# Patient Record
Sex: FEMALE | Race: WHITE | NOT HISPANIC OR LATINO | Employment: OTHER | URBAN - METROPOLITAN AREA
[De-identification: names, ages, dates, MRNs, and addresses within clinical notes are randomized per-mention and may not be internally consistent; named-entity substitution may affect disease eponyms.]

---

## 2017-07-17 ENCOUNTER — OFFICE VISIT (OUTPATIENT)
Dept: CARDIOLOGY | Facility: MEDICAL CENTER | Age: 72
End: 2017-07-17
Payer: MEDICARE

## 2017-07-17 VITALS
BODY MASS INDEX: 24.33 KG/M2 | SYSTOLIC BLOOD PRESSURE: 140 MMHG | DIASTOLIC BLOOD PRESSURE: 80 MMHG | RESPIRATION RATE: 14 BRPM | OXYGEN SATURATION: 96 % | HEART RATE: 68 BPM | HEIGHT: 67 IN | WEIGHT: 155 LBS

## 2017-07-17 DIAGNOSIS — I48.0 PAF (PAROXYSMAL ATRIAL FIBRILLATION) (HCC): ICD-10-CM

## 2017-07-17 DIAGNOSIS — I10 ESSENTIAL HYPERTENSION: ICD-10-CM

## 2017-07-17 DIAGNOSIS — I10 BENIGN ESSENTIAL HTN: Chronic | ICD-10-CM

## 2017-07-17 DIAGNOSIS — E78.2 MIXED HYPERLIPIDEMIA: Chronic | ICD-10-CM

## 2017-07-17 DIAGNOSIS — Z86.79 S/P ABLATION OF ATRIAL FIBRILLATION: ICD-10-CM

## 2017-07-17 DIAGNOSIS — Z98.890 S/P ABLATION OF ATRIAL FIBRILLATION: ICD-10-CM

## 2017-07-17 LAB — EKG IMPRESSION: NORMAL

## 2017-07-17 PROCEDURE — 93000 ELECTROCARDIOGRAM COMPLETE: CPT | Performed by: INTERNAL MEDICINE

## 2017-07-17 PROCEDURE — 99214 OFFICE O/P EST MOD 30 MIN: CPT | Performed by: INTERNAL MEDICINE

## 2017-07-17 RX ORDER — ASPIRIN 325 MG
325 TABLET ORAL DAILY
COMMUNITY
End: 2019-01-23

## 2017-07-17 RX ORDER — PRAVASTATIN SODIUM 20 MG
20 TABLET ORAL DAILY
Qty: 90 TAB | Refills: 11 | Status: SHIPPED | OUTPATIENT
Start: 2017-07-17 | End: 2018-01-04

## 2017-07-17 RX ORDER — LORATADINE 10 MG/1
10 TABLET ORAL PRN
COMMUNITY

## 2017-07-17 RX ORDER — FLUTICASONE PROPIONATE 50 MCG
1 SPRAY, SUSPENSION (ML) NASAL PRN
COMMUNITY
End: 2019-01-23

## 2017-07-17 RX ORDER — LISINOPRIL 20 MG/1
20 TABLET ORAL DAILY
Qty: 90 TAB | Refills: 3 | Status: SHIPPED | OUTPATIENT
Start: 2017-07-17 | End: 2018-09-15 | Stop reason: SDUPTHER

## 2017-07-17 NOTE — PROGRESS NOTES
Subjective:   Chacha Fraire is a 71 y.o. female who presents today with PAF with two RFA's in 2004. No palps. No symptomatic a fib. Has HLD. Unable to tolerate crestor at 5 mg. No new complaints except mild weight gain.    Past Medical History   Diagnosis Date   • Personal history of prior ablation treatment 5/29/2012   • Benign essential HTN 5/29/2012   • PAF (paroxysmal atrial fibrillation) (CMS-HCC) 5/29/2012   • Hyperlipidemia 5/29/2012     History reviewed. No pertinent past surgical history.  History reviewed. No pertinent family history.  History   Smoking status   • Never Smoker    Smokeless tobacco   • Never Used     Allergies   Allergen Reactions   • Crestor [Rosuvastatin Calcium]      Outpatient Encounter Prescriptions as of 7/17/2017   Medication Sig Dispense Refill   • aspirin (ASA) 325 MG Tab Take 325 mg by mouth every day.     • B Complex-Folic Acid (B COMPLEX PLUS PO) Take 1 Tab by mouth every day.     • loratadine (CLARITIN) 10 MG Tab Take 10 mg by mouth as needed.     • fluticasone (FLONASE) 50 MCG/ACT nasal spray Spray 1 Spray in nose as needed.     • lisinopril (PRINIVIL) 20 MG Tab Take 1 Tab by mouth every day. 90 Tab 3   • pravastatin (PRAVACHOL) 20 MG Tab Take 1 Tab by mouth every day. 90 Tab 11   • Ascorbic Acid (VITAMIN C) 1000 MG TABS Take 1,000 mg by mouth every day.     • vitamin D (CHOLECALCIFEROL) 1000 UNIT TABS Take 1,000 Units by mouth as needed.     • famotidine (PEPCID) 20 MG TABS Take 20 mg by mouth 1 time daily as needed.     • [DISCONTINUED] lisinopril (PRINIVIL) 20 MG Tab TAKE 1 TABLET BY MOUTH ONCE DAILY 90 Tab 2   • rosuvastatin (CRESTOR) 10 MG Tab Take 1 Tab by mouth every evening. 30 Tab 11   • cetirizine (ZYRTEC) 10 MG TABS Take 10 mg by mouth every day.     • Multiple Vitamins-Minerals (MULTIVITAMIN PO) Take 1 Tab by mouth every day.     • aspirin EC (ECOTRIN) 81 MG TBEC Take 81 mg by mouth every day.     • hydrochlorothiazide (HYDRODIURIL) 12.5 MG tablet Take 12.5 mg by  "mouth. 1 tablet daily prn for high BP       No facility-administered encounter medications on file as of 7/17/2017.     ROS     Objective:   /80 mmHg  Pulse 68  Resp 14  Ht 1.702 m (5' 7.01\")  Wt 70.308 kg (155 lb)  BMI 24.27 kg/m2  SpO2 96%    Physical Exam   Constitutional: She is oriented to person, place, and time. She appears well-developed and well-nourished. No distress.   HENT:   Mouth/Throat: Oropharynx is clear and moist.   Eyes: Conjunctivae and EOM are normal.   Neck: Neck supple. No JVD present. No thyroid mass present.   Cardiovascular: Normal rate, regular rhythm, S1 normal, S2 normal and normal pulses.  PMI is not displaced.  Exam reveals no gallop.    No murmur heard.  Pulses:       Carotid pulses are 2+ on the right side, and 2+ on the left side.       Radial pulses are 2+ on the right side, and 2+ on the left side.        Femoral pulses are 2+ on the right side, and 2+ on the left side.       Dorsalis pedis pulses are 2+ on the right side, and 2+ on the left side.   No peripheral edema.   Pulmonary/Chest: Effort normal and breath sounds normal.   Abdominal: Soft. Normal appearance. She exhibits no abdominal bruit and no mass. There is no hepatosplenomegaly. There is no tenderness.   Musculoskeletal: Normal range of motion. She exhibits no edema.        Lumbar back: She exhibits no tenderness and no spasm.   Neurological: She is alert and oriented to person, place, and time. She has normal strength.   Skin: Skin is warm and dry. No rash noted. No cyanosis. Nails show no clubbing.   Psychiatric: She has a normal mood and affect.       Assessment:     1. PAF (paroxysmal atrial fibrillation) (CMS-HCC)  EKG    pravastatin (PRAVACHOL) 20 MG Tab    LIPIDS, TOTAL, SERUM    COMP METABOLIC PANEL   2. S/P ablation of atrial fibrillation     3. Essential hypertension  lisinopril (PRINIVIL) 20 MG Tab   4. Mixed hyperlipidemia     5. Benign essential HTN         Medical Decision Making:  Today's " Assessment / Status / Plan:     1. PAF no clinical recurrence.  2. Htn continue ACE.  3. HLD try pravachol. If unable to tolerate try zetia. She is working on exercise and weight loss.  4. F/U in 1 year.

## 2017-07-17 NOTE — MR AVS SNAPSHOT
"        Chacha Fraire   2017 11:00 AM   Office Visit   MRN: [id number]    Department:  [facility]   Dept Phone:  [phone]    Description:  Female : 1945   Provider:  Angel Burgos M.D.           Reason for Visit     Follow-Up           Allergies as of 2017     Allergen Noted Reactions    Crestor [Rosuvastatin Calcium] 2017         You were diagnosed with     PAF (paroxysmal atrial fibrillation) (CMS-HCC)   [953942]       S/P ablation of atrial fibrillation   [192251]       Essential hypertension   [8040057]       Mixed hyperlipidemia   [272.2.ICD-9-CM]       Benign essential HTN   [093099]         Vital Signs     Blood Pressure Pulse Respirations Height Weight Body Mass Index    140/80 mmHg 68 14 1.702 m (5' 7.01\") 70.308 kg (155 lb) 24.27 kg/m2    Oxygen Saturation Smoking Status                96% Never Smoker           Basic Information     Date Of Birth Sex Race Ethnicity Preferred Language    1945 Female White Non- English      Problem List              ICD-10-CM Priority Class Noted - Resolved    Benign essential HTN (Chronic) I10   2012 - Present    PAF (paroxysmal atrial fibrillation) (CMS-[facility]) (Chronic) I48.0   2012 - Present    Hyperlipidemia (Chronic) E78.5   2012 - Present    S/P ablation of atrial fibrillation Z98.890, Z86.79   6/15/2012 - Present    DJD (degenerative joint disease) M19.90   2015 - Present      Health Maintenance        Date Due Completion Dates    IMM DTaP/Tdap/Td Vaccine (1 - Tdap) 1964 ---    PAP SMEAR 1966 ---    MAMMOGRAM 1985 ---    COLONOSCOPY 1995 ---    IMM ZOSTER VACCINE 2005 ---    BONE DENSITY 2010 ---    IMM PNEUMOCOCCAL 65+ (ADULT) LOW/MEDIUM RISK SERIES (1 of 2 - PCV13) 2010 ---    IMM INFLUENZA (1) 2017 ---            Results       Current Immunizations     No immunizations on file.      Below and/or attached are the medications your provider expects you to take. Review " all of your home medications and newly ordered medications with your provider and/or pharmacist. Follow medication instructions as directed by your provider and/or pharmacist. Please keep your medication list with you and share with your provider. Update the information when medications are discontinued, doses are changed, or new medications (including over-the-counter products) are added; and carry medication information at all times in the event of emergency situations     Allergies:  CRESTOR - (reactions not documented)               Medications  Valid as of: July 17, 2017 - 11:29 AM    Generic Name Brand Name Tablet Size Instructions for use    Ascorbic Acid (Tab) Vitamin C 1000 MG Take 1,000 mg by mouth every day.        Aspirin (Tablet Delayed Response) ECOTRIN 81 MG Take 81 mg by mouth every day.        Aspirin (Tab)  MG Take 325 mg by mouth every day.        B Complex-Folic Acid   Take 1 Tab by mouth every day.        Cetirizine HCl (Tab) ZYRTEC 10 MG Take 10 mg by mouth every day.        Cholecalciferol (Tab) cholecalciferol 1000 UNIT Take 1,000 Units by mouth as needed.        Famotidine (Tab) PEPCID 20 MG Take 20 mg by mouth 1 time daily as needed.        Fluticasone Propionate (Suspension) FLONASE 50 MCG/ACT Spray 1 Spray in nose as needed.        HydroCHLOROthiazide (Tab) HYDRODIURIL 12.5 MG Take 12.5 mg by mouth. 1 tablet daily prn for high BP        Lisinopril (Tab) PRINIVIL 20 MG Take 1 Tab by mouth every day.        Loratadine (Tab) CLARITIN 10 MG Take 10 mg by mouth as needed.        Multiple Vitamins-Minerals   Take 1 Tab by mouth every day.        Pravastatin Sodium (Tab) PRAVACHOL 20 MG Take 1 Tab by mouth every day.        Rosuvastatin Calcium (Tab) CRESTOR 10 MG Take 1 Tab by mouth every evening.        .                 Medicines prescribed today were sent to:     [facility] - [place], [place] - [address]    [address] [facility] [address]    Phone:  [phone] Fax: [phone]    Open 24 Hours?: No      Medication refill instructions:       If your prescription bottle indicates you have medication refills left, it is not necessary to call your provider’s office. Please contact your pharmacy and they will refill your medication.    If your prescription bottle indicates you do not have any refills left, you may request refills at any time through one of the following ways: The online [employer] system (except Urgent Care), by calling your provider’s office, or by asking your pharmacy to contact your provider’s office with a refill request. Medication refills are processed only during regular business hours and may not be available until the next business day. Your provider may request additional information or to have a follow-up visit with you prior to refilling your medication.   *Please Note: Medication refills are assigned a new Rx number when refilled electronically. Your pharmacy may indicate that no refills were authorized even though a new prescription for the same medication is available at the pharmacy. Please request the medicine by name with the pharmacy before contacting your provider for a refill.        Your To Do List     Future Labs/Procedures Complete By Expires    COMP METABOLIC PANEL  As directed 7/17/2018         [employer] Access Code: Activation code not generated  Current [employer] Status: Active

## 2017-07-17 NOTE — Clinical Note
[facility]   [address], [address]  [place] [address]  Phone: [phone]  Fax: [phone]              Chacha Fraire  1945    Encounter Date: 7/17/2017    Angel Burgos M.D.          PROGRESS NOTE:  Subjective:   Chacha Fraire is a 71 y.o. female who presents today with PAF with two RFA's in 2004. No palps. No symptomatic a fib. Has HLD. Unable to tolerate crestor at 5 mg. No new complaints except mild weight gain.    Past Medical History   Diagnosis Date   • Personal history of prior ablation treatment 5/29/2012   • Benign essential HTN 5/29/2012   • PAF (paroxysmal atrial fibrillation) (CMS-HCC) 5/29/2012   • Hyperlipidemia 5/29/2012     History reviewed. No pertinent past surgical history.  History reviewed. No pertinent family history.  History   Smoking status   • Never Smoker    Smokeless tobacco   • Never Used     Allergies   Allergen Reactions   • Crestor [Rosuvastatin Calcium]      Outpatient Encounter Prescriptions as of 7/17/2017   Medication Sig Dispense Refill   • aspirin (ASA) 325 MG Tab Take 325 mg by mouth every day.     • B Complex-Folic Acid (B COMPLEX PLUS PO) Take 1 Tab by mouth every day.     • loratadine (CLARITIN) 10 MG Tab Take 10 mg by mouth as needed.     • fluticasone (FLONASE) 50 MCG/ACT nasal spray Spray 1 Spray in nose as needed.     • lisinopril (PRINIVIL) 20 MG Tab Take 1 Tab by mouth every day. 90 Tab 3   • pravastatin (PRAVACHOL) 20 MG Tab Take 1 Tab by mouth every day. 90 Tab 11   • Ascorbic Acid (VITAMIN C) 1000 MG TABS Take 1,000 mg by mouth every day.     • vitamin D (CHOLECALCIFEROL) 1000 UNIT TABS Take 1,000 Units by mouth as needed.     • famotidine (PEPCID) 20 MG TABS Take 20 mg by mouth 1 time daily as needed.     • [DISCONTINUED] lisinopril (PRINIVIL) 20 MG Tab TAKE 1 TABLET BY MOUTH ONCE DAILY 90 Tab 2   • rosuvastatin (CRESTOR) 10 MG Tab Take 1 Tab by mouth every evening. 30 Tab 11   • cetirizine (ZYRTEC) 10 MG  "TABS Take 10 mg by mouth every day.     • Multiple Vitamins-Minerals (MULTIVITAMIN PO) Take 1 Tab by mouth every day.     • aspirin EC (ECOTRIN) 81 MG TBEC Take 81 mg by mouth every day.     • hydrochlorothiazide (HYDRODIURIL) 12.5 MG tablet Take 12.5 mg by mouth. 1 tablet daily prn for high BP       No facility-administered encounter medications on file as of 7/17/2017.     ROS     Objective:   /80 mmHg  Pulse 68  Resp 14  Ht 1.702 m (5' 7.01\")  Wt 70.308 kg (155 lb)  BMI 24.27 kg/m2  SpO2 96%    Physical Exam   Constitutional: She is oriented to person, place, and time. She appears well-developed and well-nourished. No distress.   HENT:   Mouth/Throat: Oropharynx is clear and moist.   Eyes: Conjunctivae and EOM are normal.   Neck: Neck supple. No JVD present. No thyroid mass present.   Cardiovascular: Normal rate, regular rhythm, S1 normal, S2 normal and normal pulses.  PMI is not displaced.  Exam reveals no gallop.    No murmur heard.  Pulses:       Carotid pulses are 2+ on the right side, and 2+ on the left side.       Radial pulses are 2+ on the right side, and 2+ on the left side.        Femoral pulses are 2+ on the right side, and 2+ on the left side.       Dorsalis pedis pulses are 2+ on the right side, and 2+ on the left side.   No peripheral edema.   Pulmonary/Chest: Effort normal and breath sounds normal.   Abdominal: Soft. Normal appearance. She exhibits no abdominal bruit and no mass. There is no hepatosplenomegaly. There is no tenderness.   Musculoskeletal: Normal range of motion. She exhibits no edema.        Lumbar back: She exhibits no tenderness and no spasm.   Neurological: She is alert and oriented to person, place, and time. She has normal strength.   Skin: Skin is warm and dry. No rash noted. No cyanosis. Nails show no clubbing.   Psychiatric: She has a normal mood and affect.       Assessment:     1. PAF (paroxysmal atrial fibrillation) (CMS-[facility])  EKG    pravastatin (PRAVACHOL) 20 " MG Tab    LIPIDS, TOTAL, SERUM    COMP METABOLIC PANEL   2. S/P ablation of atrial fibrillation     3. Essential hypertension  lisinopril (PRINIVIL) 20 MG Tab   4. Mixed hyperlipidemia     5. Benign essential HTN         Medical Decision Making:  Today's Assessment / Status / Plan:     1. PAF no clinical recurrence.  2. Htn continue ACE.  3. HLD try pravachol. If unable to tolerate try zetia. She is working on exercise and weight loss.  4. F/U in 1 year.        Zelalem Vo M.D.  [address] [address]  VIA Facsimile: [phone]

## 2017-12-23 LAB
ALBUMIN SERPL-MCNC: 4.2 G/DL (ref 3.5–4.8)
ALBUMIN/GLOB SERPL: 2 {RATIO} (ref 1.2–2.2)
ALP SERPL-CCNC: 83 IU/L (ref 39–117)
ALT SERPL-CCNC: 21 IU/L (ref 0–32)
AST SERPL-CCNC: 25 IU/L (ref 0–40)
BILIRUB SERPL-MCNC: 0.4 MG/DL (ref 0–1.2)
BUN SERPL-MCNC: 28 MG/DL (ref 8–27)
BUN/CREAT SERPL: 25 (ref 12–28)
CALCIUM SERPL-MCNC: 9.2 MG/DL (ref 8.7–10.3)
CHLORIDE SERPL-SCNC: 99 MMOL/L (ref 96–106)
CHOLEST SERPL-MCNC: 238 MG/DL (ref 100–199)
CO2 SERPL-SCNC: 25 MMOL/L (ref 18–29)
COMMENT 011824: ABNORMAL
CREAT SERPL-MCNC: 1.11 MG/DL (ref 0.57–1)
GLOBULIN SER CALC-MCNC: 2.1 G/DL (ref 1.5–4.5)
GLUCOSE SERPL-MCNC: 85 MG/DL (ref 65–99)
HDLC SERPL-MCNC: 64 MG/DL
IF AFRICAN AMERICAN  100797: 57 ML/MIN/1.73
IF NON AFRICAN AMER 100791: 50 ML/MIN/1.73
LDLC SERPL CALC-MCNC: 155 MG/DL (ref 0–99)
POTASSIUM SERPL-SCNC: 4.5 MMOL/L (ref 3.5–5.2)
PROT SERPL-MCNC: 6.3 G/DL (ref 6–8.5)
SODIUM SERPL-SCNC: 139 MMOL/L (ref 134–144)
TRIGL SERPL-MCNC: 95 MG/DL (ref 0–149)
VLDLC SERPL CALC-MCNC: 19 MG/DL (ref 5–40)

## 2017-12-26 ENCOUNTER — TELEPHONE (OUTPATIENT)
Dept: CARDIOLOGY | Facility: MEDICAL CENTER | Age: 72
End: 2017-12-26

## 2017-12-26 DIAGNOSIS — N17.9 ACUTE KIDNEY INJURY (HCC): ICD-10-CM

## 2017-12-26 NOTE — TELEPHONE ENCOUNTER
----- Message -----  From: Hiowt Fernandes M.D.  Sent: 12/26/2017  12:11 PM  To: Yuli Milton R.N.    No worries at all - GFr is unchanged, kidneys still doing fine - repeat NONfasting in 3-4w (just bmp) - drink a glass of H20 - bet it will normalize    =======================================================================    Attempted to call pt, no answer, left vm to call back    BMP ordered, lab slip mailed to pt

## 2017-12-26 NOTE — TELEPHONE ENCOUNTER
Karyn Yoo R.N.   Phone Number: [phone]             LA/jacqueline     Pt returning your call, please try again at .        ===============================================    Called pt, discussed lab results per Dr Fernandes, pt verbalizes understanding.    Pt is concern about her cholesterol level as it was more elevated now compared to before and she has been taking her Pravastatin as prescribed since July.    To Dr Aubrey IBRAHIM to Yaneth GARCIA

## 2018-01-03 DIAGNOSIS — E78.2 MIXED HYPERLIPIDEMIA: ICD-10-CM

## 2018-01-03 NOTE — TELEPHONE ENCOUNTER
Pt emailed answer:Per Dr Burgos: Would increase pravachol to 40 mg and recheck in three months. I will mail you a lab slip today. Let us know where you would like your prescription sent to. Mail order or local. You can double your current pills for now.     Mailed lab slip.

## 2018-01-04 DIAGNOSIS — E78.49 OTHER HYPERLIPIDEMIA: ICD-10-CM

## 2018-01-04 RX ORDER — PRAVASTATIN SODIUM 40 MG
40 TABLET ORAL DAILY
Qty: 90 TAB | Refills: 1 | Status: SHIPPED | OUTPATIENT
Start: 2018-01-04 | End: 2019-01-23

## 2018-09-15 DIAGNOSIS — I10 ESSENTIAL HYPERTENSION: ICD-10-CM

## 2018-09-17 RX ORDER — LISINOPRIL 20 MG/1
20 TABLET ORAL DAILY
Qty: 90 TAB | Refills: 0 | Status: SHIPPED | OUTPATIENT
Start: 2018-09-17 | End: 2019-01-23 | Stop reason: SDUPTHER

## 2019-01-23 ENCOUNTER — OFFICE VISIT (OUTPATIENT)
Dept: CARDIOLOGY | Facility: MEDICAL CENTER | Age: 74
End: 2019-01-23
Payer: MEDICARE

## 2019-01-23 VITALS
HEART RATE: 80 BPM | HEIGHT: 67 IN | DIASTOLIC BLOOD PRESSURE: 76 MMHG | OXYGEN SATURATION: 96 % | SYSTOLIC BLOOD PRESSURE: 122 MMHG | WEIGHT: 159 LBS | BODY MASS INDEX: 24.96 KG/M2

## 2019-01-23 DIAGNOSIS — I10 ESSENTIAL HYPERTENSION: ICD-10-CM

## 2019-01-23 DIAGNOSIS — Z98.890 S/P ABLATION OF ATRIAL FIBRILLATION: ICD-10-CM

## 2019-01-23 DIAGNOSIS — E78.00 PURE HYPERCHOLESTEROLEMIA: ICD-10-CM

## 2019-01-23 DIAGNOSIS — Z86.79 S/P ABLATION OF ATRIAL FIBRILLATION: ICD-10-CM

## 2019-01-23 DIAGNOSIS — I10 BENIGN ESSENTIAL HTN: Chronic | ICD-10-CM

## 2019-01-23 DIAGNOSIS — I48.0 PAF (PAROXYSMAL ATRIAL FIBRILLATION) (HCC): ICD-10-CM

## 2019-01-23 LAB — EKG IMPRESSION: NORMAL

## 2019-01-23 PROCEDURE — 99214 OFFICE O/P EST MOD 30 MIN: CPT | Performed by: INTERNAL MEDICINE

## 2019-01-23 PROCEDURE — 93000 ELECTROCARDIOGRAM COMPLETE: CPT | Performed by: INTERNAL MEDICINE

## 2019-01-23 RX ORDER — LISINOPRIL 20 MG/1
20 TABLET ORAL DAILY
Qty: 90 TAB | Refills: 4 | Status: SHIPPED | OUTPATIENT
Start: 2019-01-23 | End: 2020-03-13 | Stop reason: SDUPTHER

## 2019-01-23 RX ORDER — PRAVASTATIN SODIUM 20 MG
20 TABLET ORAL DAILY
Qty: 90 TAB | Refills: 11 | Status: SHIPPED | OUTPATIENT
Start: 2019-01-23 | End: 2020-01-29

## 2019-01-23 NOTE — LETTER
[facility]   [address]  [place] [address]  Phone: [phone]  Fax: [phone]              Chacha Fraire  1945    Encounter Date: 1/23/2019    Angel Burgos M.D.          PROGRESS NOTE:  Chief Complaint   Patient presents with   • Atrial Fibrillation     F/V DX:PAF       Subjective:   Chacha Fraire is a 73 y.o. female who presents today with history of paroxysmal atrial fibrillation status post ablation.  No recurrence.  Hyperlipidemia tolerated Pravachol 20 but not 40 currently off of statins.  Denies any chest pain or shortness of breath denies any palpitations mild arthritis    Past Medical History:   Diagnosis Date   • Benign essential HTN 5/29/2012   • Hyperlipidemia 5/29/2012   • PAF (paroxysmal atrial fibrillation) (CMS-HCC) 5/29/2012   • Personal history of prior ablation treatment 5/29/2012     No past surgical history on file.  No family history on file.  Social History     Social History   • Marital status:      Spouse name: N/A   • Number of children: N/A   • Years of education: N/A     Occupational History   • Not on file.     Social History Main Topics   • Smoking status: Never Smoker   • Smokeless tobacco: Never Used   • Alcohol use Not on file   • Drug use: Unknown   • Sexual activity: Not on file     Other Topics Concern   • Not on file     Social History Narrative   • No narrative on file     Allergies   Allergen Reactions   • Crestor [Rosuvastatin Calcium]      Outpatient Encounter Prescriptions as of 1/23/2019   Medication Sig Dispense Refill   • pravastatin (PRAVACHOL) 20 MG Tab Take 1 Tab by mouth every day. 90 Tab 11   • lisinopril (PRINIVIL) 20 MG Tab Take 1 Tab by mouth every day. Needs to be seen for further refills. Thank you 90 Tab 4   • B Complex-Folic Acid (B COMPLEX PLUS PO) Take 1 Tab by mouth every day.     • loratadine (CLARITIN) 10 MG Tab Take 10 mg by mouth as needed.     • Ascorbic Acid (VITAMIN C)  "1000 MG TABS Take 1,000 mg by mouth every day.     • Multiple Vitamins-Minerals (MULTIVITAMIN PO) Take 1 Tab by mouth every day.     • vitamin D (CHOLECALCIFEROL) 1000 UNIT TABS Take 1,000 Units by mouth as needed.     • [DISCONTINUED] lisinopril (PRINIVIL) 20 MG Tab Take 1 Tab by mouth every day. Needs to be seen for further refills. Thank you 90 Tab 0   • [DISCONTINUED] pravastatin (PRAVACHOL) 40 MG tablet Take 1 Tab by mouth every day. (Patient not taking: Reported on 1/23/2019) 90 Tab 1   • [DISCONTINUED] aspirin (ASA) 325 MG Tab Take 325 mg by mouth every day.     • [DISCONTINUED] fluticasone (FLONASE) 50 MCG/ACT nasal spray Spray 1 Spray in nose as needed.     • [DISCONTINUED] rosuvastatin (CRESTOR) 10 MG Tab Take 1 Tab by mouth every evening. (Patient not taking: Reported on 1/23/2019) 30 Tab 11   • [DISCONTINUED] cetirizine (ZYRTEC) 10 MG TABS Take 10 mg by mouth every day.     • aspirin EC (ECOTRIN) 81 MG TBEC Take 81 mg by mouth every day.     • famotidine (PEPCID) 20 MG TABS Take 20 mg by mouth 1 time daily as needed.     • [DISCONTINUED] hydrochlorothiazide (HYDRODIURIL) 12.5 MG tablet Take 12.5 mg by mouth. 1 tablet daily prn for high BP       No facility-administered encounter medications on file as of 1/23/2019.      ROS     Objective:   /76 (BP Location: Left arm, Patient Position: Sitting, BP Cuff Size: Adult)   Pulse 80   Ht 1.702 m (5' 7\")   Wt 72.1 kg (159 lb)   SpO2 96%   BMI 24.90 kg/m²      Physical Exam   Constitutional: She is oriented to person, place, and time. She appears well-developed and well-nourished.   HENT:   Head: Normocephalic and atraumatic.   Eyes: Pupils are equal, round, and reactive to light. EOM are normal.   Neck: Normal range of motion. Neck supple.   Cardiovascular: Normal rate, regular rhythm, normal heart sounds and intact distal pulses.  Exam reveals no gallop and no friction rub.    No murmur heard.  Pulmonary/Chest: Effort normal and breath sounds normal. "   Abdominal: Soft. Bowel sounds are normal.   Musculoskeletal: Normal range of motion. She exhibits no edema.   Neurological: She is alert and oriented to person, place, and time.   Skin: Skin is warm.   Psychiatric: She has a normal mood and affect. Her behavior is normal. Judgment and thought content normal.       Assessment:     1. PAF (paroxysmal atrial fibrillation) ([facility])  EKG   2. S/P ablation of atrial fibrillation     3. Benign essential HTN     4. Essential hypertension  lisinopril (PRINIVIL) 20 MG Tab   5. Pure hypercholesterolemia  COMP METABOLIC PANEL    Lipid Profile       Medical Decision Making:  Today's Assessment / Status / Plan:   1.  Hyperlipidemia restart Pravachol 20 mg a day.  Recheck lipids in 3 months.  2.  Hypertension refill lisinopril.  3.  Paroxysmal atrial for ablation status post ablation no recurrence.  Continue aspirin.  4.  Follow-up in 1 year.      Zelalem Vo M.D.  [address]  [facility] [address]  VIA Facsimile: [phone]

## 2019-01-23 NOTE — PROGRESS NOTES
Chief Complaint   Patient presents with   • Atrial Fibrillation     F/V DX:PAF       Subjective:   Chacha Fraire is a 73 y.o. female who presents today with history of paroxysmal atrial fibrillation status post ablation.  No recurrence.  Hyperlipidemia tolerated Pravachol 20 but not 40 currently off of statins.  Denies any chest pain or shortness of breath denies any palpitations mild arthritis    Past Medical History:   Diagnosis Date   • Benign essential HTN 5/29/2012   • Hyperlipidemia 5/29/2012   • PAF (paroxysmal atrial fibrillation) (CMS-HCC) 5/29/2012   • Personal history of prior ablation treatment 5/29/2012     No past surgical history on file.  No family history on file.  Social History     Social History   • Marital status:      Spouse name: N/A   • Number of children: N/A   • Years of education: N/A     Occupational History   • Not on file.     Social History Main Topics   • Smoking status: Never Smoker   • Smokeless tobacco: Never Used   • Alcohol use Not on file   • Drug use: Unknown   • Sexual activity: Not on file     Other Topics Concern   • Not on file     Social History Narrative   • No narrative on file     Allergies   Allergen Reactions   • Crestor [Rosuvastatin Calcium]      Outpatient Encounter Prescriptions as of 1/23/2019   Medication Sig Dispense Refill   • pravastatin (PRAVACHOL) 20 MG Tab Take 1 Tab by mouth every day. 90 Tab 11   • lisinopril (PRINIVIL) 20 MG Tab Take 1 Tab by mouth every day. Needs to be seen for further refills. Thank you 90 Tab 4   • B Complex-Folic Acid (B COMPLEX PLUS PO) Take 1 Tab by mouth every day.     • loratadine (CLARITIN) 10 MG Tab Take 10 mg by mouth as needed.     • Ascorbic Acid (VITAMIN C) 1000 MG TABS Take 1,000 mg by mouth every day.     • Multiple Vitamins-Minerals (MULTIVITAMIN PO) Take 1 Tab by mouth every day.     • vitamin D (CHOLECALCIFEROL) 1000 UNIT TABS Take 1,000 Units by mouth as needed.     • [DISCONTINUED] lisinopril (PRINIVIL) 20  "MG Tab Take 1 Tab by mouth every day. Needs to be seen for further refills. Thank you 90 Tab 0   • [DISCONTINUED] pravastatin (PRAVACHOL) 40 MG tablet Take 1 Tab by mouth every day. (Patient not taking: Reported on 1/23/2019) 90 Tab 1   • [DISCONTINUED] aspirin (ASA) 325 MG Tab Take 325 mg by mouth every day.     • [DISCONTINUED] fluticasone (FLONASE) 50 MCG/ACT nasal spray Spray 1 Spray in nose as needed.     • [DISCONTINUED] rosuvastatin (CRESTOR) 10 MG Tab Take 1 Tab by mouth every evening. (Patient not taking: Reported on 1/23/2019) 30 Tab 11   • [DISCONTINUED] cetirizine (ZYRTEC) 10 MG TABS Take 10 mg by mouth every day.     • aspirin EC (ECOTRIN) 81 MG TBEC Take 81 mg by mouth every day.     • famotidine (PEPCID) 20 MG TABS Take 20 mg by mouth 1 time daily as needed.     • [DISCONTINUED] hydrochlorothiazide (HYDRODIURIL) 12.5 MG tablet Take 12.5 mg by mouth. 1 tablet daily prn for high BP       No facility-administered encounter medications on file as of 1/23/2019.      ROS     Objective:   /76 (BP Location: Left arm, Patient Position: Sitting, BP Cuff Size: Adult)   Pulse 80   Ht 1.702 m (5' 7\")   Wt 72.1 kg (159 lb)   SpO2 96%   BMI 24.90 kg/m²     Physical Exam   Constitutional: She is oriented to person, place, and time. She appears well-developed and well-nourished.   HENT:   Head: Normocephalic and atraumatic.   Eyes: Pupils are equal, round, and reactive to light. EOM are normal.   Neck: Normal range of motion. Neck supple.   Cardiovascular: Normal rate, regular rhythm, normal heart sounds and intact distal pulses.  Exam reveals no gallop and no friction rub.    No murmur heard.  Pulmonary/Chest: Effort normal and breath sounds normal.   Abdominal: Soft. Bowel sounds are normal.   Musculoskeletal: Normal range of motion. She exhibits no edema.   Neurological: She is alert and oriented to person, place, and time.   Skin: Skin is warm.   Psychiatric: She has a normal mood and affect. Her " behavior is normal. Judgment and thought content normal.       Assessment:     1. PAF (paroxysmal atrial fibrillation) (HCC)  EKG   2. S/P ablation of atrial fibrillation     3. Benign essential HTN     4. Essential hypertension  lisinopril (PRINIVIL) 20 MG Tab   5. Pure hypercholesterolemia  COMP METABOLIC PANEL    Lipid Profile       Medical Decision Making:  Today's Assessment / Status / Plan:   1.  Hyperlipidemia restart Pravachol 20 mg a day.  Recheck lipids in 3 months.  2.  Hypertension refill lisinopril.  3.  Paroxysmal atrial for ablation status post ablation no recurrence.  Continue aspirin.  4.  Follow-up in 1 year.

## 2019-09-04 ENCOUNTER — TELEPHONE (OUTPATIENT)
Dept: CARDIOLOGY | Facility: MEDICAL CENTER | Age: 74
End: 2019-09-04

## 2019-09-04 NOTE — TELEPHONE ENCOUNTER
Received letter from pt along with copy of recent lab results from 7/23/19. Pt stated she has stopped taking her Pravachol because she thought it was causing joint pain. Pt realized it was her arthritis causing the joint pain and not the Pravachol.    Pt has a colonoscopy scheduled for 9/24/19 and stated she will restart her Pravachol after this procedure and repeat labs prior to her FV with our office.

## 2020-01-29 ENCOUNTER — OFFICE VISIT (OUTPATIENT)
Dept: CARDIOLOGY | Facility: MEDICAL CENTER | Age: 75
End: 2020-01-29
Payer: MEDICARE

## 2020-01-29 VITALS
DIASTOLIC BLOOD PRESSURE: 74 MMHG | BODY MASS INDEX: 23.54 KG/M2 | SYSTOLIC BLOOD PRESSURE: 122 MMHG | RESPIRATION RATE: 18 BRPM | HEIGHT: 67 IN | WEIGHT: 150 LBS | OXYGEN SATURATION: 98 % | HEART RATE: 74 BPM

## 2020-01-29 DIAGNOSIS — Z86.79 S/P ABLATION OF ATRIAL FIBRILLATION: ICD-10-CM

## 2020-01-29 DIAGNOSIS — I10 BENIGN ESSENTIAL HTN: Chronic | ICD-10-CM

## 2020-01-29 DIAGNOSIS — I48.0 PAF (PAROXYSMAL ATRIAL FIBRILLATION) (HCC): ICD-10-CM

## 2020-01-29 DIAGNOSIS — E78.2 MIXED HYPERLIPIDEMIA: Chronic | ICD-10-CM

## 2020-01-29 DIAGNOSIS — Z98.890 S/P ABLATION OF ATRIAL FIBRILLATION: ICD-10-CM

## 2020-01-29 LAB — EKG IMPRESSION: NORMAL

## 2020-01-29 PROCEDURE — 93000 ELECTROCARDIOGRAM COMPLETE: CPT | Performed by: INTERNAL MEDICINE

## 2020-01-29 PROCEDURE — 99214 OFFICE O/P EST MOD 30 MIN: CPT | Performed by: INTERNAL MEDICINE

## 2020-01-29 RX ORDER — ATORVASTATIN CALCIUM 20 MG/1
20 TABLET, FILM COATED ORAL DAILY
Qty: 90 TAB | Refills: 3 | Status: SHIPPED | OUTPATIENT
Start: 2020-01-29 | End: 2021-12-03

## 2020-01-29 RX ORDER — PRAVASTATIN SODIUM 40 MG
TABLET ORAL
COMMUNITY
Start: 2019-12-19 | End: 2020-01-29

## 2020-01-29 NOTE — PROGRESS NOTES
Chief Complaint   Patient presents with   • Atrial Fibrillation     F/V DX:PAF       Subjective:   Tim Fraire is a 74 y.o. female who presents today with previous A. fib ablation no recurrence. Hypertension controlled.  Hyperlipidemia with continued elevated LDL despite Pravachol usage.  Denies any palpitations, denies any syncope or presyncope.  Had C. difficile this past summer.  Negative colonoscopy.    Past Medical History:   Diagnosis Date   • Benign essential HTN 5/29/2012   • Hyperlipidemia 5/29/2012   • PAF (paroxysmal atrial fibrillation) (HCC) 5/29/2012   • Personal history of prior ablation treatment 5/29/2012     No past surgical history on file.  No family history on file.  Social History     Socioeconomic History   • Marital status:      Spouse name: Not on file   • Number of children: Not on file   • Years of education: Not on file   • Highest education level: Not on file   Occupational History   • Not on file   Social Needs   • Financial resource strain: Not on file   • Food insecurity:     Worry: Not on file     Inability: Not on file   • Transportation needs:     Medical: Not on file     Non-medical: Not on file   Tobacco Use   • Smoking status: Never Smoker   • Smokeless tobacco: Never Used   Substance and Sexual Activity   • Alcohol use: Not on file   • Drug use: Not on file   • Sexual activity: Not on file   Lifestyle   • Physical activity:     Days per week: Not on file     Minutes per session: Not on file   • Stress: Not on file   Relationships   • Social connections:     Talks on phone: Not on file     Gets together: Not on file     Attends Orthodoxy service: Not on file     Active member of club or organization: Not on file     Attends meetings of clubs or organizations: Not on file     Relationship status: Not on file   • Intimate partner violence:     Fear of current or ex partner: Not on file     Emotionally abused: Not on file     Physically abused: Not on file     Forced sexual  "activity: Not on file   Other Topics Concern   • Not on file   Social History Narrative   • Not on file     Allergies   Allergen Reactions   • Crestor [Rosuvastatin Calcium]      Outpatient Encounter Medications as of 1/29/2020   Medication Sig Dispense Refill   • Diclofenac Sodium 1 % Gel APPLY 1 GRAM TOPICALLY THREE TIMES DAILY AS NEEDED FOR PAIN     • Psyllium (METAMUCIL PO) Take  by mouth.     • atorvastatin (LIPITOR) 20 MG Tab Take 1 Tab by mouth every day. 90 Tab 3   • lisinopril (PRINIVIL) 20 MG Tab Take 1 Tab by mouth every day. Needs to be seen for further refills. Thank you 90 Tab 4   • B Complex-Folic Acid (B COMPLEX PLUS PO) Take 1 Tab by mouth every day.     • loratadine (CLARITIN) 10 MG Tab Take 10 mg by mouth as needed.     • Ascorbic Acid (VITAMIN C) 1000 MG TABS Take 1,000 mg by mouth every day.     • Multiple Vitamins-Minerals (MULTIVITAMIN PO) Take 1 Tab by mouth every day.     • vitamin D (CHOLECALCIFEROL) 1000 UNIT TABS Take 1,000 Units by mouth as needed.     • famotidine (PEPCID) 20 MG TABS Take 20 mg by mouth 1 time daily as needed.     • [DISCONTINUED] pravastatin (PRAVACHOL) 40 MG tablet TAKE 1 TABLET BY MOUTH ONCE DAILY FOR 90 DAYS     • [DISCONTINUED] pravastatin (PRAVACHOL) 20 MG Tab Take 1 Tab by mouth every day. 90 Tab 11   • [DISCONTINUED] aspirin EC (ECOTRIN) 81 MG TBEC Take 81 mg by mouth every day.       No facility-administered encounter medications on file as of 1/29/2020.      ROS     Objective:   /74 (BP Location: Left arm, Patient Position: Sitting, BP Cuff Size: Adult)   Pulse 74   Resp 18   Ht 1.702 m (5' 7\")   Wt 68 kg (150 lb)   SpO2 98%   BMI 23.49 kg/m²     Physical Exam   Constitutional: She is oriented to person, place, and time. She appears well-developed and well-nourished.   HENT:   Head: Normocephalic and atraumatic.   Eyes: Pupils are equal, round, and reactive to light. EOM are normal.   Neck: Normal range of motion. Neck supple.   Cardiovascular: " Normal rate, regular rhythm, normal heart sounds and intact distal pulses. Exam reveals no gallop and no friction rub.   No murmur heard.  Pulmonary/Chest: Effort normal and breath sounds normal.   Abdominal: Soft. Bowel sounds are normal.   Musculoskeletal: Normal range of motion.         General: No edema.   Neurological: She is alert and oriented to person, place, and time. No cranial nerve deficit.   Skin: Skin is warm.   Psychiatric: She has a normal mood and affect. Her behavior is normal. Judgment and thought content normal.       Assessment:     1. PAF (paroxysmal atrial fibrillation) ([facility])  EKG   2. Mixed hyperlipidemia  Lipid Profile    Comp Metabolic Panel   3. S/P ablation of atrial fibrillation     4. Benign essential HTN         Medical Decision Making:  Today's Assessment / Status / Plan:   1.  Paroxysmal atrial fibrillation no evidence of recurrence status post ablation.  2.  Hypertension continue current regimen good numbers.  3.  Hyperlipidemia despite Pravachol 40 mg try Lipitor 20.  Previously intolerant to Crestor.  Lipids and CMP in 3 months.  Possible referral to vascular medicine clinic if unable to tolerate.  4.  Follow-up with me in 1 year.

## 2020-03-13 DIAGNOSIS — I10 ESSENTIAL HYPERTENSION: ICD-10-CM

## 2020-03-13 RX ORDER — LISINOPRIL 20 MG/1
20 TABLET ORAL DAILY
Qty: 90 TAB | Refills: 3 | Status: SHIPPED | OUTPATIENT
Start: 2020-03-13 | End: 2021-02-03 | Stop reason: SDUPTHER

## 2020-08-15 LAB
ALBUMIN SERPL-MCNC: 4.3 G/DL (ref 3.7–4.7)
ALBUMIN/GLOB SERPL: 2.3 {RATIO} (ref 1.2–2.2)
ALP SERPL-CCNC: 86 IU/L (ref 39–117)
ALT SERPL-CCNC: 18 IU/L (ref 0–32)
AST SERPL-CCNC: 22 IU/L (ref 0–40)
BILIRUB SERPL-MCNC: 0.5 MG/DL (ref 0–1.2)
BUN SERPL-MCNC: 24 MG/DL (ref 8–27)
BUN/CREAT SERPL: 25 (ref 12–28)
CALCIUM SERPL-MCNC: 8.9 MG/DL (ref 8.7–10.3)
CHLORIDE SERPL-SCNC: 104 MMOL/L (ref 96–106)
CHOLEST SERPL-MCNC: 163 MG/DL (ref 100–199)
CO2 SERPL-SCNC: 20 MMOL/L (ref 20–29)
CREAT SERPL-MCNC: 0.95 MG/DL (ref 0.57–1)
GLOBULIN SER CALC-MCNC: 1.9 G/DL (ref 1.5–4.5)
GLUCOSE SERPL-MCNC: 90 MG/DL (ref 65–99)
HDLC SERPL-MCNC: 53 MG/DL
LABORATORY COMMENT REPORT: NORMAL
LDLC SERPL CALC-MCNC: 95 MG/DL (ref 0–99)
POTASSIUM SERPL-SCNC: 4.3 MMOL/L (ref 3.5–5.2)
PROT SERPL-MCNC: 6.2 G/DL (ref 6–8.5)
SODIUM SERPL-SCNC: 137 MMOL/L (ref 134–144)
TRIGL SERPL-MCNC: 77 MG/DL (ref 0–149)
VLDLC SERPL CALC-MCNC: 15 MG/DL (ref 5–40)

## 2021-02-03 ENCOUNTER — OFFICE VISIT (OUTPATIENT)
Dept: CARDIOLOGY | Facility: MEDICAL CENTER | Age: 76
End: 2021-02-03
Payer: MEDICARE

## 2021-02-03 VITALS
OXYGEN SATURATION: 96 % | HEIGHT: 66 IN | DIASTOLIC BLOOD PRESSURE: 76 MMHG | WEIGHT: 158 LBS | HEART RATE: 84 BPM | BODY MASS INDEX: 25.39 KG/M2 | SYSTOLIC BLOOD PRESSURE: 116 MMHG

## 2021-02-03 DIAGNOSIS — Z98.890 S/P ABLATION OF ATRIAL FIBRILLATION: ICD-10-CM

## 2021-02-03 DIAGNOSIS — E78.2 MIXED HYPERLIPIDEMIA: Chronic | ICD-10-CM

## 2021-02-03 DIAGNOSIS — I10 BENIGN ESSENTIAL HTN: Chronic | ICD-10-CM

## 2021-02-03 DIAGNOSIS — I48.0 PAF (PAROXYSMAL ATRIAL FIBRILLATION) (HCC): ICD-10-CM

## 2021-02-03 DIAGNOSIS — I10 ESSENTIAL HYPERTENSION: ICD-10-CM

## 2021-02-03 DIAGNOSIS — M35.3 PMR (POLYMYALGIA RHEUMATICA) (HCC): ICD-10-CM

## 2021-02-03 DIAGNOSIS — Z86.79 S/P ABLATION OF ATRIAL FIBRILLATION: ICD-10-CM

## 2021-02-03 LAB — EKG IMPRESSION: NORMAL

## 2021-02-03 PROCEDURE — 99214 OFFICE O/P EST MOD 30 MIN: CPT | Performed by: INTERNAL MEDICINE

## 2021-02-03 PROCEDURE — 93000 ELECTROCARDIOGRAM COMPLETE: CPT | Performed by: INTERNAL MEDICINE

## 2021-02-03 RX ORDER — SENNOSIDES 8.6 MG
650 CAPSULE ORAL EVERY 6 HOURS PRN
COMMUNITY

## 2021-02-03 RX ORDER — OMEPRAZOLE 20 MG/1
20 CAPSULE, DELAYED RELEASE ORAL DAILY
COMMUNITY

## 2021-02-03 RX ORDER — LISINOPRIL 20 MG/1
20 TABLET ORAL DAILY
Qty: 90 TAB | Refills: 3 | Status: SHIPPED | OUTPATIENT
Start: 2021-02-03 | End: 2021-10-06 | Stop reason: SDUPTHER

## 2021-02-03 RX ORDER — PREDNISONE 5 MG/1
5 TABLET ORAL DAILY
COMMUNITY
Start: 2020-11-30 | End: 2021-03-18

## 2021-02-03 NOTE — PROGRESS NOTES
Chief Complaint   Patient presents with   • Atrial Fibrillation     F/V DX:PAF       Subjective:   Tim Fraire is a 75 y.o. female who presents today with recent diagnosis of PMR on steroid taper.  Has been off the statins since the diagnosis.  Tapered off the steroids and proximal muscle weakness recurred.  Back on medications.  Hyperlipidemia.  No atrial arrhythmias.    Past Medical History:   Diagnosis Date   • Benign essential HTN 5/29/2012   • Hyperlipidemia 5/29/2012   • PAF (paroxysmal atrial fibrillation) (HCC) 5/29/2012   • Personal history of prior ablation treatment 5/29/2012     No past surgical history on file.  No family history on file.  Social History     Socioeconomic History   • Marital status:      Spouse name: Not on file   • Number of children: Not on file   • Years of education: Not on file   • Highest education level: Not on file   Occupational History   • Not on file   Social Needs   • Financial resource strain: Not on file   • Food insecurity     Worry: Not on file     Inability: Not on file   • Transportation needs     Medical: Not on file     Non-medical: Not on file   Tobacco Use   • Smoking status: Never Smoker   • Smokeless tobacco: Never Used   Substance and Sexual Activity   • Alcohol use: Not on file   • Drug use: Not on file   • Sexual activity: Not on file   Lifestyle   • Physical activity     Days per week: Not on file     Minutes per session: Not on file   • Stress: Not on file   Relationships   • Social connections     Talks on phone: Not on file     Gets together: Not on file     Attends Caodaism service: Not on file     Active member of club or organization: Not on file     Attends meetings of clubs or organizations: Not on file     Relationship status: Not on file   • Intimate partner violence     Fear of current or ex partner: Not on file     Emotionally abused: Not on file     Physically abused: Not on file     Forced sexual activity: Not on file   Other Topics  "Concern   • Not on file   Social History Narrative   • Not on file     Allergies   Allergen Reactions   • Crestor [Rosuvastatin Calcium]    • Rosuvastatin      Other reaction(s): .Unknown     Outpatient Encounter Medications as of 2/3/2021   Medication Sig Dispense Refill   • acetaminophen (TYLENOL 8 HOUR) 650 MG CR tablet Take 650 mg by mouth every 6 hours as needed.     • predniSONE (DELTASONE) 5 MG Tab Take 5 mg by mouth every day.     • omeprazole (PRILOSEC) 20 MG delayed-release capsule Take 20 mg by mouth every day.     • lisinopril (PRINIVIL) 20 MG Tab Take 1 Tab by mouth every day. 90 Tab 3   • Diclofenac Sodium 1 % Gel APPLY 1 GRAM TOPICALLY THREE TIMES DAILY AS NEEDED FOR PAIN     • Psyllium (METAMUCIL PO) Take  by mouth.     • atorvastatin (LIPITOR) 20 MG Tab Take 1 Tab by mouth every day. 90 Tab 3   • B Complex-Folic Acid (B COMPLEX PLUS PO) Take 1 Tab by mouth every day.     • loratadine (CLARITIN) 10 MG Tab Take 10 mg by mouth as needed.     • Ascorbic Acid (VITAMIN C) 1000 MG TABS Take 1,000 mg by mouth every day.     • Multiple Vitamins-Minerals (MULTIVITAMIN PO) Take 1 Tab by mouth every day.     • vitamin D (CHOLECALCIFEROL) 1000 UNIT TABS Take 1,000 Units by mouth as needed.     • famotidine (PEPCID) 20 MG TABS Take 20 mg by mouth 1 time daily as needed.     • [DISCONTINUED] lisinopril (PRINIVIL) 20 MG Tab Take 1 Tab by mouth every day. 90 Tab 3     No facility-administered encounter medications on file as of 2/3/2021.      ROS     Objective:   /76 (BP Location: Left arm, Patient Position: Sitting, BP Cuff Size: Adult)   Pulse 84   Ht 1.676 m (5' 6\")   Wt 71.7 kg (158 lb)   SpO2 96%   BMI 25.50 kg/m²     Physical Exam   Constitutional: She is oriented to person, place, and time. She appears well-developed and well-nourished.   HENT:   Head: Normocephalic and atraumatic.   Eyes: Pupils are equal, round, and reactive to light. EOM are normal.   Neck: Normal range of motion. Neck supple. "   Cardiovascular: Normal rate, regular rhythm, normal heart sounds and intact distal pulses. Exam reveals no gallop and no friction rub.   No murmur heard.  Pulmonary/Chest: Effort normal and breath sounds normal.   Abdominal: Soft. Bowel sounds are normal.   Musculoskeletal: Normal range of motion.         General: No edema.   Neurological: She is alert and oriented to person, place, and time. No cranial nerve deficit.   Skin: Skin is warm.   Psychiatric: She has a normal mood and affect. Her behavior is normal. Judgment and thought content normal.       Assessment:     1. PAF (paroxysmal atrial fibrillation) ([facility])  EKG   2. S/P ablation of atrial fibrillation     3. Mixed hyperlipidemia     4. Benign essential HTN     5. Essential hypertension  lisinopril (PRINIVIL) 20 MG Tab   6. PMR (polymyalgia rheumatica) ([facility])         Medical Decision Making:  Today's Assessment / Status / Plan:   1.  PMR continue on steroid taper.  2.  Hyperlipidemia slowly restart statin.  3.  Hypertension continue current regimen.  4.  Follow-up in 6 to 12 months.  5.  Atrial fibrillation none seen.

## 2021-09-23 ENCOUNTER — PATIENT MESSAGE (OUTPATIENT)
Dept: CARDIOLOGY | Facility: MEDICAL CENTER | Age: 76
End: 2021-09-23

## 2021-09-23 DIAGNOSIS — E78.2 MIXED HYPERLIPIDEMIA: ICD-10-CM

## 2021-10-01 ENCOUNTER — HOSPITAL ENCOUNTER (OUTPATIENT)
Dept: LAB | Facility: MEDICAL CENTER | Age: 76
End: 2021-10-01
Attending: INTERNAL MEDICINE
Payer: MEDICARE

## 2021-10-01 DIAGNOSIS — E78.2 MIXED HYPERLIPIDEMIA: ICD-10-CM

## 2021-10-01 LAB
CHOLEST SERPL-MCNC: 285 MG/DL (ref 100–199)
FASTING STATUS PATIENT QL REPORTED: NORMAL
HDLC SERPL-MCNC: 55 MG/DL
LDLC SERPL CALC-MCNC: 194 MG/DL
TRIGL SERPL-MCNC: 181 MG/DL (ref 0–149)

## 2021-10-01 PROCEDURE — 36415 COLL VENOUS BLD VENIPUNCTURE: CPT

## 2021-10-01 PROCEDURE — 80061 LIPID PANEL: CPT

## 2021-10-06 ENCOUNTER — TELEPHONE (OUTPATIENT)
Dept: CARDIOLOGY | Facility: MEDICAL CENTER | Age: 76
End: 2021-10-06

## 2021-10-06 ENCOUNTER — OFFICE VISIT (OUTPATIENT)
Dept: CARDIOLOGY | Facility: MEDICAL CENTER | Age: 76
End: 2021-10-06
Payer: MEDICARE

## 2021-10-06 VITALS
OXYGEN SATURATION: 96 % | HEART RATE: 72 BPM | WEIGHT: 158 LBS | SYSTOLIC BLOOD PRESSURE: 152 MMHG | HEIGHT: 67 IN | DIASTOLIC BLOOD PRESSURE: 80 MMHG | BODY MASS INDEX: 24.8 KG/M2

## 2021-10-06 DIAGNOSIS — Z98.890 S/P ABLATION OF ATRIAL FIBRILLATION: ICD-10-CM

## 2021-10-06 DIAGNOSIS — I10 BENIGN ESSENTIAL HTN: Chronic | ICD-10-CM

## 2021-10-06 DIAGNOSIS — I48.0 PAF (PAROXYSMAL ATRIAL FIBRILLATION) (HCC): ICD-10-CM

## 2021-10-06 DIAGNOSIS — Z86.79 S/P ABLATION OF ATRIAL FIBRILLATION: ICD-10-CM

## 2021-10-06 DIAGNOSIS — M35.3 PMR (POLYMYALGIA RHEUMATICA) (HCC): ICD-10-CM

## 2021-10-06 DIAGNOSIS — E78.2 MIXED HYPERLIPIDEMIA: Chronic | ICD-10-CM

## 2021-10-06 DIAGNOSIS — I10 ESSENTIAL HYPERTENSION: ICD-10-CM

## 2021-10-06 LAB — EKG IMPRESSION: NORMAL

## 2021-10-06 PROCEDURE — 93000 ELECTROCARDIOGRAM COMPLETE: CPT | Performed by: INTERNAL MEDICINE

## 2021-10-06 PROCEDURE — 99214 OFFICE O/P EST MOD 30 MIN: CPT | Performed by: INTERNAL MEDICINE

## 2021-10-06 RX ORDER — LISINOPRIL 20 MG/1
20 TABLET ORAL DAILY
Qty: 90 TABLET | Refills: 3 | Status: SHIPPED | OUTPATIENT
Start: 2021-10-06 | End: 2021-10-11

## 2021-10-06 RX ORDER — ATORVASTATIN CALCIUM 20 MG/1
20 TABLET, FILM COATED ORAL DAILY
Qty: 90 TABLET | Refills: 3 | Status: SHIPPED | OUTPATIENT
Start: 2021-10-06 | End: 2021-10-21

## 2021-10-06 NOTE — TELEPHONE ENCOUNTER
----- Message from Angel Burgos M.D. sent at 10/2/2021  7:47 AM PDT -----  Titus, needs to go back on statins and see vascular medicine

## 2021-10-06 NOTE — PROGRESS NOTES
Chief Complaint   Patient presents with   • Atrial Fibrillation     F/V Dx: PAF (paroxysmal atrial fibrillation) (HCC)       Subjective     Tim Fraire is a 75 y.o. female who presents today with history of PAF status post ablation.  May have had one episode of a fib lasting 10 hours although may have been skips.  Slightly irregular and slow.  States the first episode since her previous ablation.  Under some increased stress.  Has not been on statins.  PMR on prednisone.  On blood pressure medications.    Past Medical History:   Diagnosis Date   • Benign essential HTN 5/29/2012   • Hyperlipidemia 5/29/2012   • PAF (paroxysmal atrial fibrillation) (HCC) 5/29/2012   • Personal history of prior ablation treatment 5/29/2012     History reviewed. No pertinent surgical history.  History reviewed. No pertinent family history.  Social History     Socioeconomic History   • Marital status:      Spouse name: Not on file   • Number of children: Not on file   • Years of education: Not on file   • Highest education level: Not on file   Occupational History   • Not on file   Tobacco Use   • Smoking status: Never Smoker   • Smokeless tobacco: Never Used   Substance and Sexual Activity   • Alcohol use: Not on file   • Drug use: Not on file   • Sexual activity: Not on file   Other Topics Concern   • Not on file   Social History Narrative   • Not on file     Social Determinants of Health     Financial Resource Strain:    • Difficulty of Paying Living Expenses:    Food Insecurity:    • Worried About Running Out of Food in the Last Year:    • Ran Out of Food in the Last Year:    Transportation Needs:    • Lack of Transportation (Medical):    • Lack of Transportation (Non-Medical):    Physical Activity:    • Days of Exercise per Week:    • Minutes of Exercise per Session:    Stress:    • Feeling of Stress :    Social Connections:    • Frequency of Communication with Friends and Family:    • Frequency of Social Gatherings with  Friends and Family:    • Attends Evangelical Services:    • Active Member of Clubs or Organizations:    • Attends Club or Organization Meetings:    • Marital Status:    Intimate Partner Violence:    • Fear of Current or Ex-Partner:    • Emotionally Abused:    • Physically Abused:    • Sexually Abused:      Allergies   Allergen Reactions   • Crestor [Rosuvastatin Calcium]    • Rosuvastatin      Other reaction(s): .Unknown     Outpatient Encounter Medications as of 10/6/2021   Medication Sig Dispense Refill   • atorvastatin (LIPITOR) 20 MG Tab Take 1 Tablet by mouth every day. 90 Tablet 3   • lisinopril (PRINIVIL) 20 MG Tab Take 1 Tablet by mouth every day. 90 Tablet 3   • predniSONE (DELTASONE) 1 MG Tab Take 4mg (4 tablets) by mouth daily for 4 weeks, then decrease to 3mg (3 tablets) by mouth for 4 weeks, then decrease to 2mg (2 tablets) by mouth for 4 weeks, then 1mg (1 tablet) by mouth for 4 weeks. 120 tablet 2   • predniSONE (DELTASONE) 5 MG Tab Take with 1mg tab and taper as follows- 9mg daily for 2 weeks, then 8mg daily for 2 weeks, then 7mg daily for 2 weeks then 6mg daily for 2 weeks. Will then then decrease taper to go down by 1mg every 4 weeks 90 tablet 1   • aspirin (ASA) 325 MG Tab Take 81 mg by mouth every 6 hours as needed.     • ibuprofen (MOTRIN) 200 MG Tab Take 200 mg by mouth every 6 hours as needed.     • predniSONE (DELTASONE) 1 MG Tab Take with 5mg tab and taper as follows- 9mg daily for 2 weeks, then 8mg daily for 2 weeks, then 7mg daily for 2 weeks then 6mg daily for 2 weeks. Will then then decrease taper to go down by 1mg every 4 weeks 120 tablet 2   • acetaminophen (TYLENOL 8 HOUR) 650 MG CR tablet Take 650 mg by mouth every 6 hours as needed.     • omeprazole (PRILOSEC) 20 MG delayed-release capsule Take 20 mg by mouth every day.     • Diclofenac Sodium 1 % Gel APPLY 1 GRAM TOPICALLY THREE TIMES DAILY AS NEEDED FOR PAIN     • Psyllium (METAMUCIL PO) Take  by mouth.     • atorvastatin (LIPITOR)  "20 MG Tab Take 1 Tab by mouth every day. 90 Tab 3   • B Complex-Folic Acid (B COMPLEX PLUS PO) Take 1 Tab by mouth every day.     • loratadine (CLARITIN) 10 MG Tab Take 10 mg by mouth as needed.     • Ascorbic Acid (VITAMIN C) 1000 MG TABS Take 1,000 mg by mouth every day.     • Multiple Vitamins-Minerals (MULTIVITAMIN PO) Take 1 Tablet by mouth every day.     • vitamin D (CHOLECALCIFEROL) 1000 UNIT TABS Take 1,000 Units by mouth every day. D3     • famotidine (PEPCID) 20 MG TABS Take 20 mg by mouth 1 time daily as needed.     • [DISCONTINUED] lisinopril (PRINIVIL) 20 MG Tab Take 1 Tab by mouth every day. 90 Tab 3     No facility-administered encounter medications on file as of 10/6/2021.     ROS           Objective     /80 (BP Location: Left arm, Patient Position: Sitting, BP Cuff Size: Adult)   Pulse 72   Ht 1.702 m (5' 7\")   Wt 71.7 kg (158 lb)   SpO2 96%   BMI 24.75 kg/m²     Physical Exam  Constitutional:       Appearance: She is well-developed.   HENT:      Head: Normocephalic and atraumatic.   Eyes:      Pupils: Pupils are equal, round, and reactive to light.   Cardiovascular:      Rate and Rhythm: Normal rate and regular rhythm.      Heart sounds: Normal heart sounds. No murmur heard.   No friction rub. No gallop.    Pulmonary:      Effort: Pulmonary effort is normal.      Breath sounds: Normal breath sounds.   Abdominal:      General: Bowel sounds are normal.      Palpations: Abdomen is soft.   Musculoskeletal:         General: Normal range of motion.      Cervical back: Normal range of motion and neck supple.   Skin:     General: Skin is warm.   Neurological:      Mental Status: She is alert and oriented to person, place, and time.      Cranial Nerves: No cranial nerve deficit.   Psychiatric:         Behavior: Behavior normal.         Thought Content: Thought content normal.         Judgment: Judgment normal.                Assessment & Plan     1. PAF (paroxysmal atrial fibrillation) ([facility])  EKG "    [facility] ZIO PATCH MONITOR   2. S/P ablation of atrial fibrillation     3. PMR (polymyalgia rheumatica) (HCC)     4. Mixed hyperlipidemia  Comp Metabolic Panel    Lipid Profile   5. Benign essential HTN     6. Essential hypertension  lisinopril (PRINIVIL) 20 MG Tab       Medical Decision Making: Today's Assessment/Status/Plan:   1.  PAF.  Possible recurrence check a Zio.  Get a Kardia.  2.  Hyperlipidemia restart Lipitor.  3.  PMR on meds.  4.  Hypertension on meds.  High today may be whitecoat syndrome.  She will monitor at home.  5.  Follow-up in 4-6 months.

## 2021-10-10 ENCOUNTER — PATIENT MESSAGE (OUTPATIENT)
Dept: CARDIOLOGY | Facility: MEDICAL CENTER | Age: 76
End: 2021-10-10

## 2021-10-11 DIAGNOSIS — I10 ESSENTIAL HYPERTENSION: ICD-10-CM

## 2021-10-11 DIAGNOSIS — I10 BENIGN ESSENTIAL HTN: ICD-10-CM

## 2021-10-11 RX ORDER — LISINOPRIL 20 MG/1
40 TABLET ORAL DAILY
Qty: 180 TABLET | Refills: 3 | Status: SHIPPED | OUTPATIENT
Start: 2021-10-11 | End: 2021-12-03

## 2021-10-11 RX ORDER — TRIAMTERENE AND HYDROCHLOROTHIAZIDE 37.5; 25 MG/1; MG/1
1 TABLET ORAL DAILY
Qty: 30 TABLET | Refills: 3 | Status: SHIPPED | OUTPATIENT
Start: 2021-10-11 | End: 2021-12-03

## 2021-10-13 ENCOUNTER — TELEPHONE (OUTPATIENT)
Dept: CARDIOLOGY | Facility: MEDICAL CENTER | Age: 76
End: 2021-10-13

## 2021-10-13 ENCOUNTER — NON-PROVIDER VISIT (OUTPATIENT)
Dept: CARDIOLOGY | Facility: PHYSICIAN GROUP | Age: 76
End: 2021-10-13
Payer: MEDICARE

## 2021-10-13 ENCOUNTER — HOSPITAL ENCOUNTER (OUTPATIENT)
Dept: LAB | Facility: MEDICAL CENTER | Age: 76
End: 2021-10-13
Attending: NURSE PRACTITIONER
Payer: MEDICARE

## 2021-10-13 DIAGNOSIS — D72.9 NEUTROPHILIC LEUKOCYTOSIS: ICD-10-CM

## 2021-10-13 DIAGNOSIS — Z79.52 LONG TERM (CURRENT) USE OF SYSTEMIC STEROIDS: ICD-10-CM

## 2021-10-13 DIAGNOSIS — M35.3 POLYMYALGIA RHEUMATICA (HCC): ICD-10-CM

## 2021-10-13 DIAGNOSIS — I48.0 PAF (PAROXYSMAL ATRIAL FIBRILLATION) (HCC): ICD-10-CM

## 2021-10-13 LAB
ALBUMIN SERPL BCP-MCNC: 4.4 G/DL (ref 3.2–4.9)
ALBUMIN/GLOB SERPL: 1.8 G/DL
ALP SERPL-CCNC: 89 U/L (ref 30–99)
ALT SERPL-CCNC: 20 U/L (ref 2–50)
ANION GAP SERPL CALC-SCNC: 11 MMOL/L (ref 7–16)
APPEARANCE UR: CLEAR
AST SERPL-CCNC: 28 U/L (ref 12–45)
BACTERIA #/AREA URNS HPF: NEGATIVE /HPF
BASOPHILS # BLD AUTO: 0.9 % (ref 0–1.8)
BASOPHILS # BLD: 0.09 K/UL (ref 0–0.12)
BILIRUB SERPL-MCNC: 0.4 MG/DL (ref 0.1–1.5)
BILIRUB UR QL STRIP.AUTO: NEGATIVE
BUN SERPL-MCNC: 29 MG/DL (ref 8–22)
CALCIUM SERPL-MCNC: 9.4 MG/DL (ref 8.5–10.5)
CHLORIDE SERPL-SCNC: 103 MMOL/L (ref 96–112)
CO2 SERPL-SCNC: 27 MMOL/L (ref 20–33)
COLOR UR: YELLOW
CREAT SERPL-MCNC: 1.23 MG/DL (ref 0.5–1.4)
CRP SERPL HS-MCNC: <0.3 MG/DL (ref 0–0.75)
EOSINOPHIL # BLD AUTO: 0.25 K/UL (ref 0–0.51)
EOSINOPHIL NFR BLD: 2.4 % (ref 0–6.9)
EPI CELLS #/AREA URNS HPF: NEGATIVE /HPF
ERYTHROCYTE [DISTWIDTH] IN BLOOD BY AUTOMATED COUNT: 45.5 FL (ref 35.9–50)
ERYTHROCYTE [SEDIMENTATION RATE] IN BLOOD BY WESTERGREN METHOD: 14 MM/HOUR (ref 0–25)
GLOBULIN SER CALC-MCNC: 2.5 G/DL (ref 1.9–3.5)
GLUCOSE SERPL-MCNC: 88 MG/DL (ref 65–99)
GLUCOSE UR STRIP.AUTO-MCNC: NEGATIVE MG/DL
HCT VFR BLD AUTO: 45.5 % (ref 37–47)
HGB BLD-MCNC: 14.2 G/DL (ref 12–16)
HYALINE CASTS #/AREA URNS LPF: NORMAL /LPF
IMM GRANULOCYTES # BLD AUTO: 0.04 K/UL (ref 0–0.11)
IMM GRANULOCYTES NFR BLD AUTO: 0.4 % (ref 0–0.9)
KETONES UR STRIP.AUTO-MCNC: NEGATIVE MG/DL
LEUKOCYTE ESTERASE UR QL STRIP.AUTO: NEGATIVE
LYMPHOCYTES # BLD AUTO: 1.69 K/UL (ref 1–4.8)
LYMPHOCYTES NFR BLD: 16 % (ref 22–41)
MCH RBC QN AUTO: 29 PG (ref 27–33)
MCHC RBC AUTO-ENTMCNC: 31.2 G/DL (ref 33.6–35)
MCV RBC AUTO: 93 FL (ref 81.4–97.8)
MICRO URNS: ABNORMAL
MONOCYTES # BLD AUTO: 0.78 K/UL (ref 0–0.85)
MONOCYTES NFR BLD AUTO: 7.4 % (ref 0–13.4)
NEUTROPHILS # BLD AUTO: 7.72 K/UL (ref 2–7.15)
NEUTROPHILS NFR BLD: 72.9 % (ref 44–72)
NITRITE UR QL STRIP.AUTO: NEGATIVE
NRBC # BLD AUTO: 0 K/UL
NRBC BLD-RTO: 0 /100 WBC
PH UR STRIP.AUTO: 6 [PH] (ref 5–8)
PLATELET # BLD AUTO: 317 K/UL (ref 164–446)
PMV BLD AUTO: 10.8 FL (ref 9–12.9)
POTASSIUM SERPL-SCNC: 4.3 MMOL/L (ref 3.6–5.5)
PROT SERPL-MCNC: 6.9 G/DL (ref 6–8.2)
PROT UR QL STRIP: NEGATIVE MG/DL
RBC # BLD AUTO: 4.89 M/UL (ref 4.2–5.4)
RBC # URNS HPF: NORMAL /HPF
RBC UR QL AUTO: ABNORMAL
SODIUM SERPL-SCNC: 141 MMOL/L (ref 135–145)
SP GR UR STRIP.AUTO: 1.02
UROBILINOGEN UR STRIP.AUTO-MCNC: 0.2 MG/DL
WBC # BLD AUTO: 10.6 K/UL (ref 4.8–10.8)
WBC #/AREA URNS HPF: NORMAL /HPF

## 2021-10-13 PROCEDURE — 81001 URINALYSIS AUTO W/SCOPE: CPT

## 2021-10-13 PROCEDURE — 36415 COLL VENOUS BLD VENIPUNCTURE: CPT

## 2021-10-13 PROCEDURE — 80053 COMPREHEN METABOLIC PANEL: CPT

## 2021-10-13 PROCEDURE — 85652 RBC SED RATE AUTOMATED: CPT

## 2021-10-13 PROCEDURE — 85025 COMPLETE CBC W/AUTO DIFF WBC: CPT

## 2021-10-13 PROCEDURE — 86140 C-REACTIVE PROTEIN: CPT

## 2021-10-13 NOTE — TELEPHONE ENCOUNTER
FABM for pt to cb asap. The Ziopatch she received today in our [place] office is  and she will need a new monitor.  SUE

## 2021-10-13 NOTE — TELEPHONE ENCOUNTER
Patient enrolled in the 14 day Zio XT Holter monitoring program per Angel Burgos MD.  In-Clinic [facility].(serial #[id number])    >Currently pending EOS.

## 2021-12-03 ENCOUNTER — OFFICE VISIT (OUTPATIENT)
Dept: VASCULAR LAB | Facility: MEDICAL CENTER | Age: 76
End: 2021-12-03
Attending: FAMILY MEDICINE
Payer: MEDICARE

## 2021-12-03 VITALS
WEIGHT: 156 LBS | HEIGHT: 67 IN | SYSTOLIC BLOOD PRESSURE: 157 MMHG | BODY MASS INDEX: 24.48 KG/M2 | HEART RATE: 92 BPM | DIASTOLIC BLOOD PRESSURE: 81 MMHG

## 2021-12-03 DIAGNOSIS — Z86.79 S/P ABLATION OF ATRIAL FIBRILLATION: ICD-10-CM

## 2021-12-03 DIAGNOSIS — Z98.890 S/P ABLATION OF ATRIAL FIBRILLATION: ICD-10-CM

## 2021-12-03 DIAGNOSIS — E78.00 PRIMARY HYPERCHOLESTEROLEMIA: ICD-10-CM

## 2021-12-03 DIAGNOSIS — I48.0 PAF (PAROXYSMAL ATRIAL FIBRILLATION) (HCC): ICD-10-CM

## 2021-12-03 DIAGNOSIS — E03.9 HYPOTHYROIDISM, UNSPECIFIED TYPE: ICD-10-CM

## 2021-12-03 DIAGNOSIS — Z79.02 LONG TERM (CURRENT) USE OF ANTITHROMBOTICS/ANTIPLATELETS: ICD-10-CM

## 2021-12-03 DIAGNOSIS — I10 PRIMARY HYPERTENSION: ICD-10-CM

## 2021-12-03 DIAGNOSIS — M35.3 PMR (POLYMYALGIA RHEUMATICA) (HCC): ICD-10-CM

## 2021-12-03 PROBLEM — D64.9 ANEMIA, UNSPECIFIED TYPE: Status: ACTIVE | Noted: 2021-11-23

## 2021-12-03 PROBLEM — R05.8 NON-PRODUCTIVE COUGH: Status: ACTIVE | Noted: 2021-11-09

## 2021-12-03 PROBLEM — H66.90 OTITIS MEDIA, UNSPECIFIED LATERALITY, UNSPECIFIED OTITIS MEDIA TYPE: Status: ACTIVE | Noted: 2021-11-09

## 2021-12-03 PROBLEM — E87.1 HYPONATREMIA: Status: ACTIVE | Noted: 2021-11-23

## 2021-12-03 PROBLEM — N30.01 ACUTE CYSTITIS WITH HEMATURIA: Status: ACTIVE | Noted: 2021-11-09

## 2021-12-03 PROBLEM — H66.93 BILATERAL OTITIS MEDIA, UNSPECIFIED OTITIS MEDIA TYPE: Status: ACTIVE | Noted: 2021-11-09

## 2021-12-03 PROBLEM — E87.6 HYPOKALEMIA: Status: ACTIVE | Noted: 2021-11-23

## 2021-12-03 PROCEDURE — 99204 OFFICE O/P NEW MOD 45 MIN: CPT | Performed by: FAMILY MEDICINE

## 2021-12-03 PROCEDURE — 99212 OFFICE O/P EST SF 10 MIN: CPT

## 2021-12-03 RX ORDER — ATORVASTATIN CALCIUM 20 MG/1
20 TABLET, FILM COATED ORAL DAILY
Qty: 90 TABLET | Refills: 3 | Status: SHIPPED | OUTPATIENT
Start: 2021-12-03 | End: 2022-02-07

## 2021-12-03 RX ORDER — AMLODIPINE AND BENAZEPRIL HYDROCHLORIDE 5; 40 MG/1; MG/1
1 CAPSULE ORAL DAILY
Qty: 90 CAPSULE | Refills: 3 | Status: SHIPPED | OUTPATIENT
Start: 2021-12-03 | End: 2022-02-07

## 2021-12-03 ASSESSMENT — ENCOUNTER SYMPTOMS
WHEEZING: 0
BRUISES/BLEEDS EASILY: 0
BLOOD IN STOOL: 0
FEVER: 0
FOCAL WEAKNESS: 0
MYALGIAS: 0
SEIZURES: 0
NAUSEA: 0
TREMORS: 0
PALPITATIONS: 0
HEMOPTYSIS: 0
HEADACHES: 0
DIZZINESS: 0
WEAKNESS: 0
VOMITING: 0
CLAUDICATION: 0
SHORTNESS OF BREATH: 0
COUGH: 0
ABDOMINAL PAIN: 0
CHILLS: 0
DIARRHEA: 0

## 2021-12-03 ASSESSMENT — FIBROSIS 4 INDEX: FIB4 SCORE: 1.48

## 2021-12-03 NOTE — PATIENT INSTRUCTIONS
"SODIUM RESTRICTIONS:   - consume no more than 2,300mg of sodium daily (look at food labels) ,or if you have high BP then reduce to no more than 1,500mg of sodium daily   For more information visit: https://www.[employer]/contents/low-sodium-diet-the-basics/print?uiecpBug=0948&source=see_link                 Helpful links:     Https://www.nhlbi.nih.gov/files/docs/public/heart/dash_brief.pdf    Https://www.nhlbi.nih.gov/files/docs/public/heart/new_dash.pdf    http://www.[employer]/consumers/ho/nut.dash.diet.pdf    Free 7-day menu download: https://wwwDials/DASH-Diet-Meal-Plan-Menu/dp/E14K6R12XS    Due to possible diagnosis of Familial Hypercholesterolemia (commonly called \"FH\"), I recommend you review the following website:    thefoundation.org to review more information about the  diagnosis, treatment, and implications it can have on your heart health.   Here is a quick informational handout link:        "

## 2021-12-03 NOTE — PROGRESS NOTES
INITIAL VASCULAR MEDICINE VISIT  Subjective:   Chacha Fraire is a 75 y.o. female  who presents today 21  for   Chief Complaint   Patient presents with   • Office Visit     initially referred by Angel Burgos M.D. for  eval and mgmt of HTN      Subjective      HTN:  Under care of cardiology, has had diff with control.  On  , while driving home did not feel well upon return to home.  HA for a few days.  COVID testing neg.  Dx with dehydration, had cognitive impairment.  Had labs at [facility] on  with low Na.  Currently feeling better.    Home BP lo-150s/80s   24h ABPM completed: not tested  Adherence to current HTN meds: compliant all of the time  Lifestyle factors:   Weight Change since last visit: stable   BMI Readings from Last 5 Encounters:   21 24.43 kg/m²   10/21/21 24.45 kg/m²   10/06/21 24.75 kg/m²   21 24.58 kg/m²   21 26.69 kg/m²     Diet pattern changes since last visit: common adult  Daily salt intake estimate:  Low     EtOH: No  Exercise habits: prior active, but has bursitis and limited   Perceived barriers to care: pain in R hip   Pertinent HTN hx (reviewed at initial visit):   Age at HTN dx: years   (if female, any hx of pregnancy-related HTN): n/a   Past HTN medications: as noted   HTN crises:  No prior hospitalization or crises   Interfering substances/contributing factors:  None    Hyperlipidemia:    Stopped atorva in the past thinking may be contributory to PMR, but has been off x1yr and not helpfu  Hx of clinical ASCVD events: None     Afib:   Seeing Dr. Burgos (EP), s/p ablation.  No OAC therapy.  No current sx    Antiplatelet/anticoagulation: Yes, Details: ASA, no bleeding noted     Type 2 DM:No     CKD  Low GFR   Denies weight loss, poor appetite, worsening edema, SOB, fatigue, gross hematuria, cramping, HA  Not currently seeing nephrology      Sleeping disorder/NADINE: No     Hypothyroidism: No     PMR:  Seeing rheum, dwayne off pred, currently on 2mg     Past  Medical History:   Diagnosis Date   • Benign essential HTN 5/29/2012   • Hyperlipidemia 5/29/2012   • PAF (paroxysmal atrial fibrillation) (HCC) 5/29/2012   • Personal history of prior ablation treatment 5/29/2012     History reviewed. No pertinent surgical history.     Current Outpatient Medications:   •  amlodipine-benazepril, 1 Capsule, Oral, DAILY  •  atorvastatin, 20 mg, Oral, DAILY  •  predniSONE, 3mg daily for 1 month, then 2mg daily for 1 month, then 1mg daily for 1 month then 1mg every other day for 1 month then stop (as tolerated), Taking  •  aspirin, 81 mg, Oral, Q6HRS PRN, Taking  •  acetaminophen, 650 mg, Oral, Q6HRS PRN, Taking  •  omeprazole, 20 mg, Oral, DAILY, Taking  •  Diclofenac Sodium, APPLY 1 GRAM TOPICALLY THREE TIMES DAILY AS NEEDED FOR PAIN, Taking  •  Psyllium (METAMUCIL PO), Take  by mouth., Taking  •  B Complex-Folic Acid (B COMPLEX PLUS PO), 1 Tablet, Oral, DAILY, Taking  •  loratadine, 10 mg, Oral, PRN, Taking  •  Vitamin C, 1,000 mg, Oral, DAILY, Taking  •  Multiple Vitamins-Minerals (MULTIVITAMIN PO), 1 Tablet, Oral, DAILY, Taking  •  vitamin D, 1,000 Units, Oral, DAILY, Taking  •  famotidine, 20 mg, Oral, QDAY PRN, Taking   Allergies   Allergen Reactions   • Crestor [Rosuvastatin Calcium]    • Rosuvastatin      Other reaction(s): .Unknown     History reviewed. No pertinent family history.   Social History     Tobacco Use   • Smoking status: Never Smoker   • Smokeless tobacco: Never Used   Substance Use Topics   • Alcohol use: Not on file   • Drug use: Not on file     Review of Systems   Constitutional: Negative for chills, fever and malaise/fatigue.   HENT: Negative for nosebleeds.    Respiratory: Negative for cough, hemoptysis, shortness of breath and wheezing.    Cardiovascular: Negative for chest pain, palpitations, claudication and leg swelling.   Gastrointestinal: Negative for abdominal pain, blood in stool, diarrhea, nausea and vomiting.   Musculoskeletal: Negative for joint  "pain and myalgias.   Skin: Negative for itching and rash.   Neurological: Negative for dizziness, tremors, focal weakness, seizures, weakness and headaches.   Endo/Heme/Allergies: Does not bruise/bleed easily.         Objective      Objective:     Vitals:    12/03/21 0916 12/03/21 0920   BP: (!) 166/94 157/81   BP Location: Left arm Left arm   Patient Position: Sitting Sitting   BP Cuff Size: Adult Adult   Pulse: 92 92   Weight: 70.8 kg (156 lb)    Height: 1.702 m (5' 7\")       BP Readings from Last 5 Encounters:   12/03/21 157/81   10/21/21 148/77   10/06/21 152/80   06/23/21 153/73   03/18/21 (!) 167/85      Body mass index is 24.43 kg/m².  Physical Exam  Vitals reviewed.   Constitutional:       General: She is not in acute distress.     Appearance: Normal appearance.   HENT:      Head: Normocephalic and atraumatic.   Neck:      Thyroid: No thyroid mass.      Vascular: Normal carotid pulses.      Trachea: Trachea normal.   Cardiovascular:      Rate and Rhythm: Normal rate and regular rhythm.      Chest Wall: PMI is not displaced.      Pulses: Normal pulses.           Carotid pulses are 2+ on the right side and 2+ on the left side.       Radial pulses are 2+ on the right side and 2+ on the left side.        Dorsalis pedis pulses are 2+ on the right side and 2+ on the left side.        Posterior tibial pulses are 2+ on the right side and 2+ on the left side.      Heart sounds: Normal heart sounds.   Pulmonary:      Effort: Pulmonary effort is normal.      Breath sounds: Normal breath sounds.   Musculoskeletal:      Cervical back: Full passive range of motion without pain.      Right lower leg: No edema.      Left lower leg: No edema.   Skin:     General: Skin is warm and dry.      Capillary Refill: Capillary refill takes less than 2 seconds.      Coloration: Skin is not cyanotic.      Nails: There is no clubbing.   Neurological:      General: No focal deficit present.      Mental Status: She is alert and oriented to " person, place, and time. Mental status is at baseline.      Cranial Nerves: Cranial nerves are intact. No cranial nerve deficit.      Coordination: Coordination is intact. Coordination normal.      Gait: Gait is intact. Gait normal.   Psychiatric:         Mood and Affect: Mood normal.         Behavior: Behavior normal.       Lab Results   Component Value Date    CHOLSTRLTOT 285 (H) 10/01/2021     (H) 10/01/2021    HDL 55 10/01/2021    TRIGLYCERIDE 181 (H) 10/01/2021      No results found for: LIPOPROTA   No results found for: APOB           Lab Results   Component Value Date    SODIUM 141 10/13/2021    POTASSIUM 4.3 10/13/2021    CHLORIDE 103 10/13/2021    CO2 27 10/13/2021    GLUCOSE 88 10/13/2021    BUN 29 (H) 10/13/2021    CREATININE 1.23 10/13/2021    BUNCREATRAT 25 2020    IFAFRICA 51 (A) 10/13/2021    IFNOTAFR 42 (A) 10/13/2021        Lab Results   Component Value Date    WBC 10.6 10/13/2021    RBC 4.89 10/13/2021    HEMOGLOBIN 14.2 10/13/2021    HEMATOCRIT 45.5 10/13/2021    MCV 93.0 10/13/2021    MCH 29.0 10/13/2021    MCHC 31.2 (L) 10/13/2021    MPV 10.8 10/13/2021         No results found for: MICROALBCALC, MALBCRT, MALBEXCR, ZSNCNI62, MICROALBUR, MICRALB, UMICROALBUM, MICROALBTIM     VASCULAR IMAGING:   Last EKG:   Results for orders placed or performed in visit on 10/06/21   EKG   Result Value Ref Range    Report       [facility]    Test Date:  2021-10-06  Pt Name:    CLARITA BUTLER          Department: [facility]  MRN:        [id number]                      Room:  Gender:     Female                       Technician: CGP  :        [id number]                   Requested By:ANGEL BURGOS  Order #:    [id number]                    Reading MD: Angel Burgos MD    Measurements  Intervals                                Axis  Rate:       69                           P:  CO:                                      QRS:        42  QRSD:       82                           T:           52  QT:         448  QTc:        480    Interpretive Statements  SINUS RHYTHM  Compared to ECG 02/03/2021 09:52:47  Unchanged  Electronically Signed On 10-6-2021 10:21:25 PDT by Angel Burgos MD            Medical Decision Making:  Today's Assessment / Status / Plan:     1. Primary hypertension  amlodipine-benazepril (LOTREL) 5-40 MG per capsule    MICROALBUMIN CREAT RATIO URINE    URINALYSIS    Comp Metabolic Panel    Lipoprotein (a)    atorvastatin (LIPITOR) 20 MG Tab   2. PAF (paroxysmal atrial fibrillation) (HCC)     3. Primary hypercholesterolemia  MICROALBUMIN CREAT RATIO URINE    URINALYSIS    Comp Metabolic Panel    Lipoprotein (a)    atorvastatin (LIPITOR) 20 MG Tab    Lipid Profile   4. S/P ablation of atrial fibrillation     5. PMR (polymyalgia rheumatica) ([facility])     6. Hypothyroidism, unspecified type     7. Long term (current) use of antithrombotics/antiplatelets       Patient Type: Primary Prevention    Etiology of Established CVD if Present:     1) Afib, s/p ablation   - ongoing care with cardiology for decision on OAC, rate/rhythm control and monitoring    BLOOD PRESSURE MANAGEMENT:  ACC/AHA (2017) goal <130/80  Home BP at goal:  no  Office BP at goal:  No  24h ABPM: not ordered to date  Indications of end organ damage: None  Device candidate? No due to age   Plan:   Monitoring:   - start/continue home BP monitoring, reviewed correct technique, provide BP log and instructions  - order 24h ABPM:  NO  - monitor lytes/gfr routinely   - contact office if BP consistently >140/>90 for discussion of tx adjustments   Medications:  ACEi/ARB: stop lisinopril, start benazepril 40mg QHS (combo)  DHP-CCB: start amlodipine 5mg QHS (combo)  Thiazide: stop maxzide for now due to prior hx of dehydration and Na = 126  Gregory-receptor Antagonist: not indicated at this time     LIPID MANAGEMENT:   Qualifies for Statin Therapy Based on 2018 ACC/AHA Guidelines: yes, Primary Severe HLD / FH (baseline LDL-C >190)  The 10-year  ASCVD risk score (Antoinegeronimo KIM Jr., et al., 2013) is: 30.9%   - entire family with severe HLD, no premature ASCVD though, possible FH  Major ASCVD events: None  High-risk conditions: N/A  Risk-enhancers: Persistently elevated LDL-C >159  Currently on Statin: restart atorva 20mg   Treatment goals: reduce LDL-C >50%   At goal? no  Plan:   - reinforced ongoing TLC measures as noted   - monitor labs in 6wks with lp(a)  Meds:   - restart atorva 20mg, likely will need 40mg daily   - conside zetia as next 10mg     ANTITHROMBOTIC THERAPY:  - continue ASA 325mg daily per cardiology     GLYCEMIC STATUS: Normal    LIFESTYLE INTERVENTIONS:    SMOKING:   reports that she has never smoked. She has never used smokeless tobacco.   - continued complete avoidance of all tobacco products     PHYSICAL ACTIVITY: continue healthy activity to improve CV fitness.  In general, targeting >150min/week of moderate-level activity.  Additional details reviewed with patient and/or outlined in care instructions     WEIGHT MANAGEMENT AND NUTRITION: Dietary plan was discussed with patient at this visit including DASH-dietary approaches, substitution of MUFA/PUFA for saturated fats, substituting whole grains for simple carbs, substituting lean meats for fatty meats, increase use of plant-based protein vs animal-based, lowered sodium.  Additional details reviewed with patient and/or outlined in care instructions      OTHER:    # PMR - if uncontrolled, will worsening inflam changes and increase risk for ASCVD, on chronic pred therapy    defer to rheum    # elevated liver enzymes - mild ALT elevation,  AST normal   - will monitor     Instructed to follow-up with PCP for remainder of adult medical needs: yes  We will partner with other providers in the management of established vascular disease and cardiometabolic risk factors.    Studies to Be Obtained: none    Labs to Be Obtained: as noted above     Follow up in: 6 weeks with aprn, then f/u with me for next  visit     Kenn Bedoya M.D.  [facility]   [facility]   [phone]

## 2022-01-26 ENCOUNTER — HOSPITAL ENCOUNTER (OUTPATIENT)
Dept: LAB | Facility: MEDICAL CENTER | Age: 77
End: 2022-01-26
Attending: FAMILY MEDICINE
Payer: MEDICARE

## 2022-01-26 DIAGNOSIS — I10 PRIMARY HYPERTENSION: ICD-10-CM

## 2022-01-26 DIAGNOSIS — E78.00 PRIMARY HYPERCHOLESTEROLEMIA: ICD-10-CM

## 2022-01-26 LAB
ALBUMIN SERPL BCP-MCNC: 4.7 G/DL (ref 3.2–4.9)
ALBUMIN/GLOB SERPL: 1.7 G/DL
ALP SERPL-CCNC: 95 U/L (ref 30–99)
ALT SERPL-CCNC: 32 U/L (ref 2–50)
ANION GAP SERPL CALC-SCNC: 15 MMOL/L (ref 7–16)
APPEARANCE UR: CLEAR
AST SERPL-CCNC: 31 U/L (ref 12–45)
BACTERIA #/AREA URNS HPF: NEGATIVE /HPF
BILIRUB SERPL-MCNC: 0.5 MG/DL (ref 0.1–1.5)
BILIRUB UR QL STRIP.AUTO: NEGATIVE
BUN SERPL-MCNC: 24 MG/DL (ref 8–22)
CALCIUM SERPL-MCNC: 9.7 MG/DL (ref 8.5–10.5)
CHLORIDE SERPL-SCNC: 101 MMOL/L (ref 96–112)
CHOLEST SERPL-MCNC: 206 MG/DL (ref 100–199)
CO2 SERPL-SCNC: 23 MMOL/L (ref 20–33)
COLOR UR: YELLOW
CREAT SERPL-MCNC: 1.11 MG/DL (ref 0.5–1.4)
CREAT UR-MCNC: 91.74 MG/DL
EPI CELLS #/AREA URNS HPF: NORMAL /HPF
FASTING STATUS PATIENT QL REPORTED: NORMAL
GLOBULIN SER CALC-MCNC: 2.7 G/DL (ref 1.9–3.5)
GLUCOSE SERPL-MCNC: 90 MG/DL (ref 65–99)
GLUCOSE UR STRIP.AUTO-MCNC: NEGATIVE MG/DL
HDLC SERPL-MCNC: 58 MG/DL
KETONES UR STRIP.AUTO-MCNC: NEGATIVE MG/DL
LDLC SERPL CALC-MCNC: 122 MG/DL
LEUKOCYTE ESTERASE UR QL STRIP.AUTO: NEGATIVE
MICRO URNS: ABNORMAL
MICROALBUMIN UR-MCNC: <1.2 MG/DL
MICROALBUMIN/CREAT UR: NORMAL MG/G (ref 0–30)
NITRITE UR QL STRIP.AUTO: NEGATIVE
PH UR STRIP.AUTO: 6 [PH] (ref 5–8)
POTASSIUM SERPL-SCNC: 4.8 MMOL/L (ref 3.6–5.5)
PROT SERPL-MCNC: 7.4 G/DL (ref 6–8.2)
PROT UR QL STRIP: NEGATIVE MG/DL
RBC # URNS HPF: NORMAL /HPF
RBC UR QL AUTO: ABNORMAL
SODIUM SERPL-SCNC: 139 MMOL/L (ref 135–145)
SP GR UR STRIP.AUTO: 1.01
TRIGL SERPL-MCNC: 131 MG/DL (ref 0–149)
UROBILINOGEN UR STRIP.AUTO-MCNC: 0.2 MG/DL
WBC #/AREA URNS HPF: NORMAL /HPF

## 2022-01-26 PROCEDURE — 82043 UR ALBUMIN QUANTITATIVE: CPT

## 2022-01-26 PROCEDURE — 36415 COLL VENOUS BLD VENIPUNCTURE: CPT

## 2022-01-26 PROCEDURE — 81001 URINALYSIS AUTO W/SCOPE: CPT

## 2022-01-26 PROCEDURE — 80061 LIPID PANEL: CPT

## 2022-01-26 PROCEDURE — 80053 COMPREHEN METABOLIC PANEL: CPT

## 2022-01-26 PROCEDURE — 82570 ASSAY OF URINE CREATININE: CPT

## 2022-01-26 PROCEDURE — 83695 ASSAY OF LIPOPROTEIN(A): CPT

## 2022-01-29 LAB — LPA SERPL-MCNC: 120 MG/DL

## 2022-02-02 PROBLEM — E78.41 ELEVATED LIPOPROTEIN(A): Status: ACTIVE | Noted: 2022-02-02

## 2022-02-07 ENCOUNTER — OFFICE VISIT (OUTPATIENT)
Dept: VASCULAR LAB | Facility: MEDICAL CENTER | Age: 77
End: 2022-02-07
Attending: INTERNAL MEDICINE
Payer: MEDICARE

## 2022-02-07 VITALS
BODY MASS INDEX: 25.34 KG/M2 | DIASTOLIC BLOOD PRESSURE: 72 MMHG | HEART RATE: 97 BPM | WEIGHT: 157 LBS | SYSTOLIC BLOOD PRESSURE: 129 MMHG

## 2022-02-07 DIAGNOSIS — I10 PRIMARY HYPERTENSION: ICD-10-CM

## 2022-02-07 DIAGNOSIS — E78.00 PRIMARY HYPERCHOLESTEROLEMIA: ICD-10-CM

## 2022-02-07 PROCEDURE — 99215 OFFICE O/P EST HI 40 MIN: CPT | Performed by: NURSE PRACTITIONER

## 2022-02-07 PROCEDURE — 99212 OFFICE O/P EST SF 10 MIN: CPT | Performed by: NURSE PRACTITIONER

## 2022-02-07 RX ORDER — ATORVASTATIN CALCIUM 40 MG/1
40 TABLET, FILM COATED ORAL NIGHTLY
Qty: 30 TABLET | Refills: 6 | Status: SHIPPED | OUTPATIENT
Start: 2022-02-07 | End: 2022-03-28

## 2022-02-07 RX ORDER — BENAZEPRIL HYDROCHLORIDE 40 MG/1
40 TABLET ORAL DAILY
Qty: 30 TABLET | Refills: 6 | Status: SHIPPED | OUTPATIENT
Start: 2022-02-07 | End: 2022-03-28

## 2022-02-07 RX ORDER — DOXAZOSIN 2 MG/1
2 TABLET ORAL
Qty: 30 TABLET | Refills: 6 | Status: SHIPPED | OUTPATIENT
Start: 2022-02-07 | End: 2022-04-04 | Stop reason: SDUPTHER

## 2022-02-07 ASSESSMENT — FIBROSIS 4 INDEX: FIB4 SCORE: 1.31

## 2022-02-08 NOTE — PROGRESS NOTES
FOLLOW UP VASCULAR MEDICINE VISIT  Subjective:   Chacha Fraire is a 76 y.o. female  who presents today 2/7/22  for   No chief complaint on file.    initially referred by Angel Burgos M.D. for  eval and mgmt of HTN      Subjective      HTN:  Has had a lot of LE swelling since med changes at initial appt.  Taking amlod/benaz daily; describes good med adherence.  She brings her home monitor in today; readings are within 5pts on both sys/coelho    Home BP log: mostly 130-140s/70-80s, HR 70-80s  24h ABPM completed: not tested  Lifestyle factors:   Weight Change since last visit: stable   BMI Readings from Last 5 Encounters:   02/07/22 25.34 kg/m²   01/19/22 25.34 kg/m²   12/08/21 24.43 kg/m²   12/03/21 24.43 kg/m²   10/21/21 24.45 kg/m²     Diet pattern changes since last visit: common adult  Daily salt intake estimate:  Low     EtOH: No  Exercise habits: prior active, but has bursitis and limited   Perceived barriers to care: pain in R hip   Pertinent HTN hx (reviewed at initial visit):   Age at HTN dx: years   (if female, any hx of pregnancy-related HTN): n/a   Past HTN medications: as noted   HTN crises:  No prior hospitalization or crises   Interfering substances/contributing factors:  None    Hyperlipidemia:    Stopped atorva in the past thinking may be contributory to PMR, but has been off x1yr and not helpful  No change in symptoms since restarting in Dec  Hx of clinical ASCVD events: None     Afib:   Seeing Dr. Burgos (EP), s/p ablation.  No OAC therapy.  No current sx.  On ASA    Antiplatelet/anticoagulation: Yes, Details: ASA, no bleeding noted.      CKD  Low GFR   Denies weight loss, poor appetite, SOB, fatigue, gross hematuria, cramping, HA  Not currently seeing nephrology          PMR:  Seeing rheum, recently tapered off prednisone         Current Outpatient Medications:   •  benazepril, 40 mg, Oral, DAILY  •  doxazosin, 2 mg, Oral, QHS  •  atorvastatin, 40 mg, Oral, Nightly  •  predniSONE, 3mg daily for 1  month, then 2mg daily for 1 month, then 1mg daily for 1 month then 1mg every other day for 1 month then stop (as tolerated)  •  aspirin, 81 mg, Oral, Q6HRS PRN  •  acetaminophen, 650 mg, Oral, Q6HRS PRN  •  omeprazole, 20 mg, Oral, DAILY  •  Diclofenac Sodium, APPLY 1 GRAM TOPICALLY THREE TIMES DAILY AS NEEDED FOR PAIN  •  Psyllium (METAMUCIL PO), Take  by mouth.  •  B Complex-Folic Acid (B COMPLEX PLUS PO), 1 Tablet, Oral, DAILY  •  loratadine, 10 mg, Oral, PRN  •  Vitamin C, 1,000 mg, Oral, DAILY  •  Multiple Vitamins-Minerals (MULTIVITAMIN PO), 1 Tablet, Oral, DAILY  •  vitamin D, 1,000 Units, Oral, DAILY  •  famotidine, 20 mg, Oral, QDAY PRN   Allergies   Allergen Reactions   • Crestor [Rosuvastatin Calcium]    • Rosuvastatin      Other reaction(s): .Unknown     History reviewed. No pertinent family history.   Social History     Tobacco Use   • Smoking status: Never Smoker   • Smokeless tobacco: Never Used   Substance Use Topics   • Alcohol use: Not on file   • Drug use: Not on file         Objective      Objective:     Vitals:    02/07/22 1523 02/07/22 1525 02/07/22 1529   BP:  149/72 129/72   Pulse:  92 97   Weight: 71.2 kg (157 lb)        BP Readings from Last 5 Encounters:   02/07/22 129/72   01/19/22 147/87   12/03/21 157/81   10/21/21 148/77   10/06/21 152/80      Body mass index is 25.34 kg/m².  Physical Exam  Vitals reviewed.   Constitutional:       General: She is not in acute distress.     Appearance: Normal appearance.   Neck:      Thyroid: No thyroid mass.      Vascular: Normal carotid pulses.      Trachea: Trachea normal.   Cardiovascular:      Rate and Rhythm: Normal rate and regular rhythm.      Chest Wall: PMI is not displaced.      Pulses: Normal pulses.           Radial pulses are 2+ on the right side and 2+ on the left side.        Dorsalis pedis pulses are 2+ on the right side and 2+ on the left side.      Heart sounds: Normal heart sounds.   Pulmonary:      Effort: Pulmonary effort is  normal. No respiratory distress.      Breath sounds: Normal breath sounds. No wheezing.   Musculoskeletal:         General: Swelling present.      Cervical back: Full passive range of motion without pain.      Right lower leg: Edema present.      Left lower leg: Edema present.      Comments: Walking with cane d/t R hip/knee pain.  Wearing knee brace     Skin:     General: Skin is warm and dry.      Capillary Refill: Capillary refill takes less than 2 seconds.      Coloration: Skin is not cyanotic or pale.      Findings: No erythema or rash.      Nails: There is no clubbing.   Neurological:      General: No focal deficit present.      Mental Status: She is alert and oriented to person, place, and time.      Cranial Nerves: Cranial nerves are intact.      Motor: No weakness.      Coordination: Coordination is intact. Coordination normal.      Gait: Gait is intact.   Psychiatric:         Mood and Affect: Mood normal.         Behavior: Behavior normal.         Thought Content: Thought content normal.       Lab Results   Component Value Date    CHOLSTRLTOT 206 (H) 01/26/2022     (H) 01/26/2022    HDL 58 01/26/2022    TRIGLYCERIDE 131 01/26/2022      Lab Results   Component Value Date/Time    LIPOPROTA 120 (H) 01/26/2022 10:34 AM      No results found for: APOB           Lab Results   Component Value Date    SODIUM 139 01/26/2022    POTASSIUM 4.8 01/26/2022    CHLORIDE 101 01/26/2022    CO2 23 01/26/2022    GLUCOSE 90 01/26/2022    BUN 24 (H) 01/26/2022    CREATININE 1.11 01/26/2022    BUNCREATRAT 25 08/14/2020    IFAFRICA 58 (A) 01/26/2022    IFNOTAFR 48 (A) 01/26/2022        Lab Results   Component Value Date    WBC 10.6 10/13/2021    RBC 4.89 10/13/2021    HEMOGLOBIN 14.2 10/13/2021    HEMATOCRIT 45.5 10/13/2021    MCV 93.0 10/13/2021    MCH 29.0 10/13/2021    MCHC 31.2 (L) 10/13/2021    MPV 10.8 10/13/2021         Lab Results   Component Value Date/Time    MALBCRT see below 01/26/2022 10:32 AM    MICROALBUR  <1.2 2022 10:32 AM        VASCULAR IMAGING:   Last EKG:   Results for orders placed or performed in visit on 10/06/21   EKG   Result Value Ref Range    Report       [facility]    Test Date:  2021-10-06  Pt Name:    CLARITA BUTLER          Department: [facility]  MRN:        [id number]                      Room:  Gender:     Female                       Technician: CGP  :        1945                   Requested By:ANGEL BURGOS  Order #:    [id number]                    Reading MD: Angel Burgos MD    Measurements  Intervals                                Axis  Rate:       69                           P:  IL:                                      QRS:        42  QRSD:       82                           T:          52  QT:         448  QTc:        480    Interpretive Statements  SINUS RHYTHM  Compared to ECG 2021 09:52:47  Unchanged  Electronically Signed On 10-6-2021 10:21:25 PDT by Angel Burgos MD            Medical Decision Making:  Today's Assessment / Status / Plan:     1. Primary hypertension  benazepril (LOTENSIN) 40 MG tablet    doxazosin (CARDURA) 2 MG Tab   2. Primary hypercholesterolemia  atorvastatin (LIPITOR) 40 MG Tab     Patient Type: Primary Prevention    Etiology of Established CVD if Present:     1) Afib, s/p ablation   - ongoing care with cardiology for decision on OAC, rate/rhythm control and monitoring    BLOOD PRESSURE MANAGEMENT:  ACC/AHA (2017) goal <130/80  Home BP at goal:  no  Office BP at goal:  no  24h ABPM: not ordered to date  Indications of end organ damage: None  Device candidate? No due to age   Plan:   Monitoring:   - continue home BP monitoring and excellent record keeping- verified home machine is reading acurate, within 5pts of in-office readings  - order 24h ABPM:  NO  - monitor lytes/gfr routinely   - contact office if BP consistently >140/>90 for discussion of tx adjustments   Medications:  ACEi/ARB: continue benazepril 40mg but no longer in  combo  DHP-CCB: stop amlodipine d/t LE swelling  Thiazide: cont to hold maxzide for now due to prior hx of dehydration and Na = 126  Gregory-receptor Antagonist: not indicated at this time   Peripheral alpha blocker: start doxazosin 2mg (1/2 tab) qHS; can increase to 2mg qHS if BP still >135/85 after 2-3 days; pt states understanding and verbalizes dosing instructions.  She is retired RN    LIPID MANAGEMENT:   Qualifies for Statin Therapy Based on 2018 ACC/AHA Guidelines: yes, Primary Severe HLD / FH (baseline LDL-C >190)  The 10-year ASCVD risk score (Antoine KIM Jr., et al., 2013) is: 23.7%   - entire family with severe HLD, no premature ASCVD though, possible FH  Major ASCVD events: None  High-risk conditions: N/A  Risk-enhancers: Persistently elevated LDL-C >159  Currently on statin: yes, atorva 20mg   Treatment goals: reduce LDL-C >50%   At goal? No 1/26/22 nonHDL-148, LDL-122, lp(a) 120  Plan:   - reinforced ongoing TLC measures as noted   - cont fiber and Vit D  Meds:   - increase atorva to 40mg daily   - conside zetia as next 10mg   - order labs at f/u    ANTITHROMBOTIC THERAPY:  - continue ASA 325mg daily per cardiology     GLYCEMIC STATUS: Normal    LIFESTYLE INTERVENTIONS:    SMOKING:   reports that she has never smoked. She has never used smokeless tobacco.   - continued complete avoidance of all tobacco products     PHYSICAL ACTIVITY: encouraged healthy activity to improve CV fitness.  In general, targeting >150min/week of moderate-level activity.  Currently very limited d/t hip/knee pain.  Has ortho f/u, may need hip and or knee sx in very near future    WEIGHT MANAGEMENT AND NUTRITION: Dietary plan was discussed with patient at this visit including DASH-dietary approaches, substitution of MUFA/PUFA for saturated fats, substituting whole grains for simple carbs, substituting lean meats for fatty meats, increase use of plant-based protein vs animal-based, lowered sodium.      OTHER:    # PMR - if uncontrolled,  will worsening inflam changes and increase risk for ASCVD, on chronic pred therapy   Defer to rheum    # hip/knee pain- avoid NSAIDs as much as possible; tylenol, in moderation, OK.   Defer to ortho    Instructed to follow-up with PCP for remainder of adult medical needs: yes  We will partner with other providers in the management of established vascular disease and cardiometabolic risk factors.    Studies to Be Obtained: none    Labs to Be Obtained: none presently    Follow up in: 4wks with HAM, then with BOAZ Cheng  [facility]   [facility]   [phone]      CC:  Dr. Aubrey Pizarro

## 2022-02-16 ENCOUNTER — OFFICE VISIT (OUTPATIENT)
Dept: CARDIOLOGY | Facility: MEDICAL CENTER | Age: 77
End: 2022-02-16
Payer: MEDICARE

## 2022-02-16 VITALS
HEIGHT: 66 IN | SYSTOLIC BLOOD PRESSURE: 132 MMHG | DIASTOLIC BLOOD PRESSURE: 86 MMHG | RESPIRATION RATE: 17 BRPM | BODY MASS INDEX: 25.71 KG/M2 | OXYGEN SATURATION: 96 % | HEART RATE: 81 BPM | WEIGHT: 160 LBS

## 2022-02-16 DIAGNOSIS — I48.0 PAF (PAROXYSMAL ATRIAL FIBRILLATION) (HCC): ICD-10-CM

## 2022-02-16 DIAGNOSIS — Z86.79 S/P ABLATION OF ATRIAL FIBRILLATION: ICD-10-CM

## 2022-02-16 DIAGNOSIS — E03.9 HYPOTHYROIDISM, UNSPECIFIED TYPE: ICD-10-CM

## 2022-02-16 DIAGNOSIS — Z98.890 S/P ABLATION OF ATRIAL FIBRILLATION: ICD-10-CM

## 2022-02-16 DIAGNOSIS — E78.2 MIXED HYPERLIPIDEMIA: Chronic | ICD-10-CM

## 2022-02-16 LAB — EKG IMPRESSION: NORMAL

## 2022-02-16 PROCEDURE — 99214 OFFICE O/P EST MOD 30 MIN: CPT | Mod: 25 | Performed by: INTERNAL MEDICINE

## 2022-02-16 PROCEDURE — 93000 ELECTROCARDIOGRAM COMPLETE: CPT | Performed by: INTERNAL MEDICINE

## 2022-02-16 ASSESSMENT — FIBROSIS 4 INDEX: FIB4 SCORE: 1.31

## 2022-02-16 NOTE — PROGRESS NOTES
Chief Complaint   Patient presents with   • Atrial Fibrillation     Fv DxPAF (paroxysmal atrial fibrillation)         Subjective     Tim Fraire is a 76 y.o. female who presents today with history of atrial fibrillation status post ablation.  Event recorder unremarkable.  Does have a Kardia.  Hypertension and hyperlipidemia being treated by vascular medicine.  Hip or knee problems possibly requires replacement.  Overall stable    Past Medical History:   Diagnosis Date   • Benign essential HTN 5/29/2012   • Hyperlipidemia 5/29/2012   • PAF (paroxysmal atrial fibrillation) (HCC) 5/29/2012   • Personal history of prior ablation treatment 5/29/2012     No past surgical history on file.  No family history on file.  Social History     Socioeconomic History   • Marital status:      Spouse name: Not on file   • Number of children: Not on file   • Years of education: Not on file   • Highest education level: Not on file   Occupational History   • Not on file   Tobacco Use   • Smoking status: Never Smoker   • Smokeless tobacco: Never Used   Substance and Sexual Activity   • Alcohol use: Not on file   • Drug use: Not on file   • Sexual activity: Not on file   Other Topics Concern   • Not on file   Social History Narrative   • Not on file     Social Determinants of Health     Financial Resource Strain: Not on file   Food Insecurity: Not on file   Transportation Needs: Not on file   Physical Activity: Not on file   Stress: Not on file   Social Connections: Not on file   Intimate Partner Violence: Not on file   Housing Stability: Not on file     Allergies   Allergen Reactions   • Crestor [Rosuvastatin Calcium]    • Hydrochlorothiazide W-Triamterene      Other reaction(s): Other (See Comments)  hyponatremia   • Rosuvastatin      Other reaction(s): .Unknown     Outpatient Encounter Medications as of 2/16/2022   Medication Sig Dispense Refill   • benazepril (LOTENSIN) 40 MG tablet Take 1 Tablet by mouth every day. 30  "Tablet 6   • doxazosin (CARDURA) 2 MG Tab Take 1 Tablet by mouth at bedtime. 30 Tablet 6   • atorvastatin (LIPITOR) 40 MG Tab Take 1 Tablet by mouth every evening. 30 Tablet 6   • aspirin (ASA) 325 MG Tab Take 81 mg by mouth every day.     • acetaminophen (TYLENOL) 650 MG CR tablet Take 650 mg by mouth every 6 hours as needed.     • omeprazole (PRILOSEC) 20 MG delayed-release capsule Take 20 mg by mouth every day.     • Diclofenac Sodium 1 % Gel APPLY 1 GRAM TOPICALLY THREE TIMES DAILY AS NEEDED FOR PAIN     • Psyllium (METAMUCIL PO) Take  by mouth.     • B Complex-Folic Acid (B COMPLEX PLUS PO) Take 1 Tab by mouth every day.     • loratadine (CLARITIN) 10 MG Tab Take 10 mg by mouth as needed.     • Ascorbic Acid (VITAMIN C) 1000 MG TABS Take 1,000 mg by mouth every day.     • Multiple Vitamins-Minerals (MULTIVITAMIN PO) Take 1 Tablet by mouth every day.     • vitamin D (CHOLECALCIFEROL) 1000 UNIT TABS Take 1,000 Units by mouth every day. D3     • famotidine (PEPCID) 20 MG Tab Take 20 mg by mouth 1 time daily as needed.     • [DISCONTINUED] predniSONE (DELTASONE) 1 MG Tab 3mg daily for 1 month, then 2mg daily for 1 month, then 1mg daily for 1 month then 1mg every other day for 1 month then stop (as tolerated) (Patient not taking: Reported on 2/16/2022) 120 Tablet 3     No facility-administered encounter medications on file as of 2/16/2022.     ROS           Objective     /86 (BP Location: Left arm, Patient Position: Sitting, BP Cuff Size: Adult)   Pulse 81   Resp 17   Ht 1.676 m (5' 6\")   Wt 72.6 kg (160 lb)   SpO2 96%   BMI 25.82 kg/m²     Physical Exam  Constitutional:       Appearance: She is well-developed.   HENT:      Head: Normocephalic and atraumatic.   Eyes:      Pupils: Pupils are equal, round, and reactive to light.   Cardiovascular:      Rate and Rhythm: Normal rate and regular rhythm.      Heart sounds: Normal heart sounds. No murmur heard.    No friction rub. No gallop.   Pulmonary:      " Effort: Pulmonary effort is normal.      Breath sounds: Normal breath sounds.   Abdominal:      General: Bowel sounds are normal.      Palpations: Abdomen is soft.   Musculoskeletal:      Cervical back: Normal range of motion and neck supple.   Skin:     General: Skin is warm.   Neurological:      Mental Status: She is alert and oriented to person, place, and time.      Cranial Nerves: No cranial nerve deficit.   Psychiatric:         Behavior: Behavior normal.         Thought Content: Thought content normal.         Judgment: Judgment normal.                Assessment & Plan     1. PAF (paroxysmal atrial fibrillation) ([facility])  EKG    EC-ECHOCARDIOGRAM COMPLETE W/O CONT   2. S/P ablation of atrial fibrillation     3. Mixed hyperlipidemia     4. Hypothyroidism, unspecified type         Medical Decision Making: Today's Assessment/Status/Plan:   1.  Atrial arrhythmias no recurrence.  2.  Hyperlipidemia on Lipitor.  3.  Hypertension followed by vascular medicine.  4.  Follow-up in 6 months.

## 2022-03-08 ENCOUNTER — OFFICE VISIT (OUTPATIENT)
Dept: VASCULAR LAB | Facility: MEDICAL CENTER | Age: 77
End: 2022-03-08
Attending: NURSE PRACTITIONER
Payer: MEDICARE

## 2022-03-08 VITALS
WEIGHT: 163 LBS | BODY MASS INDEX: 25.58 KG/M2 | SYSTOLIC BLOOD PRESSURE: 146 MMHG | HEIGHT: 67 IN | HEART RATE: 88 BPM | DIASTOLIC BLOOD PRESSURE: 75 MMHG

## 2022-03-08 DIAGNOSIS — E78.00 PRIMARY HYPERCHOLESTEROLEMIA: ICD-10-CM

## 2022-03-08 DIAGNOSIS — M79.89 LEG SWELLING: ICD-10-CM

## 2022-03-08 DIAGNOSIS — I10 PRIMARY HYPERTENSION: ICD-10-CM

## 2022-03-08 PROCEDURE — 99212 OFFICE O/P EST SF 10 MIN: CPT

## 2022-03-08 PROCEDURE — 99215 OFFICE O/P EST HI 40 MIN: CPT | Performed by: NURSE PRACTITIONER

## 2022-03-08 RX ORDER — SPIRONOLACTONE 25 MG/1
12.5 TABLET ORAL DAILY
Qty: 30 TABLET | Refills: 3 | Status: SHIPPED | OUTPATIENT
Start: 2022-03-08 | End: 2022-03-28

## 2022-03-08 ASSESSMENT — FIBROSIS 4 INDEX: FIB4 SCORE: 1.31

## 2022-03-08 NOTE — PROGRESS NOTES
FOLLOW UP VASCULAR MEDICINE VISIT  Subjective:   Chacha Fraire is a 76 y.o. female  who presents today 3/8/22  for   Chief Complaint   Patient presents with   • Follow-Up     initially referred by Angel Burgos M.D. for  eval and mgmt of HTN      Subjective      HTN:  Swelling has improved since stopping amlodipine; little to no swelling RLE but still with more swelling on LLE  No pitting  Is wearing compression socks daily  Verfied home monitor accuracy at last visit  Home BP log: mostly 140-150/80s  24h ABPM completed: not tested  Lifestyle factors:   Weight Change since last visit: up 8 lbs since last visit if weight is correct  BMI Readings from Last 5 Encounters:   03/08/22 25.53 kg/m²   02/28/22 23.57 kg/m²   02/16/22 25.82 kg/m²   02/07/22 25.34 kg/m²   01/19/22 25.34 kg/m²     Diet pattern changes since last visit: common adult  Daily salt intake estimate:  Low     EtOH: No  Exercise habits: prior active, but has bursitis and limited   Perceived barriers to care: pain in R hip   Pertinent HTN hx (reviewed at initial visit):   Age at HTN dx: years   (if female, any hx of pregnancy-related HTN): n/a   Past HTN medications: as noted   HTN crises:  No prior hospitalization or crises   Interfering substances/contributing factors:  None    Hyperlipidemia:    Tolerating increased dose of atorva since last visit  Also taking vit D and fiber daily  Hx of clinical ASCVD events: None     Afib:   Seeing Dr. Burgos (EP), s/p ablation.  No OAC therapy.  No current sx.  On ASA    Antiplatelet/anticoagulation: Yes, Details: ASA, no bleeding noted.      CKD  Low GFR   Denies weight loss, poor appetite, SOB, fatigue, gross hematuria, cramping, HA  Not currently seeing nephrology          PMR:  Seeing rheum, recently tapered off prednisone (2/7/22)        Current Outpatient Medications:   •  spironolactone, 12.5 mg, Oral, DAILY  •  aspirin EC, 81 mg, Oral, DAILY  •  benazepril, 40 mg, Oral, DAILY, Taking  •  doxazosin, 2 mg,  "Oral, QHS, Taking  •  atorvastatin, 40 mg, Oral, Nightly, Taking  •  acetaminophen, 650 mg, Oral, Q6HRS PRN, Taking  •  omeprazole, 20 mg, Oral, DAILY, Taking  •  Diclofenac Sodium, APPLY 1 GRAM TOPICALLY THREE TIMES DAILY AS NEEDED FOR PAIN, Taking  •  Psyllium (METAMUCIL PO), Take  by mouth., Taking  •  B Complex-Folic Acid (B COMPLEX PLUS PO), 1 Tablet, Oral, DAILY, Taking  •  loratadine, 10 mg, Oral, PRN, Taking  •  Vitamin C, 1,000 mg, Oral, DAILY, Taking  •  Multiple Vitamins-Minerals (MULTIVITAMIN PO), 1 Tablet, Oral, DAILY, Taking  •  vitamin D, 1,000 Units, Oral, DAILY, Taking  •  famotidine, 20 mg, Oral, QDAY PRN, Taking   Allergies   Allergen Reactions   • Crestor [Rosuvastatin Calcium]    • Hydrochlorothiazide W-Triamterene      Other reaction(s): Other (See Comments)  hyponatremia   • Rosuvastatin      Other reaction(s): .Unknown     History reviewed. No pertinent family history.   Social History     Tobacco Use   • Smoking status: Never Smoker   • Smokeless tobacco: Never Used         Objective      Objective:     Vitals:    03/08/22 1510 03/08/22 1514   BP: 154/76 146/75   BP Location: Left arm Left arm   Patient Position: Sitting Sitting   BP Cuff Size: Adult Adult   Pulse: 84 88   Weight: 73.9 kg (163 lb)    Height: 1.702 m (5' 7\")       BP Readings from Last 5 Encounters:   03/08/22 146/75   02/16/22 132/86   02/07/22 129/72   01/19/22 147/87   12/03/21 157/81      Body mass index is 25.53 kg/m².  Physical Exam  Vitals reviewed.   Constitutional:       General: She is not in acute distress.     Appearance: Normal appearance.   Neck:      Thyroid: No thyroid mass.      Vascular: Normal carotid pulses.      Trachea: Trachea normal.   Cardiovascular:      Rate and Rhythm: Normal rate and regular rhythm.      Chest Wall: PMI is not displaced.      Pulses:           Radial pulses are 2+ on the right side and 2+ on the left side.        Posterior tibial pulses are 2+ on the right side and 1+ on the left " side.      Heart sounds: Normal heart sounds.   Pulmonary:      Effort: Pulmonary effort is normal. No respiratory distress.      Breath sounds: Normal breath sounds. No wheezing.   Musculoskeletal:         General: Swelling present.      Cervical back: Full passive range of motion without pain.      Right lower leg: No edema.      Left lower leg: Edema present.      Comments: Walking with cane d/t R hip/knee pain.    Skin:     General: Skin is warm and dry.      Coloration: Skin is not cyanotic or pale.      Findings: No erythema or rash.      Nails: There is no clubbing.   Neurological:      General: No focal deficit present.      Mental Status: She is alert and oriented to person, place, and time.      Cranial Nerves: Cranial nerves are intact.      Motor: No weakness.      Coordination: Coordination is intact. Coordination normal.      Gait: Gait is intact.   Psychiatric:         Mood and Affect: Mood normal.         Behavior: Behavior normal.         Thought Content: Thought content normal.       Lab Results   Component Value Date    CHOLSTRLTOT 206 (H) 01/26/2022     (H) 01/26/2022    HDL 58 01/26/2022    TRIGLYCERIDE 131 01/26/2022      Lab Results   Component Value Date/Time    LIPOPROTA 120 (H) 01/26/2022 10:34 AM      No results found for: APOB           Lab Results   Component Value Date    SODIUM 139 01/26/2022    POTASSIUM 4.8 01/26/2022    CHLORIDE 101 01/26/2022    CO2 23 01/26/2022    GLUCOSE 90 01/26/2022    BUN 24 (H) 01/26/2022    CREATININE 1.11 01/26/2022    BUNCREATRAT 25 08/14/2020    IFAFRICA 58 (A) 01/26/2022    IFNOTAFR 48 (A) 01/26/2022        Lab Results   Component Value Date    WBC 10.6 10/13/2021    RBC 4.89 10/13/2021    HEMOGLOBIN 14.2 10/13/2021    HEMATOCRIT 45.5 10/13/2021    MCV 93.0 10/13/2021    MCH 29.0 10/13/2021    MCHC 31.2 (L) 10/13/2021    MPV 10.8 10/13/2021         Lab Results   Component Value Date/Time    MALBCRT see below 01/26/2022 10:32 AM    MICROALBUR <1.2  2022 10:32 AM        VASCULAR IMAGING:   Last EKG:   Results for orders placed or performed in visit on 22   EKG   Result Value Ref Range    Report       [facility]    Test Date:  2022  Pt Name:    CLARITA BUTLER                Department: [facility]  MRN:        [id number]                      Room:  Gender:     Female                       Technician: VIC  :        [id number]                   Requested By:ANGEL BURGOS  Order #:    [id number]                    Reading MD: Angel Burgos MD    Measurements  Intervals                                Axis  Rate:       81                           P:          76  DC:         131                          QRS:        59  QRSD:       92                           T:          77  QT:         405  QTc:        470    Interpretive Statements  Sinus rhythm  Compared to ECG 10/06/2021 09:56:10  Electronically Signed On 2022 11:25:10 PST by Angel Burgos MD            Medical Decision Making:  Today's Assessment / Status / Plan:     1. Leg swelling  US-EXTREMITY VENOUS LOWER UNILAT LEFT   2. Primary hypertension  Comp Metabolic Panel    MICROALBUMIN CREAT RATIO URINE    spironolactone (ALDACTONE) 25 MG Tab   3. Primary hypercholesterolemia  Lipid Profile     Patient Type: Primary Prevention    Etiology of Established CVD if Present:     1) Afib, s/p ablation   - ongoing care with cardiology for decision on OAC, rate/rhythm control and monitoring    BLOOD PRESSURE MANAGEMENT:  ACC/AHA (2017) goal <130/80  Home BP at goal:  no  Office BP at goal:  no  24h ABPM: not ordered to date  Indications of end organ damage: None  Device candidate? No due to age   Plan:   Monitoring:   - continue home BP monitoring and excellent record keeping- verified home machine is reading accurate at last visit  - order 24h ABPM:  NO  - monitor lytes/gfr routinely   - contact office if BP consistently >140/>90 for discussion of tx adjustments   Medications:  ACEi/ARB:  "continue benazepril 40mg but no longer in combo  DHP-CCB: continue to hold amlodipine d/t LE swelling  Thiazide: none  Gregory-receptor Antagonist: start spironolactone 12.5mg daily; watch electrolytes closely (has hx of low Na+ on HCTZ)- check lytes in 2wks  Peripheral alpha blocker: continue doxazosin 2mg qHS; has occasional lightheadedness in the AM but she states this is \"tolerable\"    LIPID MANAGEMENT:   Qualifies for Statin Therapy Based on 2018 ACC/AHA Guidelines: yes, Primary Severe HLD / FH (baseline LDL-C >190)  The 10-year ASCVD risk score (Antoine KIM Jr., et al., 2013) is: 29.3%   - entire family with severe HLD, no premature ASCVD though, possible FH  Major ASCVD events: None  High-risk conditions: N/A  Risk-enhancers: Persistently elevated LDL-C >159  Currently on statin: yes, atorva 20mg   Treatment goals: reduce LDL-C >50%   At goal? No 1/26/22 nonHDL-148, LDL-122, lp(a) 120  Plan:   - reinforced ongoing TLC measures as noted   - cont fiber and Vit D  Meds:   - increased atorva to 40mg daily at last visit  - consider zetia as next 10mg   - recheck fasting labs prior to f/u next mo    ANTITHROMBOTIC THERAPY:  - continue ASA 81mg daily per cardiology     GLYCEMIC STATUS: Normal    LIFESTYLE INTERVENTIONS:    SMOKING:   reports that she has never smoked. She has never used smokeless tobacco.   - continued complete avoidance of all tobacco products     PHYSICAL ACTIVITY: encouraged healthy activity to improve CV fitness.  In general, targeting >150min/week of moderate-level activity.  Currently very limited d/t hip/knee pain.  Has ortho f/u, may need hip and or knee sx in very near future    WEIGHT MANAGEMENT AND NUTRITION: Dietary plan was discussed with patient at this visit including DASH-dietary approaches, substitution of MUFA/PUFA for saturated fats, substituting whole grains for simple carbs, substituting lean meats for fatty meats, increase use of plant-based protein vs animal-based, lowered sodium.  "     OTHER:    # PMR - if uncontrolled, will worsening inflam changes and increase risk for ASCVD, on chronic pred therapy   Defer to rheum    # hip/knee pain- avoid NSAIDs as much as possible; tylenol, in moderation, OK.   Defer to ortho    # RLE swelling- pt recalls that this swelling started around the time of an illness back in Nov.  No COVID testing.  She denies any warmth, pain, redness to the area.  No known VTE hx.  Will get venous duplex, especially as she is pending knee sx soon    Instructed to follow-up with PCP for remainder of adult medical needs: yes  We will partner with other providers in the management of established vascular disease and cardiometabolic risk factors.    Studies to Be Obtained: LLE venous duplex, echo as scheduled 5/5/22  Labs to Be Obtained: lipids, cmp, urine for MA    Follow up in: 6wks with BOAZ Cheng  [facility]   [facility]   [phone]      CC:  Dr. Aubrey Pizarro

## 2022-03-23 ENCOUNTER — HOSPITAL ENCOUNTER (OUTPATIENT)
Dept: CARDIOLOGY | Facility: MEDICAL CENTER | Age: 77
End: 2022-03-23
Attending: INTERNAL MEDICINE
Payer: MEDICARE

## 2022-03-23 DIAGNOSIS — I48.0 PAF (PAROXYSMAL ATRIAL FIBRILLATION) (HCC): ICD-10-CM

## 2022-03-23 PROCEDURE — 93306 TTE W/DOPPLER COMPLETE: CPT

## 2022-03-24 LAB
LV EJECT FRACT  99904: 65
LV EJECT FRACT MOD 2C 99903: 58.67
LV EJECT FRACT MOD 4C 99902: 66.15
LV EJECT FRACT MOD BP 99901: 61.52

## 2022-03-24 PROCEDURE — 93306 TTE W/DOPPLER COMPLETE: CPT | Mod: 26 | Performed by: INTERNAL MEDICINE

## 2022-03-25 ENCOUNTER — HOSPITAL ENCOUNTER (OUTPATIENT)
Dept: LAB | Facility: MEDICAL CENTER | Age: 77
End: 2022-03-25
Attending: NURSE PRACTITIONER
Payer: MEDICARE

## 2022-03-25 DIAGNOSIS — G89.29 CHRONIC BILATERAL THORACIC BACK PAIN: ICD-10-CM

## 2022-03-25 DIAGNOSIS — M54.6 CHRONIC BILATERAL THORACIC BACK PAIN: ICD-10-CM

## 2022-03-25 DIAGNOSIS — Z79.52 LONG TERM (CURRENT) USE OF SYSTEMIC STEROIDS: ICD-10-CM

## 2022-03-25 DIAGNOSIS — M35.3 POLYMYALGIA RHEUMATICA (HCC): ICD-10-CM

## 2022-03-25 DIAGNOSIS — E78.00 PRIMARY HYPERCHOLESTEROLEMIA: ICD-10-CM

## 2022-03-25 DIAGNOSIS — M70.61 TROCHANTERIC BURSITIS OF RIGHT HIP: ICD-10-CM

## 2022-03-25 DIAGNOSIS — I10 PRIMARY HYPERTENSION: ICD-10-CM

## 2022-03-25 LAB
ALBUMIN SERPL BCP-MCNC: 4.7 G/DL (ref 3.2–4.9)
ALBUMIN/GLOB SERPL: 2.2 G/DL
ALP SERPL-CCNC: 100 U/L (ref 30–99)
ALT SERPL-CCNC: 23 U/L (ref 2–50)
ANION GAP SERPL CALC-SCNC: 13 MMOL/L (ref 7–16)
AST SERPL-CCNC: 23 U/L (ref 12–45)
BASOPHILS # BLD AUTO: 1.1 % (ref 0–1.8)
BASOPHILS # BLD: 0.08 K/UL (ref 0–0.12)
BILIRUB SERPL-MCNC: 0.4 MG/DL (ref 0.1–1.5)
BUN SERPL-MCNC: 30 MG/DL (ref 8–22)
CALCIUM SERPL-MCNC: 9.7 MG/DL (ref 8.5–10.5)
CHLORIDE SERPL-SCNC: 107 MMOL/L (ref 96–112)
CHOLEST SERPL-MCNC: 199 MG/DL (ref 100–199)
CO2 SERPL-SCNC: 21 MMOL/L (ref 20–33)
CREAT SERPL-MCNC: 1.12 MG/DL (ref 0.5–1.4)
CREAT UR-MCNC: 25.46 MG/DL
CRP SERPL HS-MCNC: 0.31 MG/DL (ref 0–0.75)
EOSINOPHIL # BLD AUTO: 0.23 K/UL (ref 0–0.51)
EOSINOPHIL NFR BLD: 3.1 % (ref 0–6.9)
ERYTHROCYTE [DISTWIDTH] IN BLOOD BY AUTOMATED COUNT: 44.3 FL (ref 35.9–50)
ERYTHROCYTE [SEDIMENTATION RATE] IN BLOOD BY WESTERGREN METHOD: 13 MM/HOUR (ref 0–25)
GFR SERPLBLD CREATININE-BSD FMLA CKD-EPI: 51 ML/MIN/1.73 M 2
GLOBULIN SER CALC-MCNC: 2.1 G/DL (ref 1.9–3.5)
GLUCOSE SERPL-MCNC: 80 MG/DL (ref 65–99)
HCT VFR BLD AUTO: 42.4 % (ref 37–47)
HDLC SERPL-MCNC: 50 MG/DL
HGB BLD-MCNC: 13.3 G/DL (ref 12–16)
IMM GRANULOCYTES # BLD AUTO: 0.04 K/UL (ref 0–0.11)
IMM GRANULOCYTES NFR BLD AUTO: 0.5 % (ref 0–0.9)
LDLC SERPL CALC-MCNC: 121 MG/DL
LYMPHOCYTES # BLD AUTO: 1.42 K/UL (ref 1–4.8)
LYMPHOCYTES NFR BLD: 19.4 % (ref 22–41)
MCH RBC QN AUTO: 28.6 PG (ref 27–33)
MCHC RBC AUTO-ENTMCNC: 31.4 G/DL (ref 33.6–35)
MCV RBC AUTO: 91.2 FL (ref 81.4–97.8)
MICROALBUMIN UR-MCNC: <1.2 MG/DL
MICROALBUMIN/CREAT UR: NORMAL MG/G (ref 0–30)
MONOCYTES # BLD AUTO: 0.52 K/UL (ref 0–0.85)
MONOCYTES NFR BLD AUTO: 7.1 % (ref 0–13.4)
NEUTROPHILS # BLD AUTO: 5.04 K/UL (ref 2–7.15)
NEUTROPHILS NFR BLD: 68.8 % (ref 44–72)
NRBC # BLD AUTO: 0 K/UL
NRBC BLD-RTO: 0 /100 WBC
PLATELET # BLD AUTO: 319 K/UL (ref 164–446)
PMV BLD AUTO: 10.6 FL (ref 9–12.9)
POTASSIUM SERPL-SCNC: 4.6 MMOL/L (ref 3.6–5.5)
PROT SERPL-MCNC: 6.8 G/DL (ref 6–8.2)
RBC # BLD AUTO: 4.65 M/UL (ref 4.2–5.4)
SODIUM SERPL-SCNC: 141 MMOL/L (ref 135–145)
TRIGL SERPL-MCNC: 138 MG/DL (ref 0–149)
WBC # BLD AUTO: 7.3 K/UL (ref 4.8–10.8)

## 2022-03-25 PROCEDURE — 80053 COMPREHEN METABOLIC PANEL: CPT

## 2022-03-25 PROCEDURE — 85652 RBC SED RATE AUTOMATED: CPT

## 2022-03-25 PROCEDURE — 85025 COMPLETE CBC W/AUTO DIFF WBC: CPT

## 2022-03-25 PROCEDURE — 36415 COLL VENOUS BLD VENIPUNCTURE: CPT

## 2022-03-25 PROCEDURE — 82043 UR ALBUMIN QUANTITATIVE: CPT

## 2022-03-25 PROCEDURE — 82570 ASSAY OF URINE CREATININE: CPT

## 2022-03-25 PROCEDURE — 80061 LIPID PANEL: CPT

## 2022-03-25 PROCEDURE — 86140 C-REACTIVE PROTEIN: CPT

## 2022-03-28 ENCOUNTER — OFFICE VISIT (OUTPATIENT)
Dept: VASCULAR LAB | Facility: MEDICAL CENTER | Age: 77
End: 2022-03-28
Attending: FAMILY MEDICINE
Payer: MEDICARE

## 2022-03-28 VITALS
WEIGHT: 161 LBS | HEART RATE: 80 BPM | DIASTOLIC BLOOD PRESSURE: 84 MMHG | SYSTOLIC BLOOD PRESSURE: 145 MMHG | HEIGHT: 67 IN | BODY MASS INDEX: 25.27 KG/M2

## 2022-03-28 DIAGNOSIS — E78.00 PRIMARY HYPERCHOLESTEROLEMIA: ICD-10-CM

## 2022-03-28 DIAGNOSIS — Z86.79 S/P ABLATION OF ATRIAL FIBRILLATION: ICD-10-CM

## 2022-03-28 DIAGNOSIS — Z98.890 S/P ABLATION OF ATRIAL FIBRILLATION: ICD-10-CM

## 2022-03-28 DIAGNOSIS — E03.9 HYPOTHYROIDISM, UNSPECIFIED TYPE: ICD-10-CM

## 2022-03-28 DIAGNOSIS — M35.3 PMR (POLYMYALGIA RHEUMATICA) (HCC): ICD-10-CM

## 2022-03-28 DIAGNOSIS — Z79.02 LONG TERM (CURRENT) USE OF ANTITHROMBOTICS/ANTIPLATELETS: ICD-10-CM

## 2022-03-28 DIAGNOSIS — I48.0 PAF (PAROXYSMAL ATRIAL FIBRILLATION) (HCC): ICD-10-CM

## 2022-03-28 DIAGNOSIS — I10 PRIMARY HYPERTENSION: ICD-10-CM

## 2022-03-28 DIAGNOSIS — M79.89 LEG SWELLING: ICD-10-CM

## 2022-03-28 PROCEDURE — 99212 OFFICE O/P EST SF 10 MIN: CPT

## 2022-03-28 PROCEDURE — 99214 OFFICE O/P EST MOD 30 MIN: CPT | Performed by: FAMILY MEDICINE

## 2022-03-28 RX ORDER — SPIRONOLACTONE 25 MG/1
25 TABLET ORAL DAILY
Qty: 90 TABLET | Refills: 3 | Status: SHIPPED | OUTPATIENT
Start: 2022-03-28 | End: 2023-02-07 | Stop reason: SDUPTHER

## 2022-03-28 RX ORDER — ATORVASTATIN CALCIUM 20 MG/1
20 TABLET, FILM COATED ORAL DAILY
Qty: 90 TABLET | Refills: 3 | Status: SHIPPED | OUTPATIENT
Start: 2022-03-28 | End: 2023-02-07 | Stop reason: SDUPTHER

## 2022-03-28 RX ORDER — BENAZEPRIL HYDROCHLORIDE 40 MG/1
40 TABLET ORAL
Status: SHIPPED | DISCHARGE
Start: 2022-03-28 | End: 2022-04-04 | Stop reason: SDUPTHER

## 2022-03-28 ASSESSMENT — ENCOUNTER SYMPTOMS
COUGH: 0
CHILLS: 0
FEVER: 0
NAUSEA: 0
BRUISES/BLEEDS EASILY: 0
PALPITATIONS: 0
CLAUDICATION: 0
ORTHOPNEA: 0
WEAKNESS: 0
MYALGIAS: 0

## 2022-03-28 ASSESSMENT — FIBROSIS 4 INDEX: FIB4 SCORE: 1.14

## 2022-03-28 NOTE — PROGRESS NOTES
FOLLOW UP VASCULAR MEDICINE VISIT  Subjective:   Chacha Fraire is a. female  who presents 03/28/22  for   Chief Complaint   Patient presents with   • Follow-Up     initially referred by Angel Burgos M.D. for  eval and mgmt of HTN      Subjective      HTN:  Interval hx/concerns: stable, no return of leg swelling.  Had normal echo, still pending LLE duplex.  No current leg pain   Home BP log: mostly 130-140s/70-80s   Lifestyle factors:   Weight Change since last visit: up 8 lbs since last visit if weight is correct  BMI Readings from Last 5 Encounters:   03/28/22 25.22 kg/m²   03/08/22 25.53 kg/m²   02/28/22 23.57 kg/m²   02/16/22 25.82 kg/m²   02/07/22 25.34 kg/m²     Diet pattern changes since last visit: DASH   Daily salt intake estimate:  Low     EtOH: No  Exercise habits: prior active, but has bursitis and limited   Perceived barriers to care: pain in R hip   Pertinent HTN hx (reviewed at initial visit):   Age at HTN dx: years   (if female, any hx of pregnancy-related HTN): n/a   Past HTN medications: as noted   HTN crises:  No prior hospitalization or crises   Interfering substances/contributing factors:  None    Hyperlipidemia:  Increase myalgias since increase atorva 40mg, tolerated 20mg.    Reports differs from PMR location of pain mostly bilat calves, thighs.    Also taking vit D and fiber daily    Afib:   Stable, no changes   Seeing Dr. Burgos (EP), s/p ablation.  No OAC therapy.  No current sx.  On ASA    Antiplatelet/anticoagulation: Yes, Details: ASA, had a few nosebleeds after doubling ASA and taking nsaids one day.  Had cautery.  No recurrence.     CKD  Low GFR   Denies weight loss, poor appetite, SOB, fatigue, gross hematuria, cramping, HA  Not currently seeing nephrology      PMR:  Seeing rheum, off prednisone 2/2022.         Current Outpatient Medications:   •  atorvastatin, 20 mg, Oral, DAILY  •  benazepril, 40 mg, Oral, QHS  •  spironolactone, 25 mg, Oral, DAILY  •  aspirin EC, 81 mg, Oral,  "DAILY, Taking  •  doxazosin, 2 mg, Oral, QHS, Taking  •  acetaminophen, 650 mg, Oral, Q6HRS PRN, PRN  •  omeprazole, 20 mg, Oral, DAILY, Taking  •  Diclofenac Sodium, APPLY 1 GRAM TOPICALLY THREE TIMES DAILY AS NEEDED FOR PAIN, PRN  •  Psyllium (METAMUCIL PO), Take  by mouth., Taking  •  B Complex-Folic Acid (B COMPLEX PLUS PO), 1 Tablet, Oral, DAILY, Taking  •  loratadine, 10 mg, Oral, PRN, PRN  •  Vitamin C, 1,000 mg, Oral, DAILY, Taking  •  Multiple Vitamins-Minerals (MULTIVITAMIN PO), 1 Tablet, Oral, DAILY, Taking  •  vitamin D, 1,000 Units, Oral, DAILY, Taking  •  famotidine, 20 mg, Oral, QDAY PRN, PRN     Allergies   Allergen Reactions   • Crestor [Rosuvastatin Calcium]    • Hydrochlorothiazide W-Triamterene      Other reaction(s): Other (See Comments)  hyponatremia   • Rosuvastatin      Other reaction(s): .Unknown     History reviewed. No pertinent family history.     Social History     Tobacco Use   • Smoking status: Never Smoker   • Smokeless tobacco: Never Used   Review of Systems   Constitutional: Negative for chills and fever.   Respiratory: Negative for cough.    Cardiovascular: Negative for chest pain, palpitations, orthopnea, claudication and leg swelling.   Gastrointestinal: Negative for nausea.   Musculoskeletal: Negative for myalgias.   Neurological: Negative for weakness.   Endo/Heme/Allergies: Does not bruise/bleed easily.         Objective      Objective:     Vitals:    03/28/22 1522 03/28/22 1524   BP: (!) 161/80 145/84   BP Location: Left arm Left arm   Patient Position: Sitting Sitting   BP Cuff Size: Adult Adult   Pulse: 96 80   Weight: 73 kg (161 lb)    Height: 1.702 m (5' 7\")       BP Readings from Last 5 Encounters:   03/28/22 145/84   03/08/22 146/75   02/16/22 132/86   02/07/22 129/72   01/19/22 147/87      Body mass index is 25.22 kg/m².  Physical Exam  Constitutional:       Appearance: Normal appearance.   HENT:      Head: Normocephalic.   Eyes:      Extraocular Movements: Extraocular " movements intact.      Conjunctiva/sclera: Conjunctivae normal.   Pulmonary:      Effort: Pulmonary effort is normal.   Musculoskeletal:      Cervical back: Normal range of motion.   Skin:     General: Skin is dry.      Coloration: Skin is not pale.      Findings: No rash.   Neurological:      General: No focal deficit present.      Mental Status: She is alert and oriented to person, place, and time.   Psychiatric:         Mood and Affect: Mood normal.         Behavior: Behavior normal.       Lab Results   Component Value Date    CHOLSTRLTOT 199 2022     (H) 2022    HDL 50 2022    TRIGLYCERIDE 138 2022      Lab Results   Component Value Date/Time    LIPOPROTA 120 (H) 2022 10:34 AM      No results found for: APOB           Lab Results   Component Value Date    SODIUM 141 2022    POTASSIUM 4.6 2022    CHLORIDE 107 2022    CO2 21 2022    GLUCOSE 80 2022    BUN 30 (H) 2022    CREATININE 1.12 2022    BUNCREATRAT 25 2020    IFAFRICA 58 (A) 2022    IFNOTAFR 48 (A) 2022        Lab Results   Component Value Date    WBC 7.3 2022    RBC 4.65 2022    HEMOGLOBIN 13.3 2022    HEMATOCRIT 42.4 2022    MCV 91.2 2022    MCH 28.6 2022    MCHC 31.4 (L) 2022    MPV 10.6 2022         Lab Results   Component Value Date/Time    MALBCRT see below 2022 11:21 AM    MICROALBUR <1.2 2022 11:21 AM        VASCULAR IMAGING:   Last EKG:   Results for orders placed or performed in visit on 22   EKG   Result Value Ref Range    Report       [facility]    Test Date:  2022  Pt Name:    CLARITA BUTLER                Department: [facility]  MRN:        [id number]                      Room:  Gender:     Female                       Technician: VIC  :        1945                   Requested By:ALYSSA DENSON  Order #:    [id number]                    Carmelo MD: Alyssa  MD Aubrey    Measurements  Intervals                                Axis  Rate:       81                           P:          76  NJ:         131                          QRS:        59  QRSD:       92                           T:          77  QT:         405  QTc:        470    Interpretive Statements  Sinus rhythm  Compared to ECG 10/06/2021 09:56:10  Electronically Signed On 2- 11:25:10 PST by Angel Burgos MD       Echo 3/23/22   The left ventricular ejection fraction is visually estimated to be 65%.  No significant valvular Doppler abnormality.  Normal estimated right atrial pressure, unable to estimate RVSP.   No prior study is available for comparison.     LLE duplex 3/31/22 - pending          Medical Decision Making:  Today's Assessment / Status / Plan:     1. Primary hypertension  benazepril (LOTENSIN) 40 MG tablet    spironolactone (ALDACTONE) 25 MG Tab    Basic Metabolic Panel   2. Primary hypercholesterolemia  atorvastatin (LIPITOR) 20 MG Tab   3. PAF (paroxysmal atrial fibrillation) ([facility])     4. S/P ablation of atrial fibrillation     5. Leg swelling     6. Hypothyroidism, unspecified type     7. Long term (current) use of antithrombotics/antiplatelets     8. PMR (polymyalgia rheumatica) ([facility])       Patient Type: Primary Prevention    Etiology of Established CVD if Present:     1) Afib, s/p ablation - stable   - ongoing care with cardiology for decision on OAC, rate/rhythm control and monitoring    BLOOD PRESSURE MANAGEMENT:  ACC/AHA (2017) goal <130/80, consider <140/80 reasonable due to age and prior med ADRs   Home BP at goal:  Most days   Office BP at goal:  no  24h ABPM: not ordered to date  Indications of end organ damage: None  Device candidate? No due to age   Plan:   Monitoring:   - continue home BP monitoring and excellent record keeping- verified home machine is reading accurate at last visit  - order 24h ABPM:  NO  - monitor lytes/gfr routinely   - contact office if BP consistently  ">140/>90 for discussion of tx adjustments   Medications:  ACEi/ARB: continue benazepril 40mg - swtich to QHS  DHP-CCB: continue to hold amlodipine d/t LE swelling  Thiazide: avoid due to hypoNa+  Gregory-receptor Antagonist: increase spironolactone 25mg daily - check BMP 2-3 weeks  Peripheral alpha blocker: continue doxazosin 2mg qHS; has occasional lightheadedness in the AM but she states this is \"tolerable\"    LIPID MANAGEMENT:   Qualifies for Statin Therapy Based on 2018 ACC/AHA Guidelines: yes, Primary Severe HLD / FH (baseline LDL-C >190)  The 10-year ASCVD risk score (Antoine KIM Jr., et al., 2013) is: 28.7%   - entire family with severe HLD, no premature ASCVD though, possible FH  Lp(a) = 120   Major ASCVD events: None  High-risk conditions: N/A  Risk-enhancers: Persistently elevated LDL-C >159  Currently on statin: yes, did not tolerated atorva 40mg due to myalgias worsening  Treatment goals: reduce LDL-C >50%   At goal? No, 3/2022  Plan:   - reinforced ongoing TLC measures as noted   - cont fiber and Vit D  Meds:   - reduce atorva to 20mg daily due to myalgias   - consider start zetia 10mg if worsening HLD   - monitor labs     ANTITHROMBOTIC THERAPY: continue ASA 81mg daily per cardiology     GLYCEMIC STATUS: Normal    LIFESTYLE INTERVENTIONS:    SMOKING:   reports that she has never smoked. She has never used smokeless tobacco.   - continued complete avoidance of all tobacco products     PHYSICAL ACTIVITY: encouraged healthy activity to improve CV fitness.  In general, targeting >150min/week of moderate-level activity or as much as tolerated    WEIGHT MANAGEMENT AND NUTRITION: Dietary plan was discussed with patient at this visit including DASH-dietary approaches, substitution of MUFA/PUFA for saturated fats, substituting whole grains for simple carbs, substituting lean meats for fatty meats, increase use of plant-based protein vs animal-based, lowered sodium.      OTHER:    # PMR - if uncontrolled, will " worsening inflam changes and increase risk for ASCVD, on chronic pred therapy   Defer to rheum    # hip/knee pain- avoid NSAIDs as much as possible; tylenol, in moderation, OK.   Defer to ortho, pending knee surgery     # nosebleeds - stable, resolved.   Unlikely HTN-associated    Instructed to follow-up with PCP for remainder of adult medical needs: yes  We will partner with other providers in the management of established vascular disease and cardiometabolic risk factors.    Studies to Be Obtained: LLE venous duplex pending   Labs to Be Obtained: as noted     Follow up in:  2mo     Kenn eBdoya M.D.  [facility]   [facility]   [phone]      CC:  Dr. Burgos

## 2022-03-29 ENCOUNTER — TELEPHONE (OUTPATIENT)
Dept: CARDIOLOGY | Facility: MEDICAL CENTER | Age: 77
End: 2022-03-29
Payer: MEDICARE

## 2022-03-29 NOTE — LETTER
March 29, 2022        Chacha Fraire  [address] [address]          Dear Chacha,    We have received the results of your recent:    Imaging of Echocardiogram    Your test came back within normal limits.  Please follow up as previously discussed with your physician.      Feel free to call us with any questions.        Sincerely,          R[place] Ass't of Dr. Burgos  Electronically Signed

## 2022-03-29 NOTE — TELEPHONE ENCOUNTER
----- Message from Noa Muniz R.N. sent at 3/25/2022  3:00 PM PDT -----  Please let the pt know the results are within normal limits  ----- Message -----  From: Angel Burgos M.D.  Sent: 3/25/2022   9:09 AM PDT  To: Noa Muniz R.N.    nl

## 2022-03-31 ENCOUNTER — HOSPITAL ENCOUNTER (OUTPATIENT)
Dept: RADIOLOGY | Facility: MEDICAL CENTER | Age: 77
End: 2022-03-31
Attending: NURSE PRACTITIONER
Payer: MEDICARE

## 2022-03-31 DIAGNOSIS — M79.89 LEG SWELLING: ICD-10-CM

## 2022-03-31 PROCEDURE — 93971 EXTREMITY STUDY: CPT | Mod: LT

## 2022-04-01 ENCOUNTER — TELEPHONE (OUTPATIENT)
Dept: CARDIOLOGY | Facility: MEDICAL CENTER | Age: 77
End: 2022-04-01
Payer: MEDICARE

## 2022-04-01 NOTE — TELEPHONE ENCOUNTER
To DS - ok to proceed?     Received clearance request from [facility] for R TKA.    HX Atrial arrhythmias no recurrence.  2.  Hyperlipidemia on Lipitor.  3.  Hypertension followed by vascular medicine.

## 2022-04-01 NOTE — TELEPHONE ENCOUNTER
Clearance filled out and faxed back to [facility]. Reciept recieved. Clearance sent to be scanned.

## 2022-04-04 DIAGNOSIS — I10 PRIMARY HYPERTENSION: ICD-10-CM

## 2022-04-04 RX ORDER — BENAZEPRIL HYDROCHLORIDE 40 MG/1
40 TABLET ORAL
Qty: 90 TABLET | Refills: 3 | Status: SHIPPED | OUTPATIENT
Start: 2022-04-04 | End: 2023-02-07 | Stop reason: SDUPTHER

## 2022-04-04 RX ORDER — DOXAZOSIN 2 MG/1
2 TABLET ORAL
Qty: 90 TABLET | Refills: 3 | Status: SHIPPED | OUTPATIENT
Start: 2022-04-04 | End: 2023-02-07

## 2022-04-13 ENCOUNTER — PRE-ADMISSION TESTING (OUTPATIENT)
Dept: ADMISSIONS | Facility: MEDICAL CENTER | Age: 77
End: 2022-04-13
Attending: ORTHOPAEDIC SURGERY
Payer: MEDICARE

## 2022-04-13 DIAGNOSIS — Z01.812 PRE-OPERATIVE LABORATORY EXAMINATION: ICD-10-CM

## 2022-04-13 LAB
ANION GAP SERPL CALC-SCNC: 10 MMOL/L (ref 7–16)
BUN SERPL-MCNC: 17 MG/DL (ref 8–22)
CALCIUM SERPL-MCNC: 9.3 MG/DL (ref 8.4–10.2)
CHLORIDE SERPL-SCNC: 107 MMOL/L (ref 96–112)
CO2 SERPL-SCNC: 22 MMOL/L (ref 20–33)
CREAT SERPL-MCNC: 0.98 MG/DL (ref 0.5–1.4)
ERYTHROCYTE [DISTWIDTH] IN BLOOD BY AUTOMATED COUNT: 42.5 FL (ref 35.9–50)
GFR SERPLBLD CREATININE-BSD FMLA CKD-EPI: 60 ML/MIN/1.73 M 2
GLUCOSE SERPL-MCNC: 84 MG/DL (ref 65–99)
HCT VFR BLD AUTO: 40.3 % (ref 37–47)
HGB BLD-MCNC: 12.8 G/DL (ref 12–16)
MCH RBC QN AUTO: 28.8 PG (ref 27–33)
MCHC RBC AUTO-ENTMCNC: 31.8 G/DL (ref 33.6–35)
MCV RBC AUTO: 90.8 FL (ref 81.4–97.8)
PLATELET # BLD AUTO: 290 K/UL (ref 164–446)
PMV BLD AUTO: 10.7 FL (ref 9–12.9)
POTASSIUM SERPL-SCNC: 4.3 MMOL/L (ref 3.6–5.5)
RBC # BLD AUTO: 4.44 M/UL (ref 4.2–5.4)
SODIUM SERPL-SCNC: 139 MMOL/L (ref 135–145)
WBC # BLD AUTO: 8 K/UL (ref 4.8–10.8)

## 2022-04-13 PROCEDURE — 87641 MR-STAPH DNA AMP PROBE: CPT

## 2022-04-13 PROCEDURE — 80048 BASIC METABOLIC PNL TOTAL CA: CPT

## 2022-04-13 PROCEDURE — 85027 COMPLETE CBC AUTOMATED: CPT

## 2022-04-13 PROCEDURE — 36415 COLL VENOUS BLD VENIPUNCTURE: CPT

## 2022-04-13 PROCEDURE — 87640 STAPH A DNA AMP PROBE: CPT | Mod: XU

## 2022-04-13 ASSESSMENT — FIBROSIS 4 INDEX: FIB4 SCORE: 1.14

## 2022-04-13 NOTE — DISCHARGE PLANNING
DISCHARGE PLANNING NOTE - TOTAL JOINT    Procedure: Procedure(s):  ARTHROPLASTY, KNEE, TOTAL  Procedure Date: 4/27/2022  Insurance: Payor: MEDICARE / Plan: MEDICARE PART A & B / Product Type: *No Product type* /    Equipment currently available at home?  cane, front-wheel walker, raised toilet seat and shower chair  Steps into the home? 3  Steps within the home? 2  Toilet height? ADA  Type of shower? tub-shower  Who will be with you during your recovery? Daughter-in-law, son and his wife  Is Outpatient Physical Therapy set up after surgery? No   Did you take the Total Joint Class and where? Yes  Planning same day discharge?Yes     This writer met with pt during his preadmission appointment. Pt states he has all needed equipment except for a fww/crutches. Home safety checklist reviewed and copy given to pt. Pt educated to dc criteria. All questions answered and verbalizes understanding of all instructions. No dc needs identified at this time. Anticipate dc to home without barriers.

## 2022-04-13 NOTE — PREPROCEDURE INSTRUCTIONS
Pre-admit appointment completed. Pt states all instructions given are understood and to call pre-admit or Dr's office for additional questions or any symptoms of illness/covid develop prior to DOS. Medications the patient will take the morning of surgery per anesthesia protocol:  Omeprazole. Instructed to take other prescribed medicines through the day before surgery       Surgery preparation checklist for Joint Replacement Program given to Pt with verbal instructions.

## 2022-04-15 LAB
SCCMEC + MECA PNL NOSE NAA+PROBE: NEGATIVE
SCCMEC + MECA PNL NOSE NAA+PROBE: NEGATIVE

## 2022-04-25 ENCOUNTER — APPOINTMENT (OUTPATIENT)
Dept: ADMISSIONS | Facility: MEDICAL CENTER | Age: 77
End: 2022-04-25
Payer: MEDICARE

## 2022-04-25 ENCOUNTER — TELEPHONE (OUTPATIENT)
Dept: VASCULAR LAB | Facility: MEDICAL CENTER | Age: 77
End: 2022-04-25
Payer: MEDICARE

## 2022-04-25 NOTE — TELEPHONE ENCOUNTER
Called patient and left message to reschedule vascular appointment as Dr. Bedoya will not be in the office that day.

## 2022-04-27 ENCOUNTER — ANESTHESIA EVENT (OUTPATIENT)
Dept: SURGERY | Facility: MEDICAL CENTER | Age: 77
End: 2022-04-27
Payer: MEDICARE

## 2022-04-27 ENCOUNTER — ANESTHESIA (OUTPATIENT)
Dept: SURGERY | Facility: MEDICAL CENTER | Age: 77
End: 2022-04-27
Payer: MEDICARE

## 2022-04-27 ENCOUNTER — HOSPITAL ENCOUNTER (OUTPATIENT)
Facility: MEDICAL CENTER | Age: 77
End: 2022-04-28
Attending: ORTHOPAEDIC SURGERY | Admitting: ORTHOPAEDIC SURGERY
Payer: MEDICARE

## 2022-04-27 ENCOUNTER — APPOINTMENT (OUTPATIENT)
Dept: RADIOLOGY | Facility: MEDICAL CENTER | Age: 77
End: 2022-04-27
Attending: STUDENT IN AN ORGANIZED HEALTH CARE EDUCATION/TRAINING PROGRAM
Payer: MEDICARE

## 2022-04-27 DIAGNOSIS — M17.11 ARTHRITIS OF KNEE, RIGHT: Primary | ICD-10-CM

## 2022-04-27 DIAGNOSIS — H66.90 OTITIS MEDIA, UNSPECIFIED LATERALITY, UNSPECIFIED OTITIS MEDIA TYPE: ICD-10-CM

## 2022-04-27 PROBLEM — M17.10 OSTEOARTHRITIS OF KNEE, UNILATERAL: Status: ACTIVE | Noted: 2022-04-27

## 2022-04-27 PROCEDURE — C1776 JOINT DEVICE (IMPLANTABLE): HCPCS | Performed by: ORTHOPAEDIC SURGERY

## 2022-04-27 PROCEDURE — L8699 PROSTHETIC IMPLANT NOS: HCPCS | Performed by: ORTHOPAEDIC SURGERY

## 2022-04-27 PROCEDURE — 160041 HCHG SURGERY MINUTES - EA ADDL 1 MIN LEVEL 4: Performed by: ORTHOPAEDIC SURGERY

## 2022-04-27 PROCEDURE — A9270 NON-COVERED ITEM OR SERVICE: HCPCS | Performed by: STUDENT IN AN ORGANIZED HEALTH CARE EDUCATION/TRAINING PROGRAM

## 2022-04-27 PROCEDURE — 700111 HCHG RX REV CODE 636 W/ 250 OVERRIDE (IP): Performed by: STUDENT IN AN ORGANIZED HEALTH CARE EDUCATION/TRAINING PROGRAM

## 2022-04-27 PROCEDURE — 502579 HCHG PACK, TOTAL KNEE: Performed by: ORTHOPAEDIC SURGERY

## 2022-04-27 PROCEDURE — 160029 HCHG SURGERY MINUTES - 1ST 30 MINS LEVEL 4: Performed by: ORTHOPAEDIC SURGERY

## 2022-04-27 PROCEDURE — 700105 HCHG RX REV CODE 258: Performed by: STUDENT IN AN ORGANIZED HEALTH CARE EDUCATION/TRAINING PROGRAM

## 2022-04-27 PROCEDURE — 64447 NJX AA&/STRD FEMORAL NRV IMG: CPT | Performed by: ORTHOPAEDIC SURGERY

## 2022-04-27 PROCEDURE — 700111 HCHG RX REV CODE 636 W/ 250 OVERRIDE (IP): Performed by: ANESTHESIOLOGY

## 2022-04-27 PROCEDURE — G0378 HOSPITAL OBSERVATION PER HR: HCPCS

## 2022-04-27 PROCEDURE — 160035 HCHG PACU - 1ST 60 MINS PHASE I: Performed by: ORTHOPAEDIC SURGERY

## 2022-04-27 PROCEDURE — 160048 HCHG OR STATISTICAL LEVEL 1-5: Performed by: ORTHOPAEDIC SURGERY

## 2022-04-27 PROCEDURE — 700102 HCHG RX REV CODE 250 W/ 637 OVERRIDE(OP): Performed by: STUDENT IN AN ORGANIZED HEALTH CARE EDUCATION/TRAINING PROGRAM

## 2022-04-27 PROCEDURE — 73560 X-RAY EXAM OF KNEE 1 OR 2: CPT | Mod: RT

## 2022-04-27 PROCEDURE — 500002 HCHG ADHESIVE, DERMABOND: Performed by: ORTHOPAEDIC SURGERY

## 2022-04-27 PROCEDURE — 700105 HCHG RX REV CODE 258: Performed by: ORTHOPAEDIC SURGERY

## 2022-04-27 PROCEDURE — 160009 HCHG ANES TIME/MIN: Performed by: ORTHOPAEDIC SURGERY

## 2022-04-27 PROCEDURE — 700101 HCHG RX REV CODE 250: Performed by: STUDENT IN AN ORGANIZED HEALTH CARE EDUCATION/TRAINING PROGRAM

## 2022-04-27 PROCEDURE — 64447 NJX AA&/STRD FEMORAL NRV IMG: CPT | Mod: XU | Performed by: ANESTHESIOLOGY

## 2022-04-27 PROCEDURE — 27447 TOTAL KNEE ARTHROPLASTY: CPT | Mod: RT | Performed by: ORTHOPAEDIC SURGERY

## 2022-04-27 PROCEDURE — 99100 ANES PT EXTEME AGE<1 YR&>70: CPT | Performed by: ANESTHESIOLOGY

## 2022-04-27 PROCEDURE — 700111 HCHG RX REV CODE 636 W/ 250 OVERRIDE (IP): Performed by: ORTHOPAEDIC SURGERY

## 2022-04-27 PROCEDURE — 160002 HCHG RECOVERY MINUTES (STAT): Performed by: ORTHOPAEDIC SURGERY

## 2022-04-27 PROCEDURE — 501838 HCHG SUTURE GENERAL: Performed by: ORTHOPAEDIC SURGERY

## 2022-04-27 PROCEDURE — 94760 N-INVAS EAR/PLS OXIMETRY 1: CPT

## 2022-04-27 PROCEDURE — 96365 THER/PROPH/DIAG IV INF INIT: CPT

## 2022-04-27 PROCEDURE — 502000 HCHG MISC OR IMPLANTS RC 0278: Performed by: ORTHOPAEDIC SURGERY

## 2022-04-27 PROCEDURE — 700101 HCHG RX REV CODE 250: Performed by: ANESTHESIOLOGY

## 2022-04-27 PROCEDURE — 700101 HCHG RX REV CODE 250: Performed by: ORTHOPAEDIC SURGERY

## 2022-04-27 PROCEDURE — 700102 HCHG RX REV CODE 250 W/ 637 OVERRIDE(OP): Performed by: ANESTHESIOLOGY

## 2022-04-27 PROCEDURE — 01402 ANES OPN/ARTH TOT KNE ARTHRP: CPT | Performed by: ANESTHESIOLOGY

## 2022-04-27 PROCEDURE — 160036 HCHG PACU - EA ADDL 30 MINS PHASE I: Performed by: ORTHOPAEDIC SURGERY

## 2022-04-27 PROCEDURE — A9270 NON-COVERED ITEM OR SERVICE: HCPCS | Performed by: ANESTHESIOLOGY

## 2022-04-27 PROCEDURE — 27447 TOTAL KNEE ARTHROPLASTY: CPT | Mod: 80,RT | Performed by: STUDENT IN AN ORGANIZED HEALTH CARE EDUCATION/TRAINING PROGRAM

## 2022-04-27 DEVICE — IMPLANT GII OVAL RESURFACING PAT 32MM (1EA): Type: IMPLANTABLE DEVICE | Site: KNEE | Status: FUNCTIONAL

## 2022-04-27 DEVICE — BASE TIBIAL NONPOROUS JOURNEY II RIGHT SIZE 3: Type: IMPLANTABLE DEVICE | Site: KNEE | Status: FUNCTIONAL

## 2022-04-27 DEVICE — IMPLANTABLE DEVICE: Type: IMPLANTABLE DEVICE | Site: KNEE | Status: FUNCTIONAL

## 2022-04-27 DEVICE — CEMENT ORTHOPEDIC HV US  (10/PK): Type: IMPLANTABLE DEVICE | Site: KNEE | Status: FUNCTIONAL

## 2022-04-27 DEVICE — MATRIX TISSUE PLACENTAL PARTICULATE ALLOGRAFT AMNIOBAND 160MG (1EA): Type: IMPLANTABLE DEVICE | Site: KNEE | Status: FUNCTIONAL

## 2022-04-27 DEVICE — FEMUR BCS OXINIUM JOURNEY II RIGHT SIZE 5: Type: IMPLANTABLE DEVICE | Site: KNEE | Status: FUNCTIONAL

## 2022-04-27 RX ORDER — AMOXICILLIN 250 MG
1 CAPSULE ORAL
Status: DISCONTINUED | OUTPATIENT
Start: 2022-04-27 | End: 2022-04-28 | Stop reason: HOSPADM

## 2022-04-27 RX ORDER — TRAMADOL HYDROCHLORIDE 50 MG/1
50 TABLET ORAL EVERY 4 HOURS PRN
Qty: 60 TABLET | Refills: 0 | Status: SHIPPED | OUTPATIENT
Start: 2022-04-27 | End: 2022-05-12

## 2022-04-27 RX ORDER — ROCURONIUM BROMIDE 10 MG/ML
INJECTION, SOLUTION INTRAVENOUS PRN
Status: DISCONTINUED | OUTPATIENT
Start: 2022-04-27 | End: 2022-04-27 | Stop reason: SURG

## 2022-04-27 RX ORDER — DEXAMETHASONE SODIUM PHOSPHATE 4 MG/ML
4 INJECTION, SOLUTION INTRA-ARTICULAR; INTRALESIONAL; INTRAMUSCULAR; INTRAVENOUS; SOFT TISSUE
Status: DISCONTINUED | OUTPATIENT
Start: 2022-04-27 | End: 2022-04-28 | Stop reason: HOSPADM

## 2022-04-27 RX ORDER — ENEMA 19; 7 G/133ML; G/133ML
1 ENEMA RECTAL
Status: DISCONTINUED | OUTPATIENT
Start: 2022-04-27 | End: 2022-04-28 | Stop reason: HOSPADM

## 2022-04-27 RX ORDER — HYDROMORPHONE HYDROCHLORIDE 1 MG/ML
0.2 INJECTION, SOLUTION INTRAMUSCULAR; INTRAVENOUS; SUBCUTANEOUS
Status: DISCONTINUED | OUTPATIENT
Start: 2022-04-27 | End: 2022-04-27 | Stop reason: HOSPADM

## 2022-04-27 RX ORDER — TRANEXAMIC ACID 100 MG/ML
1000 INJECTION, SOLUTION INTRAVENOUS ONCE
Status: COMPLETED | OUTPATIENT
Start: 2022-04-27 | End: 2022-04-27

## 2022-04-27 RX ORDER — TRAMADOL HYDROCHLORIDE 50 MG/1
50 TABLET ORAL EVERY 4 HOURS PRN
Status: DISCONTINUED | OUTPATIENT
Start: 2022-04-27 | End: 2022-04-28 | Stop reason: HOSPADM

## 2022-04-27 RX ORDER — ATORVASTATIN CALCIUM 20 MG/1
20 TABLET, FILM COATED ORAL DAILY
Status: DISCONTINUED | OUTPATIENT
Start: 2022-04-27 | End: 2022-04-28 | Stop reason: HOSPADM

## 2022-04-27 RX ORDER — HYDROMORPHONE HYDROCHLORIDE 1 MG/ML
0.1 INJECTION, SOLUTION INTRAMUSCULAR; INTRAVENOUS; SUBCUTANEOUS
Status: DISCONTINUED | OUTPATIENT
Start: 2022-04-27 | End: 2022-04-27 | Stop reason: HOSPADM

## 2022-04-27 RX ORDER — POLYETHYLENE GLYCOL 3350 17 G/17G
1 POWDER, FOR SOLUTION ORAL 2 TIMES DAILY PRN
Status: DISCONTINUED | OUTPATIENT
Start: 2022-04-27 | End: 2022-04-28 | Stop reason: HOSPADM

## 2022-04-27 RX ORDER — OXYCODONE HCL 5 MG/5 ML
5 SOLUTION, ORAL ORAL
Status: COMPLETED | OUTPATIENT
Start: 2022-04-27 | End: 2022-04-27

## 2022-04-27 RX ORDER — CEFAZOLIN SODIUM IN 0.9 % NACL 2 G/100 ML
2 PLASTIC BAG, INJECTION (ML) INTRAVENOUS ONCE
Status: COMPLETED | OUTPATIENT
Start: 2022-04-27 | End: 2022-04-27

## 2022-04-27 RX ORDER — DEXAMETHASONE SODIUM PHOSPHATE 4 MG/ML
10 INJECTION, SOLUTION INTRA-ARTICULAR; INTRALESIONAL; INTRAMUSCULAR; INTRAVENOUS; SOFT TISSUE ONCE
Status: COMPLETED | OUTPATIENT
Start: 2022-04-28 | End: 2022-04-28

## 2022-04-27 RX ORDER — PHENYLEPHRINE HCL IN 0.9% NACL 0.5 MG/5ML
SYRINGE (ML) INTRAVENOUS PRN
Status: DISCONTINUED | OUTPATIENT
Start: 2022-04-27 | End: 2022-04-27 | Stop reason: SURG

## 2022-04-27 RX ORDER — HYDROMORPHONE HYDROCHLORIDE 1 MG/ML
0.4 INJECTION, SOLUTION INTRAMUSCULAR; INTRAVENOUS; SUBCUTANEOUS
Status: DISCONTINUED | OUTPATIENT
Start: 2022-04-27 | End: 2022-04-27 | Stop reason: HOSPADM

## 2022-04-27 RX ORDER — HALOPERIDOL 5 MG/ML
1 INJECTION INTRAMUSCULAR
Status: DISCONTINUED | OUTPATIENT
Start: 2022-04-27 | End: 2022-04-27 | Stop reason: HOSPADM

## 2022-04-27 RX ORDER — DIPHENHYDRAMINE HYDROCHLORIDE 50 MG/ML
12.5 INJECTION INTRAMUSCULAR; INTRAVENOUS
Status: DISCONTINUED | OUTPATIENT
Start: 2022-04-27 | End: 2022-04-27 | Stop reason: HOSPADM

## 2022-04-27 RX ORDER — KETOROLAC TROMETHAMINE 30 MG/ML
INJECTION, SOLUTION INTRAMUSCULAR; INTRAVENOUS
Status: DISCONTINUED | OUTPATIENT
Start: 2022-04-27 | End: 2022-04-27 | Stop reason: HOSPADM

## 2022-04-27 RX ORDER — DOCUSATE SODIUM 100 MG/1
100 CAPSULE, LIQUID FILLED ORAL 2 TIMES DAILY
Status: DISCONTINUED | OUTPATIENT
Start: 2022-04-27 | End: 2022-04-28 | Stop reason: HOSPADM

## 2022-04-27 RX ORDER — OXYCODONE HYDROCHLORIDE 10 MG/1
10 TABLET ORAL
Status: DISCONTINUED | OUTPATIENT
Start: 2022-04-27 | End: 2022-04-28 | Stop reason: HOSPADM

## 2022-04-27 RX ORDER — DOXAZOSIN 2 MG/1
2 TABLET ORAL
Status: DISCONTINUED | OUTPATIENT
Start: 2022-04-27 | End: 2022-04-28 | Stop reason: HOSPADM

## 2022-04-27 RX ORDER — OXYCODONE HYDROCHLORIDE 5 MG/1
5 TABLET ORAL
Status: DISCONTINUED | OUTPATIENT
Start: 2022-04-27 | End: 2022-04-28 | Stop reason: HOSPADM

## 2022-04-27 RX ORDER — CEFAZOLIN SODIUM 1 G/3ML
2 INJECTION, POWDER, FOR SOLUTION INTRAMUSCULAR; INTRAVENOUS ONCE
Status: COMPLETED | OUTPATIENT
Start: 2022-04-27 | End: 2022-04-27

## 2022-04-27 RX ORDER — MIDAZOLAM HYDROCHLORIDE 1 MG/ML
INJECTION INTRAMUSCULAR; INTRAVENOUS PRN
Status: DISCONTINUED | OUTPATIENT
Start: 2022-04-27 | End: 2022-04-27 | Stop reason: SURG

## 2022-04-27 RX ORDER — HYDROMORPHONE HYDROCHLORIDE 1 MG/ML
0.5 INJECTION, SOLUTION INTRAMUSCULAR; INTRAVENOUS; SUBCUTANEOUS
Status: DISCONTINUED | OUTPATIENT
Start: 2022-04-27 | End: 2022-04-28 | Stop reason: HOSPADM

## 2022-04-27 RX ORDER — HALOPERIDOL 5 MG/ML
1 INJECTION INTRAMUSCULAR EVERY 6 HOURS PRN
Status: DISCONTINUED | OUTPATIENT
Start: 2022-04-27 | End: 2022-04-28 | Stop reason: HOSPADM

## 2022-04-27 RX ORDER — ONDANSETRON 2 MG/ML
INJECTION INTRAMUSCULAR; INTRAVENOUS PRN
Status: DISCONTINUED | OUTPATIENT
Start: 2022-04-27 | End: 2022-04-27 | Stop reason: SURG

## 2022-04-27 RX ORDER — SPIRONOLACTONE 25 MG/1
25 TABLET ORAL DAILY
Status: DISCONTINUED | OUTPATIENT
Start: 2022-04-27 | End: 2022-04-28 | Stop reason: HOSPADM

## 2022-04-27 RX ORDER — ACETAMINOPHEN 500 MG
1000 TABLET ORAL ONCE
Status: COMPLETED | OUTPATIENT
Start: 2022-04-27 | End: 2022-04-27

## 2022-04-27 RX ORDER — OXYCODONE HYDROCHLORIDE 5 MG/1
5 TABLET ORAL EVERY 4 HOURS PRN
Qty: 42 TABLET | Refills: 0 | Status: SHIPPED | OUTPATIENT
Start: 2022-04-27 | End: 2022-05-04

## 2022-04-27 RX ORDER — CELECOXIB 200 MG/1
200 CAPSULE ORAL ONCE
Status: COMPLETED | OUTPATIENT
Start: 2022-04-27 | End: 2022-04-27

## 2022-04-27 RX ORDER — ACETAMINOPHEN 500 MG
1000 TABLET ORAL EVERY 6 HOURS
Status: DISCONTINUED | OUTPATIENT
Start: 2022-04-27 | End: 2022-04-28 | Stop reason: HOSPADM

## 2022-04-27 RX ORDER — BISACODYL 10 MG
10 SUPPOSITORY, RECTAL RECTAL
Status: DISCONTINUED | OUTPATIENT
Start: 2022-04-27 | End: 2022-04-28 | Stop reason: HOSPADM

## 2022-04-27 RX ORDER — ACETAMINOPHEN 500 MG
1000 TABLET ORAL EVERY 6 HOURS PRN
Status: DISCONTINUED | OUTPATIENT
Start: 2022-05-02 | End: 2022-04-28 | Stop reason: HOSPADM

## 2022-04-27 RX ORDER — ONDANSETRON 2 MG/ML
4 INJECTION INTRAMUSCULAR; INTRAVENOUS EVERY 4 HOURS PRN
Status: DISCONTINUED | OUTPATIENT
Start: 2022-04-27 | End: 2022-04-28 | Stop reason: HOSPADM

## 2022-04-27 RX ORDER — OMEPRAZOLE 20 MG/1
20 CAPSULE, DELAYED RELEASE ORAL DAILY
Status: DISCONTINUED | OUTPATIENT
Start: 2022-04-27 | End: 2022-04-28 | Stop reason: HOSPADM

## 2022-04-27 RX ORDER — DEXAMETHASONE SODIUM PHOSPHATE 4 MG/ML
INJECTION, SOLUTION INTRA-ARTICULAR; INTRALESIONAL; INTRAMUSCULAR; INTRAVENOUS; SOFT TISSUE PRN
Status: DISCONTINUED | OUTPATIENT
Start: 2022-04-27 | End: 2022-04-27 | Stop reason: SURG

## 2022-04-27 RX ORDER — BENAZEPRIL HYDROCHLORIDE 10 MG/1
40 TABLET ORAL
Status: DISCONTINUED | OUTPATIENT
Start: 2022-04-27 | End: 2022-04-28 | Stop reason: HOSPADM

## 2022-04-27 RX ORDER — SODIUM CHLORIDE 9 MG/ML
INJECTION, SOLUTION INTRAMUSCULAR; INTRAVENOUS; SUBCUTANEOUS
Status: DISCONTINUED | OUTPATIENT
Start: 2022-04-27 | End: 2022-04-27 | Stop reason: HOSPADM

## 2022-04-27 RX ORDER — BUPIVACAINE HYDROCHLORIDE 5 MG/ML
INJECTION, SOLUTION EPIDURAL; INTRACAUDAL
Status: DISCONTINUED | OUTPATIENT
Start: 2022-04-27 | End: 2022-04-27 | Stop reason: HOSPADM

## 2022-04-27 RX ORDER — SODIUM CHLORIDE, SODIUM LACTATE, POTASSIUM CHLORIDE, CALCIUM CHLORIDE 600; 310; 30; 20 MG/100ML; MG/100ML; MG/100ML; MG/100ML
INJECTION, SOLUTION INTRAVENOUS CONTINUOUS
Status: ACTIVE | OUTPATIENT
Start: 2022-04-27 | End: 2022-04-27

## 2022-04-27 RX ORDER — AMOXICILLIN 250 MG
1 CAPSULE ORAL NIGHTLY
Status: DISCONTINUED | OUTPATIENT
Start: 2022-04-27 | End: 2022-04-28 | Stop reason: HOSPADM

## 2022-04-27 RX ORDER — BUPIVACAINE HYDROCHLORIDE AND EPINEPHRINE 2.5; 5 MG/ML; UG/ML
INJECTION, SOLUTION EPIDURAL; INFILTRATION; INTRACAUDAL; PERINEURAL PRN
Status: DISCONTINUED | OUTPATIENT
Start: 2022-04-27 | End: 2022-04-27 | Stop reason: SURG

## 2022-04-27 RX ORDER — OXYCODONE HCL 5 MG/5 ML
10 SOLUTION, ORAL ORAL
Status: COMPLETED | OUTPATIENT
Start: 2022-04-27 | End: 2022-04-27

## 2022-04-27 RX ORDER — ONDANSETRON 2 MG/ML
4 INJECTION INTRAMUSCULAR; INTRAVENOUS
Status: DISCONTINUED | OUTPATIENT
Start: 2022-04-27 | End: 2022-04-27 | Stop reason: HOSPADM

## 2022-04-27 RX ORDER — POLYETHYLENE GLYCOL 3350 17 G/17G
17 POWDER, FOR SOLUTION ORAL DAILY
Qty: 20 EACH | Refills: 3 | Status: SHIPPED | OUTPATIENT
Start: 2022-04-27 | End: 2023-12-06

## 2022-04-27 RX ADMIN — ACETAMINOPHEN 1000 MG: 500 TABLET, FILM COATED ORAL at 12:47

## 2022-04-27 RX ADMIN — ONDANSETRON 4 MG: 2 INJECTION INTRAMUSCULAR; INTRAVENOUS at 10:23

## 2022-04-27 RX ADMIN — BENAZEPRIL HYDROCHLORIDE 40 MG: 10 TABLET, COATED ORAL at 20:30

## 2022-04-27 RX ADMIN — DOCUSATE SODIUM 100 MG: 100 CAPSULE, LIQUID FILLED ORAL at 17:14

## 2022-04-27 RX ADMIN — ACETAMINOPHEN 1000 MG: 500 TABLET, FILM COATED ORAL at 08:11

## 2022-04-27 RX ADMIN — MIDAZOLAM HYDROCHLORIDE 1 MG: 1 INJECTION, SOLUTION INTRAMUSCULAR; INTRAVENOUS at 08:52

## 2022-04-27 RX ADMIN — HYDROMORPHONE HYDROCHLORIDE 0.4 MG: 1 INJECTION, SOLUTION INTRAMUSCULAR; INTRAVENOUS; SUBCUTANEOUS at 10:47

## 2022-04-27 RX ADMIN — CELECOXIB 200 MG: 200 CAPSULE ORAL at 08:11

## 2022-04-27 RX ADMIN — HYDROMORPHONE HYDROCHLORIDE 0.4 MG: 1 INJECTION, SOLUTION INTRAMUSCULAR; INTRAVENOUS; SUBCUTANEOUS at 11:57

## 2022-04-27 RX ADMIN — ACETAMINOPHEN 1000 MG: 500 TABLET, FILM COATED ORAL at 17:13

## 2022-04-27 RX ADMIN — SENNOSIDES AND DOCUSATE SODIUM 1 TABLET: 50; 8.6 TABLET ORAL at 20:30

## 2022-04-27 RX ADMIN — BUPIVACAINE HYDROCHLORIDE AND EPINEPHRINE 20 ML: 2.5; 5 INJECTION, SOLUTION EPIDURAL; INFILTRATION; INTRACAUDAL; PERINEURAL at 08:52

## 2022-04-27 RX ADMIN — SODIUM CHLORIDE, POTASSIUM CHLORIDE, SODIUM LACTATE AND CALCIUM CHLORIDE: 600; 310; 30; 20 INJECTION, SOLUTION INTRAVENOUS at 10:07

## 2022-04-27 RX ADMIN — DEXAMETHASONE SODIUM PHOSPHATE 4 MG: 4 INJECTION, SOLUTION INTRAMUSCULAR; INTRAVENOUS at 10:23

## 2022-04-27 RX ADMIN — HYDROMORPHONE HYDROCHLORIDE 0.2 MG: 1 INJECTION, SOLUTION INTRAMUSCULAR; INTRAVENOUS; SUBCUTANEOUS at 11:08

## 2022-04-27 RX ADMIN — ACETAMINOPHEN 1000 MG: 500 TABLET, FILM COATED ORAL at 23:25

## 2022-04-27 RX ADMIN — ASPIRIN 81 MG: 81 TABLET, COATED ORAL at 22:42

## 2022-04-27 RX ADMIN — Medication 200 MCG: at 10:14

## 2022-04-27 RX ADMIN — SODIUM CHLORIDE, POTASSIUM CHLORIDE, SODIUM LACTATE AND CALCIUM CHLORIDE: 600; 310; 30; 20 INJECTION, SOLUTION INTRAVENOUS at 09:05

## 2022-04-27 RX ADMIN — SODIUM CHLORIDE 2 G: 9 INJECTION, SOLUTION INTRAVENOUS at 12:47

## 2022-04-27 RX ADMIN — FENTANYL CITRATE 50 MCG: 50 INJECTION, SOLUTION INTRAMUSCULAR; INTRAVENOUS at 08:52

## 2022-04-27 RX ADMIN — OXYCODONE HYDROCHLORIDE 5 MG: 5 TABLET ORAL at 20:30

## 2022-04-27 RX ADMIN — HYDROMORPHONE HYDROCHLORIDE 0.4 MG: 1 INJECTION, SOLUTION INTRAMUSCULAR; INTRAVENOUS; SUBCUTANEOUS at 11:47

## 2022-04-27 RX ADMIN — DOXAZOSIN 2 MG: 2 TABLET ORAL at 20:30

## 2022-04-27 RX ADMIN — TRANEXAMIC ACID 1000 MG: 100 INJECTION, SOLUTION INTRAVENOUS at 09:17

## 2022-04-27 RX ADMIN — OXYCODONE HYDROCHLORIDE 10 MG: 5 SOLUTION ORAL at 11:26

## 2022-04-27 RX ADMIN — ROCURONIUM BROMIDE 30 MG: 10 INJECTION, SOLUTION INTRAVENOUS at 09:08

## 2022-04-27 RX ADMIN — CEFAZOLIN 2 G: 330 INJECTION, POWDER, FOR SOLUTION INTRAMUSCULAR; INTRAVENOUS at 09:08

## 2022-04-27 RX ADMIN — FENTANYL CITRATE 50 MCG: 50 INJECTION, SOLUTION INTRAMUSCULAR; INTRAVENOUS at 09:08

## 2022-04-27 RX ADMIN — OXYCODONE HYDROCHLORIDE 10 MG: 10 TABLET ORAL at 23:25

## 2022-04-27 RX ADMIN — PROPOFOL 150 MG: 10 INJECTION, EMULSION INTRAVENOUS at 09:08

## 2022-04-27 RX ADMIN — HYDROMORPHONE HYDROCHLORIDE 0.4 MG: 1 INJECTION, SOLUTION INTRAMUSCULAR; INTRAVENOUS; SUBCUTANEOUS at 11:00

## 2022-04-27 RX ADMIN — FENTANYL CITRATE 100 MCG: 50 INJECTION, SOLUTION INTRAMUSCULAR; INTRAVENOUS at 09:32

## 2022-04-27 ASSESSMENT — COGNITIVE AND FUNCTIONAL STATUS - GENERAL
WALKING IN HOSPITAL ROOM: A LITTLE
HELP NEEDED FOR BATHING: A LITTLE
DAILY ACTIVITIY SCORE: 21
SUGGESTED CMS G CODE MODIFIER MOBILITY: CK
MOVING TO AND FROM BED TO CHAIR: A LITTLE
TOILETING: A LITTLE
MOVING FROM LYING ON BACK TO SITTING ON SIDE OF FLAT BED: A LITTLE
SUGGESTED CMS G CODE MODIFIER DAILY ACTIVITY: CJ
MOBILITY SCORE: 19
DRESSING REGULAR LOWER BODY CLOTHING: A LITTLE
CLIMB 3 TO 5 STEPS WITH RAILING: A LITTLE
STANDING UP FROM CHAIR USING ARMS: A LITTLE

## 2022-04-27 ASSESSMENT — LIFESTYLE VARIABLES
EVER HAD A DRINK FIRST THING IN THE MORNING TO STEADY YOUR NERVES TO GET RID OF A HANGOVER: NO
HAVE PEOPLE ANNOYED YOU BY CRITICIZING YOUR DRINKING: NO
TOTAL SCORE: 0
AVERAGE NUMBER OF DAYS PER WEEK YOU HAVE A DRINK CONTAINING ALCOHOL: 3
HOW MANY TIMES IN THE PAST YEAR HAVE YOU HAD 5 OR MORE DRINKS IN A DAY: 0
CONSUMPTION TOTAL: NEGATIVE
TOTAL SCORE: 0
ALCOHOL_USE: YES
ON A TYPICAL DAY WHEN YOU DRINK ALCOHOL HOW MANY DRINKS DO YOU HAVE: 1
TOTAL SCORE: 0
HAVE YOU EVER FELT YOU SHOULD CUT DOWN ON YOUR DRINKING: NO
EVER FELT BAD OR GUILTY ABOUT YOUR DRINKING: NO

## 2022-04-27 ASSESSMENT — PATIENT HEALTH QUESTIONNAIRE - PHQ9
2. FEELING DOWN, DEPRESSED, IRRITABLE, OR HOPELESS: NOT AT ALL
1. LITTLE INTEREST OR PLEASURE IN DOING THINGS: NOT AT ALL
SUM OF ALL RESPONSES TO PHQ9 QUESTIONS 1 AND 2: 0

## 2022-04-27 ASSESSMENT — PAIN DESCRIPTION - PAIN TYPE
TYPE: SURGICAL PAIN
TYPE: CHRONIC PAIN
TYPE: SURGICAL PAIN

## 2022-04-27 ASSESSMENT — FIBROSIS 4 INDEX: FIB4 SCORE: 1.26

## 2022-04-27 ASSESSMENT — PAIN SCALES - GENERAL: PAIN_LEVEL: 4

## 2022-04-27 NOTE — ANESTHESIA PROCEDURE NOTES
Peripheral Block    Date/Time: 4/27/2022 8:52 AM  Performed by: Nando Arce M.D.  Authorized by: Nando Arce M.D.     Start Time:  4/27/2022 8:52 AM  Reason for Block: at surgeon's request and post-op pain management ONLY    patient identified, IV checked, site marked, risks and benefits discussed, surgical consent, monitors and equipment checked, pre-op evaluation and timeout performed    Patient Position:  Supine  Prep: ChloraPrep    Monitoring:  Heart rate, continuous pulse ox and cardiac monitor  Block Region:  Lower Extremity  Lower Extremity - Block Type:  Selective FEMORAL nerve block at the Adductor Canal    Laterality:  Right  Procedures: ultrasound guided  Image captured, interpreted and electronically stored.  Local Infiltration:  Lidocaine  Strength:  1 %  Dose:  3 ml  Block Type:  Single-shot  Needle Localization:  Ultrasound guidance  Injection Assessment:  Negative aspiration for heme, no paresthesia on injection, incremental injection and local visualized surrounding nerve on ultrasound

## 2022-04-27 NOTE — DISCHARGE PLANNING
Anticipated Discharge Disposition: Home with DME walker    Action: LSW notified that pt will need a walker for home. Pt has walker order. Pt has insurance that contracts with [employer] (Medicare). LSW faxed choice form for [facility] to [facility] for continuity of care. LSW notified RN to get walker from storage.    Barriers to Discharge: None    Plan: LSW to follow and assist as needed.

## 2022-04-27 NOTE — CARE PLAN
The patient is Stable - Low risk of patient condition declining or worsening    Shift Goals  Clinical Goals: pt will void and ambulate 50 ft within 6 hours postop, pain will be tolerable at lesat at 7/10 this shift  Patient Goals: pain tolerable at 7/10, ambulate, rest    Progress made toward(s) clinical / shift goals:  Pt still planning to void and ambulate within 6 hours postop. Pt states pain is tolerable at this time. Progressing on other goals     Patient is not progressing towards the following goals:

## 2022-04-27 NOTE — ANESTHESIA PREPROCEDURE EVALUATION
Case: [id number] Date/Time: 04/27/22 0800    Procedure: ARTHROPLASTY, KNEE, TOTAL (Right )    Diagnosis: Osteoarthritis of knee, unilateral [M17.10]    Pre-op diagnosis: Osteoarthritis of knee, unilateral [M17.10]    Location:  OR  / SURGERY [facility]    Surgeons: Pedro Ibanez M.D.          Relevant Problems   NEURO   (positive) S/P ablation of atrial fibrillation      CARDIAC   (positive) PAF (paroxysmal atrial fibrillation) ([facility])   (positive) Primary hypertension      ENDO   (positive) Hypothyroidism, unspecified type       Physical Exam    Airway   Mallampati: II  TM distance: >3 FB  Neck ROM: full       Cardiovascular - normal exam  Rhythm: regular  Rate: normal  (-) murmur     Dental - normal exam           Pulmonary - normal exam  Breath sounds clear to auscultation     Abdominal    Neurological - normal exam                 Anesthesia Plan    ASA 2       Plan - general       Airway plan will be ETT        Plan Factors:   Patient was previously instructed to abstain from smoking on day of procedure.  Patient did not smoke on day of procedure.      Induction: intravenous    Postoperative Plan: Postoperative administration of opioids is intended.    Pertinent diagnostic labs and testing reviewed    Informed Consent:    Anesthetic plan and risks discussed with patient.    Use of blood products discussed with: patient whom consented to blood products.

## 2022-04-27 NOTE — H&P
Surgery Orthopedic History & Physical Note    Date  4/27/2022    Primary Care Physician  Zelalem Vo M.D.      Pre-Op Diagnosis Codes:     * Osteoarthritis of knee, unilateral [M17.10]    HPI  This is a 76 y.o. female who presents for scheduled right total knee arthroplasty for osteoarthritis with Dr. Ibanez today. She was seen pre-operatively in clinic and indicated for surgery with Dr. Ibanez. Please see the clinic note below:      The patient is a 76-year-old woman who complains of arthritic right knee pain but also has had symptoms of lateral right hip pain and to a lesser extent left knee pain.  She has been treated for inflammatory arthritis with prednisone 1 mg a day but is no longer on this medication.  She is also had a cortisone shot to the right trochanteric bursa which helped her lateral hip pain but symptoms have recurred.  However she continued complains of right knee pain more so than hip pain which limits her activity.  She has difficulty carrying out routine activities now as result of arthritic right knee pain.     On exam there is no groin pain with motion of either hip including flexion and internal rotation to tenderness is present over the proximal IT band and greater trochanter right hip.  Right knee motion is 0 to 105 degrees with tenderness over the medial joint line and crepitus during motion.  X-rays demonstrate arthritic changes right knee with irregularity of the joint space and narrowing and hip x-rays demonstrate mild arthritic changes.  Impression right hip IT band tenderness pain and trochanteric bursitis with early osteoarthritis and more significant degenerative arthritis of the right knee which impairs walking ability and function despite appropriate conservative therapy     .     I recommended treatment including consideration to knee replacement which I did consider very reasonable and appropriate.  I discussed the factors contributing to osteoarthritis of the knee in  relationship of hip and knee symptoms with the patient but since her main complaint appears more related to osteoarthritis of the right knee and consider knee replacement appropriate and that her hip symptoms are more related to bursitis and IT band tendinitis rather than osteoarthritis so that hip replacement may not be necessary at least at this point and she has good range of motion of the hip which likely would not limit her participation in physical therapy after knee replacement.  I also discussed continued nonoperative treatment as an alternative for arthritis of the knee and hip bursitis but she feels that the knee symptoms significantly impair her walking ability and function at this point feels she can no longer tolerate the level of pain and impairment and would like to proceed with right total knee arthroplasty for which I recommend [place] [facility] components.  She was also advised to utilize topical anti-inflammatories, Voltaren gel to the right hip patient is currently doing and local massage and therapy and have advised her that she may require additional IT band stretching and treatment including cortisone injections after surgery pending her recovery and outcome after right total knee arthroplasty.    Past Medical History:   Diagnosis Date   • Arthritis     Polymyalgia Rheumatica; off prednisone since 2/7/22   • Benign essential HTN 5/29/2012   • DDD (degenerative disc disease), lumbar    • Heart burn    • High cholesterol    • Hyperlipidemia 5/29/2012   • PAF (paroxysmal atrial fibrillation) ([facility]) 5/29/2012    Ablations x 2   • Personal history of prior ablation treatment 5/29/2012   • PONV (postoperative nausea and vomiting)        History reviewed. No pertinent surgical history.    Current Facility-Administered Medications   Medication Dose Route Frequency Provider Last Rate Last Admin   • lidocaine (XYLOCAINE) 1 % injection 0.5 mL  0.5 mL Intradermal Once PRN Pedro Ibanez M.D.       • lactated  ringers infusion   Intravenous Continuous Pedro Ibanez M.D.       • ceFAZolin (ANCEF) injection 2 g  2 g Intravenous Once Liam Weller M.D.       • Tranexamic Acid (CYKLOKAPRON) injection 1,000 mg  1,000 mg Intravenous Once Liam Weller M.D.           Social History     Socioeconomic History   • Marital status:      Spouse name: Not on file   • Number of children: Not on file   • Years of education: Not on file   • Highest education level: Not on file   Occupational History   • Not on file   Tobacco Use   • Smoking status: Never Smoker   • Smokeless tobacco: Never Used   Vaping Use   • Vaping Use: Never used   Substance and Sexual Activity   • Alcohol use: Yes     Alcohol/week: 1.8 oz     Types: 3 Glasses of wine per week   • Drug use: Never   • Sexual activity: Not on file   Other Topics Concern   • Not on file   Social History Narrative   • Not on file     Social Determinants of Health     Financial Resource Strain: Not on file   Food Insecurity: Not on file   Transportation Needs: Not on file   Physical Activity: Not on file   Stress: Not on file   Social Connections: Not on file   Intimate Partner Violence: Not on file   Housing Stability: Not on file       History reviewed. No pertinent family history.    Allergies  Crestor [rosuvastatin calcium], Flagyl [metronidazole], Hydrochlorothiazide w-triamterene, and Rosuvastatin    Review of Systems  Negative    Physical Exam    Vital Signs  Blood Pressure : 136/68   Temperature: 36.5 °C (97.7 °F)   Pulse: 83   Respiration: 18   Pulse Oximetry: 97 %       Labs:                    Radiology:  No orders to display         Assessment/Plan:  Pre-Op Diagnosis Codes:     * Osteoarthritis of knee, unilateral [M17.10]  Procedure(s):  ARTHROPLASTY, KNEE, TOTAL

## 2022-04-27 NOTE — PROGRESS NOTES
4 Eyes Skin Assessment Completed by RADHA Mayes and RADHA Abarca.    Head WDL  Ears WDL  Nose WDL  Mouth WDL  Neck WDL  Breast/Chest WDL  Shoulder Blades WDL  Spine WDL  (R) Arm/Elbow/Hand WDL  (L) Arm/Elbow/Hand WDL  Abdomen WDL  Groin WDL  Scrotum/Coccyx/Buttocks WDL  (R) Leg Incision  (L) Leg WDL  (R) Heel/Foot/Toe WDL  (L) Heel/Foot/Toe WDL          Devices In Places Blood Pressure Cuff and Pulse Ox      Interventions In Place Pillows and Pressure Redistribution Mattress    Possible Skin Injury No    Pictures Uploaded Into Epic N/A  Wound Consult Placed N/A  RN Wound Prevention Protocol Ordered No

## 2022-04-27 NOTE — OP REPORT
DATE OF SERVICE:  04/27/2022     PREOPERATIVE DIAGNOSIS:  Osteoarthritis, right knee.     POSTOPERATIVE DIAGNOSIS:  Osteoarthritis, right knee.     PROCEDURE:  Right total knee arthroplasty.     SURGEON:  Pedro Ibanez MD     ASSISTANT:  Liam Weller MD     ANESTHESIA:  General.     COMPLICATIONS:  None.     INDICATIONS:  The patient had developed a progressively worsening arthritic   right knee pain and associated varus flexion deformity, which impaired walking   ability and function despite appropriate conservative therapy, and was   admitted for elective right total knee arthroplasty.     DESCRIPTION OF PROCEDURE:  After induction of satisfactory anesthesia, the   patient was positioned supine on the operating room table and a tourniquet   applied to proximal right thigh.  Intravenous antibiotics were administered   and the right lower extremity prepped and draped in the usual sterile fashion.     After the usual prep and drape, the limb was exsanguinated with an Esmarch and   tourniquet inflated to 250 mmHg.  An anterior incision just slightly lateral   to the midline was made from the proximal pole of the patella to the level of   the tibial tubercle and the incision carried down through skin and   subcutaneous tissue to the level of the extensor mechanism where full   thickness skin and subcutaneous tissue flaps were raised medially and   laterally, and a medial parapatellar arthrotomy performed.  The proximal   medial soft tissue sleeve was elevated in a subperiosteal fashion to the mid   coronal plane and the patella subluxed laterally.  Examination of the knee   demonstrated tricompartmental arthritic changes with complete loss of   articular cartilage and exposed subchondral bone along the medial femoral   condyle and medial tibial plateau with areas of full-thickness cartilage loss   in the patellofemoral joint and lateral tibial femoral compartment.    Osteophytes were debrided and an  intramedullary hole made in distal femur for   the intramedullary alignment rosi, which was set at 5 degrees of valgus for the   distal femoral resection, which was made at the primary cut level.  The   cutting block was pinned in place to make the femoral resection and the   resected bone surfaces removed after cutting them with the oscillating saw.    The distal femur was sized for the Journey system for a size 5 femoral   component, which fit well and the AP cutting block parallel to the epicondylar   and perpendicular to the AP axis, which was 3 degrees of external rotation   relative to the posterior condylar line for a size 5 femoral component.  The   anterior, posterior, and chamfer bone cuts were made and resected bone   surfaces removed.  Meniscal remnants were debrided, taking care to preserve   the integrity of the collateral ligaments and the tibia was subluxed   anteriorly.  Extramedullary alignment was used to prepare the tibial cut just   below the level of the medial defect for the primary cut level.  Cutting block   was pinned in place using mechanical guides in neutral varus valgus and   approximately 3 degrees of posterior slope based on the patient's anatomy.    The cutting block was pinned in place and the tibial resection made with an   oscillating saw and resected bone surface removed, taking care to preserve the   integrity of the collateral ligaments.  The posterior compartments were   debrided and meniscal remnants and osteophytes by distracting each compartment   and debriding the opposite side.  The tibia was then sized for a Journey size   3 baseplate, which fit well and the trial was placed with the center oriented   along the medial third of the tibial tubercle and pinned in place.  The   femoral trial, Journey size 5 was then placed in the posterior stabilized box   housing prepared through the femoral trial followed by introduction of the   modular Cam and trial tibial insert, which  provided good overall limb   alignment with full extension of the knee and excellent stability and soft   tissue balance to varus and valgus and AP stress and extension, mid flexion,   and full flexion and throughout the arc of motion 0-135 degrees.  The patella   was prepared for implantation of a resurfacing component by measuring the   patellar thickness with a caliper and resecting the articular surface and   underlying bone to a flat contour to maintain an anatomic composite height   with the patellar implant.  The resection was made with an oscillating saw to   a flat contour and the patella sized for a 32 mm oval resurfacing component,   which fit well.  A lateral facetectomy was performed of the lateral   osteophytes and the lug holes prepared through the trial 32 oval component   drill guide.  The trial component was placed, which tracked centrally   throughout the arc of motion.  The tibial fin holes were cut through the   tibial trial after removing the femoral trial and tibial insert and trial   components were removed.  A final check of the posterior compartment was   performed and a posterior release of the posterior capsule performed off the   femur to ensure full extension of the knee.  Knee joint surfaces were   thoroughly irrigated and dried.  Cement was mixed and applied to the exposed   tibial bone surface and underside of the Journey size 3 baseplate, which was   then pressed into place and fully impacted into position.  Excess cement was   removed from the edges of the tibial component.  The Oxinium femoral   component, Journey BCS 2, size 5 was then cemented in place and fully seated   and the knee brought into extension with a trial insert.  The 32 mm oval   resurfacing patellar component was cemented in place and held with a clamp and   excess cement removed.  The knee was irrigated with dilute Betadine followed   by pulsatile saline lavage and after the cement had fully set, the tourniquet    was released and adequate hemostasis maintained with electrocautery.  A 9 mm   Journey BCS 2 insert was selected and exchanged for the trial insert and   locked into the tibial baseplate locking mechanism.  This was checked manually   and visually to ensure that the liner locking mechanism had been properly and   fully engaged.  With the final implants in place, good overall limb alignment   was confirmed from hip to ankle as well as motion 0-135 degrees with central   patellar tracking throughout the arc of motion and excellent stability and   soft tissue balance to varus and valgus and AP stress and extension, mid   flexion, and full flexion and throughout the arc of motion.  The wound was   irrigated with pulsatile lavage and the medial retinacular arthrotomy closed   with a running #1 Quill suture followed by fully flexing the knee to ensure   the integrity of the retinacular repair.  The subcutaneous layer was closed   with interrupted 2-0 Monocryl sutures and skin closed with a running   subcuticular 3-0 Monocryl suture, application of skin glue and a sterile   dressing.  The patient tolerated the procedure well and returned to recovery   room in stable condition.        ______________________________  MD SINGH Shi/SHANNAN/TIMUR    DD:  04/27/2022 10:39  DT:  04/27/2022 11:13    Job#:  [id number]

## 2022-04-27 NOTE — PROGRESS NOTES
Received report from PACU, pt arrived on unit. AAOx4, VSS. Pt states pain 8/10, denies intervention at this time. Denies nausea, SOB, or numbness/tingling. Dressing to R knee CDI with polar ice in place. CMS intact to RLE. Updated pt and family on plan of care for the day. Safety precautions in place, pt educated to call for assistance.

## 2022-04-27 NOTE — DISCHARGE PLANNING
Received Choice form at [phone]  Agency/Facility Name: [facility]  Referral sent per Choice form @ [phone]

## 2022-04-27 NOTE — ANESTHESIA POSTPROCEDURE EVALUATION
Patient: Chacha Fraire    Procedure Summary     Date: 04/27/22 Room / Location:  OR  / SURGERY [facility]    Anesthesia Start: 0905 Anesthesia Stop: 1043    Procedure: ARTHROPLASTY, KNEE, TOTAL (Right ) Diagnosis:       Osteoarthritis of knee, unilateral      (Osteoarthritis of knee, unilateral [M17.10])    Surgeons: Pedro Ibanez M.D. Responsible Provider: Nando Arce M.D.    Anesthesia Type: general ASA Status: 2          Final Anesthesia Type: general  Last vitals  BP   Blood Pressure : 136/68    Temp   36.5 °C (97.7 °F)    Pulse   83   Resp   18    SpO2   97 %      Anesthesia Post Evaluation    Patient location during evaluation: PACU  Patient participation: complete - patient participated  Level of consciousness: awake and alert  Pain score: 4    Airway patency: patent  Anesthetic complications: no  Cardiovascular status: hemodynamically stable  Respiratory status: acceptable  Hydration status: euvolemic    PONV: none          No complications documented.     Nurse Pain Score: 5 (NPRS)

## 2022-04-27 NOTE — ANESTHESIA TIME REPORT
Anesthesia Start and Stop Event Times     Date Time Event    4/27/2022 0844 Ready for Procedure     0905 Anesthesia Start     1043 Anesthesia Stop        Responsible Staff  04/27/22    Name Role Begin End    Nando Arce M.D. Anesth 0905 1043        Overtime Reason:  no overtime (within assigned shift)    Comments:

## 2022-04-27 NOTE — ANESTHESIA PROCEDURE NOTES
Airway    Date/Time: 4/27/2022 9:09 AM  Performed by: Nando Arce M.D.  Authorized by: Nando Arce M.D.     Location:  OR  Urgency:  Elective  Indications for Airway Management:  Anesthesia      Spontaneous Ventilation: absent    Sedation Level:  Deep  Preoxygenated: Yes    Patient Position:  Sniffing  Final Airway Type:  Endotracheal airway  Final Endotracheal Airway:  ETT  Cuffed: Yes    Technique Used for Successful ETT Placement:  Direct laryngoscopy    Insertion Site:  Oral  Blade Type:  Crystal  Laryngoscope Blade/Videolaryngoscope Blade Size:  3  ETT Size (mm):  7.0  Measured from:  Teeth  ETT to Teeth (cm):  22  Placement Verified by: auscultation and capnometry    Cormack-Lehane Classification:  Grade I - full view of glottis  Number of Attempts at Approach:  1

## 2022-04-27 NOTE — LETTER
March 15, 2022    Patient Name: Chacha Fraire  Surgeon Name: Pedro Ibanez M.D.  Surgery Facility: [facility] ([address] [facility])  Surgery Date: 4/27/2022    The time of your surgery is not final and may change up to and until the day of your surgery. You will be contacted 24-48 hours prior to your surgery date with your check-in and surgery time.    If you will not be at one of the below numbers please call/text the surgery scheduler at [phone]  Preferred Phone: [phone]    BEFORE YOUR SURGERY  Pre Registration and/or Lab Work must be done within and no earlier than 28 days prior to your surgery date. Please call [facility] at [phone] for an appointment as soon as possible.     Not scheduled at [facility] contact the scheduled facility for approved testing facilities.    Please refrain from smoking any substance after midnight prior to surgery. Smoking may interfere with the anesthetic and frequently produces nausea during the recovery period.    Continue taking all lifesaving medications. Including the morning of your surgery with small sip of water.    Please read the MEDICATION INSTRUCTIONS below completely.    DAY OF YOUR SURGERY  Nothing to eat or drink after midnight     Please arrive at the hospital/surgery center at the check-in time provided.     An adult will need to bring you and take you home after your surgery.     AFTER YOUR SURGERY  Post op Appointment:   Date: 05/09/2022   Time: 2:45 PM    With: Pedro Ibanez M.D.   Location: [address] [place] [id number]    - Therapy- Your first appointment should be 1  week(s) after your surgery. For your convenience we have 4 Physical Therapy locations: [place], [facility], [place], and [facility]. Call our office ASAP to schedule an appointment at [phone] or take the enclosed Therapy Prescription to a facility of your choice.    TIME OFF  WORK  FMLA or Disability forms can be faxed directly to: [phone] or you may drop them off at [address] [place] [id number]. Our office charges a $35.00 fee per form. Forms will be completed within 10 business days of drop off and payment received. For the status of your forms you may contact our disability office directly [phone].    MEDICATION INSTRUCTIONS  The following medications should be stopped a minimum of 10 days prior to surgery:  All over the counter, Supplements & Herbal medications    Anorectics: Phentermine (Adipex-P, Lomaira and Suprenza), Phentermine-topiramate (Qsymia), Bupropion-naltrexone (Contrave)    Opiod Partial Agonists/Opioid Antagonists: Buprenorphine (Subocone, Belbuca, Butrans, Probuphine Implant, Sublocade), Naltrexone (ReVia, Vivitrol), Naloxone    Amphetamines: Dextroamphetamine/Amphetamine (Adderall, Mydayis), Methylphenidate Hydrochloride (Concerta, Metadate, Methylin, Ritalin)    The following medications should be stopped 5 days prior to surgery:  Blood Thinners: Any Aspirin, Aspirin products, anti-inflammatories such as ibuprofen and any blood thinners such as Coumadin and Plavix. Please consult your prescribing physician if you are on life saving blood thinners, in regards to when to stop medications prior to surgery.     The following medications should be stopped a minimum of 3 days prior to surgery:  PDE-5 inhibitors: Sildenafil (Viagra), Tadalafil (Cialis), Vardenafil (Levitra), Avanafil (Stendra)    MAO Inhibitors: Rasagiline (Azilect), Selegiline (Eldepryl, Emsam, Selapar), Isocarboxazid (Marplan), Phenelzine (Nardil)

## 2022-04-27 NOTE — OR SURGEON
Immediate Post OP Note    PreOp Diagnosis: Right knee OA    PostOp Diagnosis: Same      Procedure(s):  ARTHROPLASTY, KNEE, TOTAL    Surgeon(s):  KOLE Major M.D.    Anesthesiologist/Type of Anesthesia:  Anesthesiologist: Nando Arce M.D./* No anesthesia type entered *    Surgical Staff:  Circulator: Yarelis Baires R.N.  Limb Abad: Ricky Ramon  Relief Circulator: Agnes Amaya R.N.  Scrub Person: Erwin Francisco    Specimens removed if any:  * No specimens in log *    Estimated Blood Loss: 100cc    Findings: Right knee OA    Complications: None        4/27/2022 10:38 AM Liam Weller M.D.

## 2022-04-27 NOTE — PROGRESS NOTES
Patient has CONSENTED to participate in the [facility] Remote Patient Monitoring proof of concept program.

## 2022-04-27 NOTE — OR NURSING
1040  To PACU from OR via bed, respirations spontaneous and non-laboredIcepack applied over c/d/i right knee surgical dressings.  Pt restless, c/o severe pain, anesthesia at bedside-states fentanyl given in OR, give dilaudid    1047  dilaudid given for c/o pain    1100 medicated for continued c/o pain    1108 medicated for continued pain    1114 radiology here for post op xray  o2 changed to oxymask @ 10L  Per ERAS orders    1126 oral medications given for c/o pain    1135 called family with update    1147 medicated for pain    1205 states pain better/tolerable, vss, tolerating po fluids    1222 called report to elizabeth rn    1232 transferred to floor rm 224-2 in stable condition via bed w/transporter

## 2022-04-28 ENCOUNTER — APPOINTMENT (OUTPATIENT)
Dept: VASCULAR LAB | Facility: MEDICAL CENTER | Age: 77
End: 2022-04-28
Payer: MEDICARE

## 2022-04-28 VITALS
RESPIRATION RATE: 18 BRPM | TEMPERATURE: 97.8 F | HEART RATE: 73 BPM | OXYGEN SATURATION: 96 % | DIASTOLIC BLOOD PRESSURE: 44 MMHG | HEIGHT: 66 IN | BODY MASS INDEX: 25.9 KG/M2 | SYSTOLIC BLOOD PRESSURE: 94 MMHG | WEIGHT: 161.16 LBS

## 2022-04-28 PROCEDURE — 700102 HCHG RX REV CODE 250 W/ 637 OVERRIDE(OP): Performed by: STUDENT IN AN ORGANIZED HEALTH CARE EDUCATION/TRAINING PROGRAM

## 2022-04-28 PROCEDURE — 96375 TX/PRO/DX INJ NEW DRUG ADDON: CPT

## 2022-04-28 PROCEDURE — 700111 HCHG RX REV CODE 636 W/ 250 OVERRIDE (IP): Performed by: STUDENT IN AN ORGANIZED HEALTH CARE EDUCATION/TRAINING PROGRAM

## 2022-04-28 PROCEDURE — 97535 SELF CARE MNGMENT TRAINING: CPT

## 2022-04-28 PROCEDURE — 97161 PT EVAL LOW COMPLEX 20 MIN: CPT

## 2022-04-28 PROCEDURE — 97110 THERAPEUTIC EXERCISES: CPT

## 2022-04-28 PROCEDURE — 97165 OT EVAL LOW COMPLEX 30 MIN: CPT

## 2022-04-28 PROCEDURE — G0378 HOSPITAL OBSERVATION PER HR: HCPCS

## 2022-04-28 PROCEDURE — A9270 NON-COVERED ITEM OR SERVICE: HCPCS | Performed by: STUDENT IN AN ORGANIZED HEALTH CARE EDUCATION/TRAINING PROGRAM

## 2022-04-28 RX ADMIN — OXYCODONE HYDROCHLORIDE 5 MG: 5 TABLET ORAL at 02:54

## 2022-04-28 RX ADMIN — ASPIRIN 81 MG: 81 TABLET, COATED ORAL at 10:12

## 2022-04-28 RX ADMIN — DOCUSATE SODIUM 100 MG: 100 CAPSULE, LIQUID FILLED ORAL at 06:39

## 2022-04-28 RX ADMIN — DEXAMETHASONE SODIUM PHOSPHATE 10 MG: 4 INJECTION, SOLUTION INTRA-ARTICULAR; INTRALESIONAL; INTRAMUSCULAR; INTRAVENOUS; SOFT TISSUE at 06:40

## 2022-04-28 RX ADMIN — OXYCODONE HYDROCHLORIDE 5 MG: 5 TABLET ORAL at 06:39

## 2022-04-28 RX ADMIN — OMEPRAZOLE 20 MG: 20 CAPSULE, DELAYED RELEASE ORAL at 06:39

## 2022-04-28 ASSESSMENT — COGNITIVE AND FUNCTIONAL STATUS - GENERAL
DRESSING REGULAR LOWER BODY CLOTHING: A LITTLE
TOILETING: A LITTLE
SUGGESTED CMS G CODE MODIFIER DAILY ACTIVITY: CJ
MOBILITY SCORE: 23
CLIMB 3 TO 5 STEPS WITH RAILING: A LITTLE
DAILY ACTIVITIY SCORE: 20
HELP NEEDED FOR BATHING: A LITTLE
PERSONAL GROOMING: A LITTLE
SUGGESTED CMS G CODE MODIFIER MOBILITY: CI

## 2022-04-28 ASSESSMENT — PAIN DESCRIPTION - PAIN TYPE
TYPE: SURGICAL PAIN
TYPE: SURGICAL PAIN
TYPE: SURGICAL PAIN;ACUTE PAIN
TYPE: SURGICAL PAIN

## 2022-04-28 ASSESSMENT — ACTIVITIES OF DAILY LIVING (ADL): TOILETING: INDEPENDENT

## 2022-04-28 ASSESSMENT — GAIT ASSESSMENTS
DISTANCE (FEET): 200
GAIT LEVEL OF ASSIST: SUPERVISED
DEVIATION: ANTALGIC
ASSISTIVE DEVICE: FRONT WHEEL WALKER
DISTANCE (FEET): 30

## 2022-04-28 NOTE — DISCHARGE INSTRUCTIONS
Dr. Ibanez's Discharge Instructions:  The first week after your joint replacement is a time to recover from the surgery. We expect light exercise to keep you active and mobile. Dr. Ibanez requires a walker or cane for most patients in the first few days following surgery to prevent falls or complications.      Most patients are prescribed two medications for pain control: a narcotic such as Oxycodone or Hydrocodone or a milder medication called Tramadol. These can be alternated for pain control, and the priority is to decrease use of the stronger narcotic as soon as tolerated. In addition, you were prescribed Celebrex, an anti-inflammatory medication. Do not take any other types of anti-inflammatories with this medication. Last, you should take acetaminophen, either 500mg every 4 hours, or 1000mg every 8 hours, around the clock. Do not exceed 3000mg in a 24 hour period. The anti-inflammatory and acetaminophen will help reduce the amount of narcotic medication you require.      Take your pain medication as appropriate to ensure that your pain is not limiting your recovery. You'll be seen in clinic in 7-10 days (this appointment has already been made, call our office if you're unsure of the time). At that time, we'll prescribe your physical therapy to help with the recovery phase.      -Keep dressing clean and dry. Leave dressing in place until follow up. If you have an incisional vac dressing, the battery may die before your first post operative appointment, but you can leave the surgical dressing in place and it will be removed at your clinic visit. The battery of the dressing may die, and the sponge will inflate because it is no longer holding suction. You can still leave the dressing in place and it will be removed at the office. Call the office if you notice drainage from the surgical dressing.     -OK to shower, keep dressing in place. Pat dressing dry, do not rub incision     -Do not soak incision in bath, hot tub  or pool     -Follow up with Dr. Ibanez at regularly scheduled time     -Call [facility] office at [phone] for questions     -Weight bearing status: as tolerated     -Take aspirin 81mg twice daily  for 1 month to prevent blood clots    Discharge Instructions    Discharged to home by car with relative. Discharged via wheelchair, hospital escort: Yes.  Special equipment needed: Walker    Be sure to schedule a follow-up appointment with your primary care doctor or any specialists as instructed.     Discharge Plan:   Diet Plan: Discussed  Activity Level: Discussed  Confirmed Follow up Appointment: Patient to Call and Schedule Appointment  Confirmed Symptoms Management: Discussed  Medication Reconciliation Updated: Yes    I understand that a diet low in cholesterol, fat, and sodium is recommended for good health. Unless I have been given specific instructions below for another diet, I accept this instruction as my diet prescription.   Other diet: Regular    Special Instructions: Discharge instructions for the Orthopedic Patient    Follow up with Primary Care Physician within 2 weeks of discharge to home, regarding:  Review of medications and diagnostic testing.  Surveillance for medical complications.  Workup and treatment of osteoporosis, if appropriate.     -Is this a Hip/Knee/Shoulder Joint Replacement patient? Yes   TOTAL KNEE REPLACEMENT, AFTER-CARE GUIDELINES     These instructions provide you with information on caring for yourself and your knee after surgery. Your health care provider may also give you instructions that are more specific. Your treatment was planned and performed according to current medical practices but problems sometimes occur. Call your health care provider if you have any problems or questions.     WHAT TO EXPECT AFTER THE PROCEDURE   After your procedure, your knee will typically be stiff, sore, and bruised. This will improve over time.     Pain   · Follow your home pain management plan as  discussed with your nurse and as directed by your provider.   · It is important to follow any scheduled pain medications for maximal pain relief.   · If prescribed opioid medication, the goal is to use opioids only as needed and to wean off prescription pain medicine as soon as possible.   · Ice can be used for pain control.   · Put ice in a plastic bag.   · Place a towel between your skin and the bag.   · Leave the ice on for 20 minutes, 2-3 times a day at a minimum.   · Most patients are off the pain pills by 3 weeks. If your pain continues to be severe, follow up with your provider.     Infection   Knee joint infections occur in fewer than 2% of patients. The most common causes of infection following total knee replacement surgery are from bacteria that enter the bloodstream during dental procedures, urinary tract infections, or skin infections. These bacteria can lodge around your knee replacement and cause an infection.   · Keep the incision as clean and dry as possible.   · Always wash your hands before touching your incision.   · Avoid dental care for 3 months after surgery. Your provider may recommend taking a dose of antibiotics an hour prior to any dental procedure. After 2 years, most providers recommend antibiotics only before an extensive procedure. Ask your provider what they recommend.   · Signs and symptoms of infection include low-grade fever, redness, pain, swelling and drainage from your incision. Notify your provider IMMEDIATELY if you develop ANY of these symptoms.     Post op Disturbances   · Bowel Habits - Constipation is extremely common and caused by a combination of anesthesia, lack of mobility, dehydration and pain medicine. Use stool softeners or laxatives if necessary. It is important not to ignore this problem as bowel obstructions can be a serious complication after joint replacement surgery.   · Mood/Energy Level - Many patients experience a lack of energy and endurance for up to 2-3  months after surgery. Some people feel down and can even become depressed. This is likely due to postoperative anemia, change in activity level, lack of sleep, pain medicine and just the emotional reaction to the surgery itself that is a big disruption in a person’s life. This usually passes. If symptoms persist, follow up with your primary care provider.  · Returning to Work - Your provider will give you specific instructions based on your profession. Generally, if you work a sedentary job requiring little standing or walking, most patients may return within 2-6 weeks. Manual labor jobs involving walking, lifting and standing may take 3-4 months. Your provider’s office can provide a release to part-time or light duty work early on in your recovery and progress you to full duty as able.   · Driving - You can begin driving once cleared by your provider, provided you are no longer taking narcotic pain medication or any other medications that impair driving. Discuss the length of time expected with your provider as returning to driving depends on things such as your vehicle, which knee was replaced (right or left), and knee motion, strength and reflexes returning appropriately.   · Avoiding falls - A fall during the first few weeks after surgery can damage your new knee and may result in a need for further surgery.  throw rugs and tack down loose carpeting. Be aware of floor hazards such as pets, small objects or uneven surfaces. Notify your provider of any falls.   · Airport Metal Detectors - The sensitivity of metal detectors varies and it is likely that your prosthesis will cause an alarm. Inform the  of your artificial joint.     Diet   · Resume your normal diet as tolerated.   · It is important to achieve a healthy nutritional status by eating a well-balanced diet on a regular basis.   · Your provider may recommend that you take iron and vitamin supplements.   · Continue to drink plenty of  fluids.     Shower/Bathing   · You may shower as soon as you get home from the hospital unless otherwise instructed.   · Keep your incision out of water to prevent infection. To keep the incision dry when showering, cover it with a plastic bag or plastic wrap. If your bandage is waterproof, this may not be necessary. o Pat incision dry if it gets wet. Do not rub. Notify your provider.   · Do not submerge in a bath until cleared by your provider. Your staples must be out and the incision completely healed.     Dressing Change: Only change your dressing if directed by your provider.   · Wash hands.   · Open all dressing change materials.   · Remove old dressing and discard.   · Inspect incision for signs of irritation or infection including redness, increase in clear drainage, yellow/green drainage, odor and surrounding skin hot to touch. Notify your provider if present.   ·  the new dressing by one corner and lay over the incision. Be careful not to touch the inside of the dressing that will lay over the incision.   · Secure in place as instructed. Swelling/Bruising   · Swelling is normal after knee replacement and can involve the thigh, knee, calf and foot.   · Swelling can last from 3-6 months.   · To reduce swelling, elevate your leg higher than your heart while reclining. The first week you are home you should elevate your leg an equal amount of time as you are active.   · The swelling is usually worse after you go home since you are upright for longer periods of time.   · Bruising often does not appear until after you arrive home and can be quite dramatic- appearing purple, black, or green. Bruising is typically not concerning and will subside without any treatment.     Blood Clot Prevention   Your treatment plan includes multiple preventative measures to decrease the risk of blood clots in the legs (DVTs) and the less common, but serious, clots that travel to the lungs (pulmonary emboli). Most patients  are at standard risk for them, but people who are at higher risk include those who have had previous clots, a family history of clotting, smoking, diabetes, obesity, advanced age, use estrogen and/or live a sedentary lifestyle.     · Signs of blood clots in legs include - Swelling in thigh, calf or ankle that does not go down with elevation. Pain, heat and tenderness in calf, back of calf or groin area. NOTE: blood clots can occur in either leg.   · Signs of blood clots in lungs include - Sudden increased shortness of breath, sudden onset of chest pain, and localized chest pain with coughing.   · If you experience any of the above symptoms, notify your provider and seek medical attention immediately.   · You received anticoagulant therapy (blood thinners) in the hospital. Continue the prescribed blood-thinning medication at home, as directed by your provider.   · Your risk for developing a clot continues for up to 2-3 months after surgery. Avoid prolonged sitting and dehydration (long air trips and car trips). If you do take a trip during this time, please get up, move around every 1-1.5 hours, and discuss all travel plans with your provider.     Activity   Once home, stay active. The key is not to overdo it. While you can expect some good days and some bad days, you should notice a gradual improvement and a gradual increase in your endurance over the next 6 to 12 months. Exercise is a critical component of recovery, particularly during the first few weeks after surgery.     · Normal activities of daily living - Expect to resume most within 3 to 6 weeks following surgery. Some pain with activity and at night is common for several weeks after surgery. Walk as much as you like once your doctor gives permission to proceed, but remember that walking is no substitute for the exercises your doctor and physical therapist prescribe. Use a walker, crutches or cane to assist with walking until you can walk smoothly (minimal  or no limp) without assistance.   · Physical Therapy Exercises - Follow your home exercise program as instructed by your physical therapist during your hospital stay. Call and set up outpatient physical therapy appointments per your provider’s recommendations. Physical therapy after the hospital stay focuses on increasing your range of motion, strengthening your muscles and improving your gait/walking pattern. Contact your provider for the referral to outpatient physical therapy if you have not yet received this. -   · Riding a stationary bicycle can help maintain muscle tone and keep your knee flexible. Begin stationary bicycling as directed by your physical therapist or provider.   · Sexual Activity - Your provider can tell you when it safe to resume sexual activity.   · Sleeping Positions - You can safely sleep on your back, on either side, or on your stomach.   · Other Activities - Lower impact activities are preferred. Consult your provider if you have specific questions.     When to Call the Doctor   Call the provider if you experience:   · Fever over 100.5° F   · Increased pain, drainage, redness, odor or heat around the incision area   · Shaking chills   · Increased knee pain with activity and rest   · Increased pain in calf, tenderness or redness above or below the knee   · Increased swelling of calf, ankle, foot   · Sudden increased shortness of breath, sudden onset of chest pain, localized chest pain with coughing   · Incision opening   Or, if there are any questions or concerns about medications or care.     Infection statistic resource:   https://www.[employer].eGym/contents/prosthetic-joint-infection-epidemiology-microbiology-clinical-manifestations-and-diagnosis     -Is this patient being discharged with medication to prevent blood clots?  Yes, Aspirin Aspirin, ASA oral tablets  What is this medicine?  ASPIRIN (AS pir in) is a pain reliever. It is used to treat mild pain and fever. This medicine is also  used as directed by a doctor to prevent and to treat heart attacks, to prevent strokes and blood clots, and to treat arthritis or inflammation.  This medicine may be used for other purposes; ask your health care provider or pharmacist if you have questions.  COMMON BRAND NAME(S): Aspir-Low, Aspir-Sara, Aspirtab, Aura Advanced Aspirin, Aura Aspirin, Aura Aspirin Extra Strength, Aura Aspirin Plus, Aura Extra Strength, Aura Extra Strength Plus, Aura Genuine Aspirin, Aura Womens Aspirin, Bufferin, Bufferin Extra Strength, Bufferin Low Dose  What should I tell my health care provider before I take this medicine?  They need to know if you have any of these conditions:  · anemia  · asthma  · bleeding problems  · child with chickenpox, the flu, or other viral infection  · diabetes  · gout  · if you frequently drink alcohol containing drinks  · kidney disease  · liver disease  · low level of vitamin K  · lupus  · smoke tobacco  · stomach ulcers or other problems  · an unusual or allergic reaction to aspirin, tartrazine dye, other medicines, dyes, or preservatives  · pregnant or trying to get pregnant  · breast-feeding  How should I use this medicine?  Take this medicine by mouth with a glass of water. Follow the directions on the package or prescription label. You can take this medicine with or without food. If it upsets your stomach, take it with food. Do not take your medicine more often than directed.  Talk to your pediatrician regarding the use of this medicine in children. While this drug may be prescribed for children as young as 12 years of age for selected conditions, precautions do apply. Children and teenagers should not use this medicine to treat chicken pox or flu symptoms unless directed by a doctor.  Patients over 65 years old may have a stronger reaction and need a smaller dose.  Overdosage: If you think you have taken too much of this medicine contact a poison control center or emergency room at  once.  NOTE: This medicine is only for you. Do not share this medicine with others.  What if I miss a dose?  If you are taking this medicine on a regular schedule and miss a dose, take it as soon as you can. If it is almost time for your next dose, take only that dose. Do not take double or extra doses.  What may interact with this medicine?  Do not take this medicine with any of the following medications:  · cidofovir  · ketorolac  · probenecid  This medicine may also interact with the following medications:  · alcohol  · alendronate  · bismuth subsalicylate  · flavocoxid  · herbal supplements like feverfew, garlic, mauricio, ginkgo biloba, horse chestnut  · medicines for diabetes or glaucoma like acetazolamide, methazolamide  · medicines for gout  · medicines that treat or prevent blood clots like enoxaparin, heparin, ticlopidine, warfarin  · other aspirin and aspirin-like medicines  · NSAIDs, medicines for pain and inflammation, like ibuprofen or naproxen  · pemetrexed  · sulfinpyrazone  · varicella live vaccine  This list may not describe all possible interactions. Give your health care provider a list of all the medicines, herbs, non-prescription drugs, or dietary supplements you use. Also tell them if you smoke, drink alcohol, or use illegal drugs. Some items may interact with your medicine.  What should I watch for while using this medicine?  If you are treating yourself for pain, tell your doctor or health care professional if the pain lasts more than 10 days, if it gets worse, or if there is a new or different kind of pain. Tell your doctor if you see redness or swelling. Also, check with your doctor if you have a fever that lasts for more than 3 days. Only take this medicine to prevent heart attacks or blood clotting if prescribed by your doctor or health care professional.  Do not take aspirin or aspirin-like medicines with this medicine. Too much aspirin can be dangerous. Always read the labels  carefully.  This medicine can irritate your stomach or cause bleeding problems. Do not smoke cigarettes or drink alcohol while taking this medicine. Do not lie down for 30 minutes after taking this medicine to prevent irritation to your throat.  If you are scheduled for any medical or dental procedure, tell your healthcare provider that you are taking this medicine. You may need to stop taking this medicine before the procedure.  This medicine may be used to treat migraines. If you take migraine medicines for 10 or more days a month, your migraines may get worse. Keep a diary of headache days and medicine use. Contact your healthcare professional if your migraine attacks occur more frequently.  What side effects may I notice from receiving this medicine?  Side effects that you should report to your doctor or health care professional as soon as possible:  · allergic reactions like skin rash, itching or hives, swelling of the face, lips, or tongue  · breathing problems  · changes in hearing, ringing in the ears  · confusion  · general ill feeling or flu-like symptoms  · pain on swallowing  · redness, blistering, peeling or loosening of the skin, including inside the mouth or nose  · signs and symptoms of bleeding such as bloody or black, tarry stools; red or dark-brown urine; spitting up blood or brown material that looks like coffee grounds; red spots on the skin; unusual bruising or bleeding from the eye, gums, or nose  · trouble passing urine or change in the amount of urine  · unusually weak or tired  · yellowing of the eyes or skin  Side effects that usually do not require medical attention (report to your doctor or health care professional if they continue or are bothersome):  · diarrhea or constipation  · headache  · nausea, vomiting  · stomach gas, heartburn  This list may not describe all possible side effects. Call your doctor for medical advice about side effects. You may report side effects to FDA at  1-800-FDA-1088.  Where should I keep my medicine?  Keep out of the reach of children.  Store at room temperature between 15 and 30 degrees C (59 and 86 degrees F). Protect from heat and moisture. Do not use this medicine if it has a strong vinegar smell. Throw away any unused medicine after the expiration date.  NOTE: This sheet is a summary. It may not cover all possible information. If you have questions about this medicine, talk to your doctor, pharmacist, or health care provider.  © 2020 [employer] Standard (2018-01-30 10:42:13)      · Is patient discharged on Warfarin / Coumadin?   No     Depression / Suicide Risk    As you are discharged from this [facility] facility, it is important to learn how to keep safe from harming yourself.    Recognize the warning signs:  · Abrupt changes in personality, positive or negative- including increase in energy   · Giving away possessions  · Change in eating patterns- significant weight changes-  positive or negative  · Change in sleeping patterns- unable to sleep or sleeping all the time   · Unwillingness or inability to communicate  · Depression  · Unusual sadness, discouragement and loneliness  · Talk of wanting to die  · Neglect of personal appearance   · Rebelliousness- reckless behavior  · Withdrawal from people/activities they love  · Confusion- inability to concentrate     If you or a loved one observes any of these behaviors or has concerns about self-harm, here's what you can do:  · Talk about it- your feelings and reasons for harming yourself  · Remove any means that you might use to hurt yourself (examples: pills, rope, extension cords, firearm)  · Get professional help from the community (Mental Health, Substance Abuse, psychological counseling)  · Do not be alone:Call your Safe Contact- someone whom you trust who will be there for you.  · Call your local CRISIS HOTLINE 158-1901 or 145-024-9118  · Call your local Children's Mobile Crisis Response Team  [facility] (375) 753-5229 or www.vitalclip.SimpliVT  · Call the toll free National Suicide Prevention Hotlines   · National Suicide Prevention Lifeline 422-232-NXNE (3832)  · National Hope Line Network 800-SUICIDE (678-5438)

## 2022-04-28 NOTE — PROGRESS NOTES
Received report from NOC RN, assumed patient care at 0700.  R knee dressing CDI, CMS intact, able to plantar and dorsi flex.  SCDs and polar ice applied.  Updated with plan of care.

## 2022-04-28 NOTE — CARE PLAN
The patient is Stable - Low risk of patient condition declining or worsening    Shift Goals  Clinical Goals: Pt will ambulate 50 feet and will void by end of shift  Patient Goals: Pt will experience adequate pain control through end of shift    Progress made toward(s) clinical / shift goals:  Patient ambulated multiple times through shift and did well.  Patient voided two times during shift.    Patient was medicated per MAR and was able to experience adequate pain control through shift.    Patient is not progressing towards the following goals:

## 2022-04-28 NOTE — DISCHARGE SUMMARY
Chacha Fraire was admitted on 4/27/2022 for Osteoarthritis of knee, unilateral [M17.10]  Patient was diagnosed with severe degenerative arthritis in the right knee and underwent a total knee arthroplasty by Dr. Ibanez on the date of admission. Please see dictated operative note for further information.    Hospital course:     The patient has done well, with no complications.  Patient denies chest pain, calf pain or shortness of breath.   Pain is well-controlled at present.  Patient is ambulating well with the use of an assistive device, and progressing in physical therapy.   Patient is neurologically and vascularly intact with palpable pedal pulses bilaterally.   Narcotic dosages were chosen by taking into account the the patient's previous history of opoid use and to ensure proper pain control after surgery    Discharge date: 4/28/22    Patient is being discharged to home after am physical therapy.     Allergies:  Crestor [rosuvastatin calcium], Flagyl [metronidazole], Hydrochlorothiazide w-triamterene, and Rosuvastatin       Medication List      START taking these medications      Instructions   oxyCODONE immediate-release 5 MG Tabs  Commonly known as: ROXICODONE   Take 1 Tablet by mouth every four hours as needed for Severe Pain for up to 7 days.  Dose: 5 mg     polyethylene glycol/lytes 17 g Pack  Commonly known as: MiraLax   Take 1 Packet by mouth every day.  Dose: 17 g     traMADol 50 MG Tabs  Commonly known as: ULTRAM   Take 1 Tablet by mouth every four hours as needed for Moderate Pain for up to 15 days.  Dose: 50 mg        CHANGE how you take these medications      Instructions   aspirin EC 81 MG Tbec  What changed: when to take this  Commonly known as: ECOTRIN   Take 1 Tablet by mouth 2 (two) times a day for 30 days.  Dose: 81 mg        CONTINUE taking these medications      Instructions   acetaminophen 650 MG CR tablet  Commonly known as: TYLENOL   Take 650 mg by mouth every 6 hours as  needed.  Dose: 650 mg     atorvastatin 20 MG Tabs  Commonly known as: LIPITOR   Take 1 Tablet by mouth every day for 360 days.  Dose: 20 mg     B COMPLEX PLUS PO   Take 1 Tab by mouth every day.  Dose: 1 Tablet     benazepril 40 MG tablet  Commonly known as: LOTENSIN   Take 1 Tablet by mouth at bedtime.  Dose: 40 mg     Diclofenac Sodium 1 % Gel   APPLY 1 GRAM TOPICALLY THREE TIMES DAILY AS NEEDED FOR PAIN     doxazosin 2 MG Tabs  Commonly known as: CARDURA   Take 1 Tablet by mouth at bedtime.  Dose: 2 mg     famotidine 20 MG Tabs  Commonly known as: PEPCID   Take 20 mg by mouth 1 time daily as needed.  Dose: 20 mg     loratadine 10 MG Tabs  Commonly known as: CLARITIN   Take 10 mg by mouth as needed.  Dose: 10 mg     METAMUCIL PO   Take  by mouth.     MULTIVITAMIN PO   Take 1 Tablet by mouth every day.  Dose: 1 Tablet     omeprazole 20 MG delayed-release capsule  Commonly known as: PRILOSEC   Take 20 mg by mouth every day.  Dose: 20 mg     spironolactone 25 MG Tabs  Commonly known as: ALDACTONE   Take 1 Tablet by mouth every day for 360 days.  Dose: 25 mg     Vitamin C 1000 MG Tabs   Take 1,000 mg by mouth every day.  Dose: 1,000 mg     vitamin D 1000 Unit (25 mcg) Tabs  Commonly known as: cholecalciferol   Take 1,000 Units by mouth every day. D3  Dose: 1,000 Units            Discharge Instructions:     Patient is instructed to ambulate and weight bear as tolerated with the use of an assistive device, and to continue physical therapy exercises given during this hospital stay. Strict posterior hip precautions are to be observed for patients who underwent a total hip replacement.   Patient is to ice and elevate the surgical leg regularly, with pillows under the ankle, nothing is to be placed under the knee.   Patient was given detailed wound care instructions, and will leave the Incisional vac or silver dressing on until first post-op visit.   81mg Aspirin twice daily for DVT prophylaxis.  Patient is to follow up  with Dr. Ibanez's office in 2 weeks.    Liam Weller M.D.

## 2022-04-28 NOTE — DISCHARGE PLANNING
Anticipated Discharge Disposition: Home with DME walker from [facility]     Action: Per bedside RN, DME walker from [facility] was delivered to pt's bedside.     Barriers to Discharge: none     Plan:  f/u with medical team to discuss DC needs/barriers

## 2022-04-28 NOTE — PROGRESS NOTES
Received report from dayshift RN. Assumed care of patient at 1915. Pt awake and alert. Assessment completed. VSS. Denies pain, numbness, or tingling at this time. Denies nausea/vomiting at this time. Pt updated on plan of care for the day. All questions answered. No other needs at this time. Call light and belongings within reach, bed locked and in lowest position. Pt educated to call for assistance.

## 2022-04-28 NOTE — THERAPY
"Occupational Therapy   Initial Evaluation     Patient Name: Chacha Fraire  Age:  76 y.o., Sex:  female  Medical Record #: [id number]  Today's Date: 4/28/2022     Precautions  Precautions: No Weight Bearing Restrictions Right Lower Extremity    Assessment  Patient is 76 y.o. female admit for R TKA.  Pt tolerating simple mobility with FWW needed for household activities and ADL's.  Able to perform simple ADl's with supervision except assist for shoes and LISA hose.  Pt will have assist initially from family.  She appears safe for home with family, no further OT needs in this setting at this time.       Treatment this session:  Extensive education provided, see flow sheet below    Plan    Recommend Occupational Therapy for Evaluation only     DC Equipment Recommendations: None  Discharge Recommendations: Anticipate that the patient will have no further occupational therapy needs after discharge from the hospital     Subjective    \"I'm feeling better already.\"     Objective       04/28/22 0931   Prior Living Situation   Prior Services None   Housing / Facility 3 Story House  (can stay on main level initially)   Steps Into Home 5   Steps In Home 15   Rail Both Rail (Steps in Home);Left Rail  (Steps into Home)   Bathroom Set up Bathtub / Shower Combination;Walk In Shower;Grab Bars  (folding chair that fits in tub.  WIS upstairs, can use later)   Equipment Owned Single Point Cane;Toilet Frame;Raised Toilet Seat Without Arms;Hand Held Shower;Reacher   Lives with - Patient's Self Care Capacity Alone and Able to Care For Self   Comments Son and DIL avail to assist when she goes home- will have friends and assist as well as neighbors   Prior Level of ADL Function   Self Feeding Independent   Grooming / Hygiene Independent   Bathing Independent   Dressing Independent   Toileting Independent   Prior Level of IADL Function   Medication Management Independent   Laundry Independent   Kitchen Mobility Independent   Finances " Independent   Home Management Independent   Shopping Independent   Prior Level Of Mobility Independent Without Device in Community   Driving / Transportation Driving Independent   Occupation (Pre-Hospital Vocational) Retired Due To Age   Leisure Interests Unable To Determine At This Time   Precautions   Precautions No Weight Bearing Restrictions Right Lower Extremity   Vitals   O2 Delivery Device None - Room Air   Pain 0 - 10 Group   Location Knee   Location Orientation Right   Description Aching   Therapist Pain Assessment During Activity;4;Nurse Notified   Cognition    Cognition / Consciousness WDL   Level of Consciousness Alert   Comments pleasant, cooperative, appears to be slightly forgetful, may be pain meds related   Active ROM Upper Body   Active ROM Upper Body  WDL   Dominant Hand Right   Strength Upper Body   Upper Body Strength  WDL   Balance Assessment   Sitting Balance (Static) Good   Sitting Balance (Dynamic) Fair +   Standing Balance (Static) Good   Standing Balance (Dynamic) Fair +   Weight Shift Sitting Good   Weight Shift Standing Fair   Comments with FWW   Bed Mobility    Supine to Sit Supervised   Sit to Supine Supervised   Scooting Supervised   Rolling Supervised   ADL Assessment   Eating Independent   Grooming Supervision;Standing   Bathing   (educated- plans to sponge bathe initially)   Upper Body Dressing Modified Independent   Lower Body Dressing Minimal Assist  (assist for shoes and socks)   Toileting Supervision   How much help from another person does the patient currently need...   Putting on and taking off regular lower body clothing? 3   Bathing (including washing, rinsing, and drying)? 3   Toileting, which includes using a toilet, bedpan, or urinal? 3   Putting on and taking off regular upper body clothing? 4   Taking care of personal grooming such as brushing teeth? 3   Eating meals? 4   6 Clicks Daily Activity Score 20   Functional Mobility   Sit to Stand Supervised   Bed, Chair,  Wheelchair Transfer Supervised   Toilet Transfers Supervised   Tub / Shower Transfers   (educ on step in tub transfers)   Transfer Method Stand Step   Mobility bed to toilet to bed, to sink and to bed   Distance (Feet) 30   # of Times Distance was Traveled 2   Visual Perception   Visual Perception  WDL   Edema / Skin Assessment   Comments Dressing and ace wrap CDI   Activity Tolerance   Sitting Edge of Bed 10 min x2   Standing 5 min x2   Comments tolerated well, no c/o dizziiness   Patient / Family Goals   Patient / Family Goal #1 home   Education Group   Education Provided Home Safety;Transfers;Role of Occupational Therapist;Activities of Daily Living;Adaptive Equipment   Role of Occupational Therapist Patient Response Patient;Acceptance;Explanation;Verbal Demonstration   Home Safety Patient Response Patient;Acceptance;Explanation;Verbal Demonstration   Transfers Patient Response Patient;Acceptance;Explanation;Verbal Demonstration   ADL Patient Response Patient;Acceptance;Explanation;Verbal Demonstration   Adaptive Equipment Patient Response Patient;Acceptance;Explanation;Verbal Demonstration   Additional Comments Pt educated at length on safety with bathroom and functional transfers as well as AE to assist post TKA.  Reviewed strategies for ease with dressing and bathing, proper positioning of leg at rest, and how to don and doff LISA hose

## 2022-04-28 NOTE — THERAPY
Physical Therapy   Initial Evaluation     Patient Name: Chacha Fraire  Age:  76 y.o., Sex:  female  Medical Record #: [id number]  Today's Date: 4/28/2022     Precautions  Precautions: (P) No Weight Bearing Restrictions Right Lower Extremity    Assessment  Patient is 76 y.o. female who was seen s/p R TKA. Pt was agreeable to therapy evaluation. Pt was able to demonstrate SPV level with all mobility using FWW. Pt was able to return demo safe mechanics during flat level ambulation and during navigation of stairs. Pt was provided with occasional cues for heel to toe mechanics, along with hand placement for sit<>stands on bed surface and FWW. Pt was provided with education on supine therapeutic exercises, elevation, icing, and positioning of extension. Pt is in no acute skilled PT needs at this time, anticipate pt to d/c home once medically clear with recs for FWW use and OP therapy services.     The following txt were provided: Pt provided with extensive education regarding therapeutic exercises along with demonstration, cues, and manual facilitation to promote correct mechanics during exercises. Pt as able to return demo correct mechanics at end of session.     Plan    Recommend Physical Therapy for Evaluation only     DC Equipment Recommendations: (P) Front-Wheel Walker  Discharge Recommendations: (P) Recommend outpatient physical therapy services to address higher level deficits     Objective       04/28/22 0810   Initial Contact Note    Initial Contact Note Order Received and Verified, Evaluation Only - Patient Does Not Require Further Acute Physical Therapy at this Time.  However, May Benefit from Post Acute Therapy for Higher Level Functional Deficits.   Precautions   Precautions No Weight Bearing Restrictions Right Lower Extremity   Pain 0 - 10 Group   Location Knee   Location Orientation Right   Description Aching   Therapist Pain Assessment Prior to Activity;During Activity;Post Activity;Nurse Notified;4    Prior Living Situation   Prior Services None   Housing / Facility 3 Story House  (will stay on frist floor)   Steps Into Home 5   Steps In Home 15   Rail Both Rail (Steps in Home);Left Rail  (Steps into Home)   Equipment Owned Single Point Cane;Raised Toilet Seat With Arms   Lives with - Patient's Self Care Capacity Alone and Able to Care For Self   Comments pt states she will have son and dtr in law to assist upon d/c to home   Prior Level of Functional Mobility   Bed Mobility Independent   Transfer Status Independent   Ambulation Independent   Distance Ambulation (Feet)   (community)   Assistive Devices Used None   Stairs Independent   History of Falls   History of Falls No   Cognition    Cognition / Consciousness WDL   Level of Consciousness Alert   Comments pleasant/cooperative   Passive ROM Lower Body   Passive ROM Lower Body X   Comments limited due to pain; able to demo 0 to 90 deg of R knee ROM   Active ROM Lower Body    Active ROM Lower Body  X   Comments same as above   Strength Lower Body   Lower Body Strength  X   Comments limited due to pain; able to demo functional strength for B LE   Sensation Lower Body   Lower Extremity Sensation   WDL   Lower Body Muscle Tone   Lower Body Muscle Tone  WDL   Strength Upper Body   Upper Body Strength  WDL   Upper Body Muscle Tone   Upper Body Muscle Tone  WDL   Neurological Concerns   Neurological Concerns No   Coordination Upper Body   Coordination WDL   Coordination Lower Body    Coordination Lower Body  WDL   Balance Assessment   Sitting Balance (Static) Good   Sitting Balance (Dynamic) Fair +   Standing Balance (Static) Good   Standing Balance (Dynamic) Fair +   Weight Shift Sitting Good   Weight Shift Standing Fair   Comments w/fww   Gait Analysis   Gait Level Of Assist Supervised   Assistive Device Front Wheel Walker   Distance (Feet) 200   # of Times Distance was Traveled 1   Deviation Antalgic   # of Stairs Climbed 5   Level of Assist with Stairs Supervised    Weight Bearing Status none noted   Comments cues for heel to toe mechancis and able to return demo correct mechanics   Bed Mobility    Supine to Sit Supervised   Sit to Supine Supervised   Scooting Supervised   Rolling Supervised   Functional Mobility   Sit to Stand Supervised   Bed, Chair, Wheelchair Transfer Supervised   Transfer Method Stand Step   Mobility EOB, sit<>stand, ambulation, stairs, back to bed   Comments cues for handplacement during sit<>stands   How much difficulty does the patient currently have...   Turning over in bed (including adjusting bedclothes, sheets and blankets)? 4   Sitting down on and standing up from a chair with arms (e.g., wheelchair, bedside commode, etc.) 4   Moving from lying on back to sitting on the side of the bed? 4   How much help from another person does the patient currently need...   Moving to and from a bed to a chair (including a wheelchair)? 4   Need to walk in a hospital room? 4   Climbing 3-5 steps with a railing? 3   6 clicks Mobility Score 23   Activity Tolerance   Sitting in Chair NT   Sitting Edge of Bed 8 mins   Standing 10 mins   Comments no adverse events noted   Edema / Skin Assessment   Edema / Skin  Not Assessed   Education Group   Education Provided Role of Physical Therapist;Exercises - Supine;Exercises - Seated   Role of Physical Therapist Patient Response Patient;Acceptance;Demonstration;Explanation;Action Demonstration;Verbal Demonstration   Exercises - Supine Patient Response Patient;Acceptance;Explanation;Demonstration;Verbal Demonstration;Action Demonstration;Handout   Exercises - Seated Patient Response Patient;Acceptance;Explanation;Demonstration;Handout;Action Demonstration;Verbal Demonstration   Anticipated Discharge Equipment and Recommendations   DC Equipment Recommendations Front-Wheel Walker   Discharge Recommendations Recommend outpatient physical therapy services to address higher level deficits   Interdisciplinary Plan of Care  Collaboration   IDT Collaboration with  Nursing   Patient Position at End of Therapy In Bed;Call Light within Reach;Tray Table within Reach;Phone within Reach   Collaboration Comments aware of visit and recs   Session Information   Date / Session Number  4/28 eval only   Priority 0

## 2022-05-09 ENCOUNTER — PATIENT MESSAGE (OUTPATIENT)
Dept: CARDIOLOGY | Facility: MEDICAL CENTER | Age: 77
End: 2022-05-09
Payer: MEDICARE

## 2022-05-13 ENCOUNTER — HOSPITAL ENCOUNTER (EMERGENCY)
Facility: MEDICAL CENTER | Age: 77
End: 2022-05-13
Attending: EMERGENCY MEDICINE
Payer: MEDICARE

## 2022-05-13 ENCOUNTER — APPOINTMENT (OUTPATIENT)
Dept: RADIOLOGY | Facility: MEDICAL CENTER | Age: 77
End: 2022-05-13
Attending: EMERGENCY MEDICINE
Payer: MEDICARE

## 2022-05-13 VITALS
HEIGHT: 66 IN | BODY MASS INDEX: 25.79 KG/M2 | WEIGHT: 160.5 LBS | SYSTOLIC BLOOD PRESSURE: 131 MMHG | OXYGEN SATURATION: 97 % | HEART RATE: 80 BPM | TEMPERATURE: 97.3 F | RESPIRATION RATE: 18 BRPM | DIASTOLIC BLOOD PRESSURE: 72 MMHG

## 2022-05-13 DIAGNOSIS — G89.18 POSTOPERATIVE PAIN OF KNEE: ICD-10-CM

## 2022-05-13 DIAGNOSIS — M25.569 POSTOPERATIVE PAIN OF KNEE: ICD-10-CM

## 2022-05-13 DIAGNOSIS — M79.89 PAIN AND SWELLING OF RIGHT LOWER LEG: ICD-10-CM

## 2022-05-13 DIAGNOSIS — M79.661 PAIN AND SWELLING OF RIGHT LOWER LEG: ICD-10-CM

## 2022-05-13 PROCEDURE — 93971 EXTREMITY STUDY: CPT | Mod: RT

## 2022-05-13 PROCEDURE — 99284 EMERGENCY DEPT VISIT MOD MDM: CPT

## 2022-05-13 ASSESSMENT — FIBROSIS 4 INDEX: FIB4 SCORE: 1.26

## 2022-05-13 NOTE — ED PROVIDER NOTES
ED Provider Note    ED Provider Note    Scribed for Iqra Hair MD by Iqra Hair M.D.. 5/13/2022, 11:58 AM.    Primary care provider: Zelalem Vo M.D.  Means of arrival: Private  History obtained from: Patient  History limited by: None    CHIEF COMPLAINT  Chief Complaint   Patient presents with   • Leg Pain     R calf pain, had knee replacement on 4/27. Was sent over for evaluation to r/o blood clot.        HPI  Chacha Fraire is a 76 y.o. female who presents to the Emergency Department for evaluation of swelling to the right lower extremity.  Patient is status post right total knee replacement by Dr. Ibanez on the 27th.  Patient relates she has since had her staples removed, she has been doing well and going to physical therapy.  She notes bruising distal to the knee which she had expected given the significant surgery noting she has not really been elevating as much as she should have.  Patient has no history of DVT and is not anticoagulated but is currently on aspirin regimen noting history of atrial fibrillation status post ablation in 2004.  She never had a DVT nor is her family history of DVT.    REVIEW OF SYSTEMS  Pertinent positives include postoperative discomfort and swelling to the right leg. Pertinent negatives include no fever, no drainage from the wound, no chest pain or dyspnea, no previous DVT, no numbness nor weakness, no inability to ambulate.      PAST MEDICAL HISTORY   has a past medical history of Arthritis, Benign essential HTN (5/29/2012), DDD (degenerative disc disease), lumbar, Heart burn, High cholesterol, Hyperlipidemia (5/29/2012), PAF (paroxysmal atrial fibrillation) ([facility]) (5/29/2012), Personal history of prior ablation treatment (5/29/2012), and PONV (postoperative nausea and vomiting).    SURGICAL HISTORY   has a past surgical history that includes total knee arthroplasty (Right, 4/27/2022).    SOCIAL HISTORY  Social History     Tobacco Use   •  "Smoking status: Never Smoker   • Smokeless tobacco: Never Used   Vaping Use   • Vaping Use: Never used   Substance Use Topics   • Alcohol use: Yes     Alcohol/week: 1.8 oz     Types: 3 Glasses of wine per week   • Drug use: Never      Social History     Substance and Sexual Activity   Drug Use Never       FAMILY HISTORY  No history of thromboembolic disease in the immediate family    CURRENT MEDICATIONS  Home Medications    **Home medications have not yet been reviewed for this encounter**         ALLERGIES  Allergies   Allergen Reactions   • Crestor [Rosuvastatin Calcium]    • Flagyl [Metronidazole] Nausea   • Hydrochlorothiazide W-Triamterene      Other reaction(s): Other (See Comments)  hyponatremia   • Rosuvastatin      Other reaction(s): .Unknown       PHYSICAL EXAM  VITAL SIGNS: /62   Pulse 82   Temp 36.9 °C (98.5 °F) (Temporal)   Resp 18   Ht 1.676 m (5' 6\")   Wt 72.8 kg (160 lb 7.9 oz)   SpO2 96%   BMI 25.90 kg/m²     General: Alert, no acute distress  Skin: Warm, dry, normal for ethnicity  Head: Normocephalic, atraumatic  Neck: Trachea midline  Eye:  normal conjunctiva without pallor  Cardiovascular: Regular rate and rhythm, No murmur, Normal peripheral perfusion.  2+ DP and PT pulses on the affected right side.  Respiratory: Lungs CTA, respirations are non-labored, chest rise is equal  Musculoskeletal: Mild swelling to the right lower extremity more so of the right lower leg.  Patient's right knee arthroplasty scar at the midline is clean, dry, intact with no exudate no erythema no warmth.  She has no calf tenderness, very mild superficial hematomas appreciated more so to the anterior tibial region.  Neurological: Alert and oriented to person, place, time, and situation.  Dorsal plantarflexion of foot and toes grossly intact, sensation light touch grossly intact.  Lymphatics: No right lower extremity lymphangitis  Psychiatric: Cooperative, appropriate mood & affect    RADIOLOGY  US-EXTREMITY " "VENOUS LOWER UNILAT RIGHT   Final Result        The radiologist's interpretation of all radiological studies have been reviewed by me.    COURSE & MEDICAL DECISION MAKING  Pertinent Labs & Imaging studies reviewed. (See chart for details)    11:58 AM - Patient seen and examined at bedside. Ordered right lower extremity venous Doppler to evaluate her symptoms. The differential diagnoses include but are not limited to: DVT, peripheral edema, postoperative discomfort    1323: Patient reassessed, in no acute distress.  She is quite relieved to hear of unremarkable ultrasound.    Patient Vitals for the past 24 hrs:   BP Temp Temp src Pulse Resp SpO2 Height Weight   05/13/22 1118 134/62 36.9 °C (98.5 °F) Temporal 82 18 96 % 1.676 m (5' 6\") 72.8 kg (160 lb 7.9 oz)         Decision Making:  This is a 76 y.o. year old female who presents with atraumatic swelling of the right leg, recent right total knee done on the 27th of last month.  Patient relates she was sent in to be evaluated for possible DVT.  Thankfully she is neurovascular intact and strong pulses normal skin tone, this would not be consistent with arterial thrombus.  There is mild swelling and superficial hematoma noted but otherwise her leg is quite well in appearance considering she is recently postop.  The wound is quite clean with no evidence of drainage.  She has no history of thromboembolic disease in herself or her immediate family.  Thankfully DVT study is negative.  Recommend she continue her physical therapy and outpatient follow-up as previously planned.    The patient will return for new or worsening symptoms and is stable at the time of discharge.    Patient has had high blood pressure while in the emergency department, felt likely secondary to medical condition. Counseled patient to monitor blood pressure at home and follow up with primary care physician.     DISPOSITION:  Patient will be discharged home in stable condition.    FOLLOW " UP:  Zelalem Vo M.D.  [address] [facility] [facility] [address]  [facility] [address]  [phone]    Schedule an appointment as soon as possible for a visit       Pedro Ibanez M.D.  [address] [place] [id number] [phone]    Schedule an appointment as soon as possible for a visit         OUTPATIENT MEDICATIONS:  New Prescriptions    No medications on file         FINAL IMPRESSION  1. Postoperative pain of knee    2. Pain and swelling of right lower leg          IIqra M.D. (Scriblloyd), am scribing for, and in the presence of, Iqra Hair MD.    Electronically signed by: Iqra Hair M.D. (Scribe), 5/13/2022    IIqra MD personally performed the services described in this documentation, as scribed by Iqra Hair M.D. in my presence, and it is both accurate and complete    The note accurately reflects work and decisions made by me.  Iqra Hair M.D.  5/13/2022  1:37 PM

## 2022-05-13 NOTE — ED TRIAGE NOTES
"Chief Complaint   Patient presents with   • Leg Pain     R calf pain, had knee replacement on 4/27. Was sent over for evaluation to r/o blood clot.      /62   Pulse 82   Temp 36.9 °C (98.5 °F) (Temporal)   Resp 18   Ht 1.676 m (5' 6\")   Wt 72.8 kg (160 lb 7.9 oz)   SpO2 96%   BMI 25.90 kg/m²     "

## 2022-06-28 ENCOUNTER — HOSPITAL ENCOUNTER (OUTPATIENT)
Dept: LAB | Facility: MEDICAL CENTER | Age: 77
End: 2022-06-28
Attending: FAMILY MEDICINE
Payer: MEDICARE

## 2022-06-28 DIAGNOSIS — I10 PRIMARY HYPERTENSION: ICD-10-CM

## 2022-06-28 LAB
ANION GAP SERPL CALC-SCNC: 12 MMOL/L (ref 7–16)
BUN SERPL-MCNC: 23 MG/DL (ref 8–22)
CALCIUM SERPL-MCNC: 9.4 MG/DL (ref 8.5–10.5)
CHLORIDE SERPL-SCNC: 105 MMOL/L (ref 96–112)
CO2 SERPL-SCNC: 22 MMOL/L (ref 20–33)
CREAT SERPL-MCNC: 1.02 MG/DL (ref 0.5–1.4)
GFR SERPLBLD CREATININE-BSD FMLA CKD-EPI: 57 ML/MIN/1.73 M 2
GLUCOSE SERPL-MCNC: 95 MG/DL (ref 65–99)
POTASSIUM SERPL-SCNC: 4.3 MMOL/L (ref 3.6–5.5)
SODIUM SERPL-SCNC: 139 MMOL/L (ref 135–145)

## 2022-06-28 PROCEDURE — 80048 BASIC METABOLIC PNL TOTAL CA: CPT

## 2022-06-28 PROCEDURE — 36415 COLL VENOUS BLD VENIPUNCTURE: CPT

## 2022-06-30 ENCOUNTER — OFFICE VISIT (OUTPATIENT)
Dept: VASCULAR LAB | Facility: MEDICAL CENTER | Age: 77
End: 2022-06-30
Attending: FAMILY MEDICINE
Payer: MEDICARE

## 2022-06-30 VITALS
DIASTOLIC BLOOD PRESSURE: 81 MMHG | HEART RATE: 87 BPM | HEIGHT: 66 IN | SYSTOLIC BLOOD PRESSURE: 145 MMHG | WEIGHT: 155.7 LBS | BODY MASS INDEX: 25.02 KG/M2

## 2022-06-30 DIAGNOSIS — I10 PRIMARY HYPERTENSION: ICD-10-CM

## 2022-06-30 DIAGNOSIS — Z98.890 S/P ABLATION OF ATRIAL FIBRILLATION: ICD-10-CM

## 2022-06-30 DIAGNOSIS — Z79.02 LONG TERM (CURRENT) USE OF ANTITHROMBOTICS/ANTIPLATELETS: ICD-10-CM

## 2022-06-30 DIAGNOSIS — E78.00 PRIMARY HYPERCHOLESTEROLEMIA: ICD-10-CM

## 2022-06-30 DIAGNOSIS — E03.9 HYPOTHYROIDISM, UNSPECIFIED TYPE: ICD-10-CM

## 2022-06-30 DIAGNOSIS — M79.89 LEG SWELLING: ICD-10-CM

## 2022-06-30 DIAGNOSIS — I48.0 PAF (PAROXYSMAL ATRIAL FIBRILLATION) (HCC): ICD-10-CM

## 2022-06-30 DIAGNOSIS — E78.41 ELEVATED LIPOPROTEIN(A): ICD-10-CM

## 2022-06-30 DIAGNOSIS — M35.3 PMR (POLYMYALGIA RHEUMATICA) (HCC): ICD-10-CM

## 2022-06-30 DIAGNOSIS — Z86.79 S/P ABLATION OF ATRIAL FIBRILLATION: ICD-10-CM

## 2022-06-30 PROCEDURE — 99214 OFFICE O/P EST MOD 30 MIN: CPT | Performed by: FAMILY MEDICINE

## 2022-06-30 PROCEDURE — 99212 OFFICE O/P EST SF 10 MIN: CPT

## 2022-06-30 ASSESSMENT — ENCOUNTER SYMPTOMS
COUGH: 0
MYALGIAS: 0
BRUISES/BLEEDS EASILY: 0
WEAKNESS: 0
NAUSEA: 0
PALPITATIONS: 0
CLAUDICATION: 0
FEVER: 0
ORTHOPNEA: 0
CHILLS: 0

## 2022-06-30 ASSESSMENT — FIBROSIS 4 INDEX: FIB4 SCORE: 1.26

## 2022-06-30 NOTE — PROGRESS NOTES
FOLLOW UP VASCULAR MEDICINE VISIT  Chacha Fraire is a. female  who presents 03/28/22  for   Chief Complaint   Patient presents with   • Follow-Up     initially referred by Angel Burgos M.D. for  eval and mgmt of HTN      Subjective      Interval hx/concerns: s/p R TKA, feeling well.  Has been off ASA and Celebrex due to nosebleeds.  Had cautery at ENT office.  Has purchased ponaris and pending to start.     HTN:  Interval hx/concerns: no issues    Home BP log:  avg low 130s/mid 80s   Lifestyle factors:   Weight Change since last visit: up 8 lbs since last visit if weight is correct  BMI Readings from Last 5 Encounters:   06/30/22 25.13 kg/m²   06/22/22 25.34 kg/m²   05/13/22 25.90 kg/m²   05/09/22 25.99 kg/m²   04/27/22 26.01 kg/m²     Diet pattern changes since last visit: DASH   Daily salt intake estimate:  Low     EtOH: No  Exercise habits: prior active, but has bursitis and limited   Perceived barriers to care: pain in R hip   Pertinent HTN hx (reviewed at initial visit):   Age at HTN dx: years   (if female, any hx of pregnancy-related HTN): n/a   Past HTN medications: as noted   HTN crises:  No prior hospitalization or crises   Interfering substances/contributing factors:  None    Hyperlipidemia:  Stable, no changes.  Taking atorva 20mg, no myalgias     Afib:   Stable, no changes   Seeing Dr. Burgos (EP), s/p ablation.  No OAC therapy.  No current sx.    Has [employer] mobile to use for afib eval at home     Antiplatelet/anticoagulation: off ASA due to nosebleeds     CKD  Low GFR   Denies weight loss, poor appetite, SOB, fatigue, gross hematuria, cramping, HA  Not currently seeing nephrology      PMR:  Seeing rheum, off prednisone 2/2022.         Current Outpatient Medications:   •  doxazosin, Take 2 mg by mouth., Taking  •  oxyCODONE immediate-release, TAKE 1 TABLET BY MOUTH EVERY 4 HOURS AS NEEDED FOR SEVERE PAIN FOR UP TO 7 DAYS, PRN  •  traMADol, 50 mg, Oral, Q4HRS PRN, PRN  •  diclofenac sodium, 4 g,  "Topical, 4X/DAY PRN, PRN  •  celecoxib, 100 mg, Oral, BID, Taking  •  polyethylene glycol/lytes, 17 g, Oral, DAILY, PRN  •  benazepril, 40 mg, Oral, QHS, Taking  •  doxazosin, 2 mg, Oral, QHS, Taking  •  atorvastatin, 20 mg, Oral, DAILY, Taking  •  spironolactone, 25 mg, Oral, DAILY, Taking  •  acetaminophen, 650 mg, Oral, Q6HRS PRN, PRN  •  omeprazole, 20 mg, Oral, DAILY, Taking  •  Diclofenac Sodium, APPLY 1 GRAM TOPICALLY THREE TIMES DAILY AS NEEDED FOR PAIN, PRN  •  Psyllium (METAMUCIL PO), Take  by mouth., PRN  •  B Complex-Folic Acid (B COMPLEX PLUS PO), 1 Tablet, Oral, DAILY, Taking  •  loratadine, 10 mg, Oral, PRN, PRN  •  vitamin D, 1,000 Units, Oral, DAILY, Taking  •  famotidine, 20 mg, Oral, QDAY PRN, Taking     Allergies   Allergen Reactions   • Crestor [Rosuvastatin Calcium]    • Flagyl [Metronidazole] Nausea   • Hydrochlorothiazide W-Triamterene      Other reaction(s): Other (See Comments)  hyponatremia   • Rosuvastatin      Other reaction(s): .Unknown     History reviewed. No pertinent family history.     Social History     Tobacco Use   • Smoking status: Never Smoker   • Smokeless tobacco: Never Used   Vaping Use   • Vaping Use: Never used   Substance Use Topics   • Alcohol use: Yes     Alcohol/week: 1.8 oz     Types: 3 Glasses of wine per week   • Drug use: Never   Review of Systems   Constitutional: Negative for chills and fever.   Respiratory: Negative for cough.    Cardiovascular: Negative for chest pain, palpitations, orthopnea, claudication and leg swelling.   Gastrointestinal: Negative for nausea.   Musculoskeletal: Negative for myalgias.   Neurological: Negative for weakness.   Endo/Heme/Allergies: Does not bruise/bleed easily.         Objective     Vitals:    06/30/22 1541 06/30/22 1544   BP: 133/67 (!) 145/81   BP Location: Left arm Left arm   Patient Position: Sitting Sitting   BP Cuff Size: Adult Adult   Pulse: 90 87   Weight: 70.6 kg (155 lb 11.2 oz)    Height: 1.676 m (5' 6\")       BP " Readings from Last 5 Encounters:   06/30/22 (!) 145/81   05/13/22 131/72   04/28/22 (!) 94/44   04/08/22 155/77   03/28/22 145/84      Body mass index is 25.13 kg/m².  Physical Exam  Constitutional:       Appearance: Normal appearance.   HENT:      Head: Normocephalic.   Eyes:      Extraocular Movements: Extraocular movements intact.      Conjunctiva/sclera: Conjunctivae normal.   Pulmonary:      Effort: Pulmonary effort is normal.   Musculoskeletal:      Cervical back: Normal range of motion.   Skin:     General: Skin is dry.      Coloration: Skin is not pale.      Findings: No rash.   Neurological:      General: No focal deficit present.      Mental Status: She is alert and oriented to person, place, and time.   Psychiatric:         Mood and Affect: Mood normal.         Behavior: Behavior normal.       Lab Results   Component Value Date    CHOLSTRLTOT 199 03/25/2022     (H) 03/25/2022    HDL 50 03/25/2022    TRIGLYCERIDE 138 03/25/2022      Lab Results   Component Value Date/Time    LIPOPROTA 120 (H) 01/26/2022 10:34 AM      No results found for: APOB           Lab Results   Component Value Date    SODIUM 139 06/28/2022    POTASSIUM 4.3 06/28/2022    CHLORIDE 105 06/28/2022    CO2 22 06/28/2022    GLUCOSE 95 06/28/2022    BUN 23 (H) 06/28/2022    CREATININE 1.02 06/28/2022    BUNCREATRAT 25 08/14/2020    IFAFRICA 58 (A) 01/26/2022    IFNOTAFR 48 (A) 01/26/2022        Lab Results   Component Value Date    WBC 8.0 04/13/2022    RBC 4.44 04/13/2022    HEMOGLOBIN 12.8 04/13/2022    HEMATOCRIT 40.3 04/13/2022    MCV 90.8 04/13/2022    MCH 28.8 04/13/2022    MCHC 31.8 (L) 04/13/2022    MPV 10.7 04/13/2022         Lab Results   Component Value Date/Time    MALBCRT see below 03/25/2022 11:21 AM    MICROALBUR <1.2 03/25/2022 11:21 AM        VASCULAR IMAGING:   Last EKG:   Results for orders placed or performed in visit on 02/16/22   EKG   Result Value Ref Range    Report       [facility]    Test  Date:  2022  Pt Name:    CLARITA BUTLER                Department: [facility]  MRN:        [id number]                      Room:  Gender:     Female                       Technician: VIC  :        1945                   Requested By:ANGEL BURGOS  Order #:    [id number]                    Reading MD: Angel Burgos MD    Measurements  Intervals                                Axis  Rate:       81                           P:          76  TN:         131                          QRS:        59  QRSD:       92                           T:          77  QT:         405  QTc:        470    Interpretive Statements  Sinus rhythm  Compared to ECG 10/06/2021 09:56:10  Electronically Signed On 2022 11:25:10 PST by Angel Burgos MD       Echo 3/23/22   The left ventricular ejection fraction is visually estimated to be 65%.  No significant valvular Doppler abnormality.  Normal estimated right atrial pressure, unable to estimate RVSP.   No prior study is available for comparison.     LLE VR duplex 3/31/22   No LEFT lower extremity DVT or SVT.   No deep venous reflux demonstrated.    RLE venous 22   No deep or superficial venous abnormalities in the right leg.            Medical Decision Making:  Today's Assessment / Status / Plan:     1. Primary hypertension     2. Primary hypercholesterolemia  Lipid Profile   3. PAF (paroxysmal atrial fibrillation) ([facility])     4. S/P ablation of atrial fibrillation     5. Leg swelling     6. Hypothyroidism, unspecified type     7. Long term (current) use of antithrombotics/antiplatelets     8. PMR (polymyalgia rheumatica) ([facility])     9. Elevated lipoprotein(a)       Patient Type: Primary Prevention    Etiology of Established CVD if Present:     1) Afib, s/p ablation - stable   - ongoing care with cardiology for decision on OAC, rate/rhythm control and monitoring    BLOOD PRESSURE MANAGEMENT:  ACC/AHA (2017) goal <130/80, consider <140/80 reasonable due to age and prior med ADRs   Home BP  at goal:  Most days   Office BP at goal:  Yes   24h ABPM: not ordered to date  Indications of end organ damage: None  Device candidate? No due to age   Plan:   Monitoring:   - continue home BP monitoring and excellent record keeping- verified home machine is reading accurate at last visit  - order 24h ABPM:  NO  - monitor lytes/gfr routinely   - contact office if BP consistently >140/>90 for discussion of tx adjustments   Medications:  - continue benazepril 40mg QHS   DHP-CCB: continue to hold amlodipine d/t LE swelling  Thiazide: avoid due to hypoNa+  - continue spironolactone 25mg daily  - continue doxazosin 2mg qHS; mild fatigue     LIPID MANAGEMENT:   Qualifies for Statin Therapy Based on 2018 ACC/AHA Guidelines: yes, Primary Severe HLD / FH (baseline LDL-C >190)  The 10-year ASCVD risk score (Antoine KIM Jr., et al., 2013) is: 28.7%  High   - entire family with severe HLD, no premature ASCVD though, possible FH  Lp(a) = 120   Major ASCVD events: None  High-risk conditions: N/A  Risk-enhancers: Persistently elevated LDL-C >159 and Lp(a) >50  Currently on statin: yes, did not tolerated atorva 40mg due to myalgias worsening  Treatment goals: reduce LDL-C >50%   At goal? No, 3/2022  Plan:   - reinforced ongoing TLC measures as noted   - cont fiber and Vit D  Meds:   - continue atorva 20mg daily, avoid higher due to myalgias   - consider start zetia 10mg if LDL above goal   - update labs     ANTITHROMBOTIC THERAPY: hold ASA for 1mo, use ponaris, then restart ASA 81mg daily - if recurrent epistaxis could consider QOD dosing or stopping     GLYCEMIC STATUS: Normal    LIFESTYLE INTERVENTIONS:    SMOKING:   reports that she has never smoked. She has never used smokeless tobacco.   - continued complete avoidance of all tobacco products     PHYSICAL ACTIVITY: encouraged healthy activity to improve CV fitness.  In general, targeting >150min/week of moderate-level activity or as much as tolerated    WEIGHT MANAGEMENT AND  NUTRITION: continue DASH diet      OTHER:    # PMR - if uncontrolled, will worsening inflam changes and increase risk for ASCVD, on chronic pred therapy   Defer to rheum    # nosebleeds - stable, resolved.   Unlikely HTN-associated, possible anitplat induced   - will hold ASA for a few weeks   - continue eval with ENT     Instructed to follow-up with PCP for remainder of adult medical needs: yes  We will partner with other providers in the management of established vascular disease and cardiometabolic risk factors.    Studies to Be Obtained: none   Labs to Be Obtained: as noted     Follow up in:  6mo     Kenn Bedoya M.D.  [facility]   [facility]   [phone]      CC:  Dr. Burgos

## 2022-07-26 ENCOUNTER — OFFICE VISIT (OUTPATIENT)
Dept: CARDIOLOGY | Facility: MEDICAL CENTER | Age: 77
End: 2022-07-26
Payer: MEDICARE

## 2022-07-26 VITALS
DIASTOLIC BLOOD PRESSURE: 86 MMHG | OXYGEN SATURATION: 97 % | RESPIRATION RATE: 16 BRPM | BODY MASS INDEX: 24.91 KG/M2 | HEIGHT: 66 IN | HEART RATE: 81 BPM | SYSTOLIC BLOOD PRESSURE: 142 MMHG | WEIGHT: 155 LBS

## 2022-07-26 DIAGNOSIS — E03.9 HYPOTHYROIDISM, UNSPECIFIED TYPE: ICD-10-CM

## 2022-07-26 DIAGNOSIS — Z86.79 S/P ABLATION OF ATRIAL FIBRILLATION: ICD-10-CM

## 2022-07-26 DIAGNOSIS — M35.3 PMR (POLYMYALGIA RHEUMATICA) (HCC): ICD-10-CM

## 2022-07-26 DIAGNOSIS — I10 PRIMARY HYPERTENSION: ICD-10-CM

## 2022-07-26 DIAGNOSIS — Z98.890 S/P ABLATION OF ATRIAL FIBRILLATION: ICD-10-CM

## 2022-07-26 DIAGNOSIS — E78.00 PRIMARY HYPERCHOLESTEROLEMIA: ICD-10-CM

## 2022-07-26 DIAGNOSIS — E78.2 MIXED HYPERLIPIDEMIA: Chronic | ICD-10-CM

## 2022-07-26 DIAGNOSIS — I48.0 PAF (PAROXYSMAL ATRIAL FIBRILLATION) (HCC): ICD-10-CM

## 2022-07-26 LAB — EKG IMPRESSION: NORMAL

## 2022-07-26 PROCEDURE — 99214 OFFICE O/P EST MOD 30 MIN: CPT | Performed by: INTERNAL MEDICINE

## 2022-07-26 PROCEDURE — 93000 ELECTROCARDIOGRAM COMPLETE: CPT | Performed by: INTERNAL MEDICINE

## 2022-07-26 RX ORDER — AMOXICILLIN 500 MG/1
CAPSULE ORAL
COMMUNITY
Start: 2022-06-22 | End: 2023-08-29

## 2022-07-26 ASSESSMENT — FIBROSIS 4 INDEX: FIB4 SCORE: 1.26

## 2022-07-26 NOTE — PROGRESS NOTES
Chief Complaint   Patient presents with   • Atrial Fibrillation     Fv Dx: PAF (paroxysmal atrial fibrillation)        Subjective     Tim Fraire is a 76 y.o. female who presents today with history of atrial fibrillation status post ablation no obvious recurrence.  Hypertension hyperlipidemia.  Has been off Lipitor and will go back on it.  Minimal palpitations.  Under more stress secondary to diagnosis of esophageal cancer in her son.    Past Medical History:   Diagnosis Date   • Arthritis     Polymyalgia Rheumatica; off prednisone since 2/7/22   • Benign essential HTN 5/29/2012   • DDD (degenerative disc disease), lumbar    • Heart burn    • High cholesterol    • Hyperlipidemia 5/29/2012   • PAF (paroxysmal atrial fibrillation) ([facility]) 5/29/2012    Ablations x 2   • Personal history of prior ablation treatment 5/29/2012   • PONV (postoperative nausea and vomiting)      Past Surgical History:   Procedure Laterality Date   • PB TOTAL KNEE ARTHROPLASTY Right 4/27/2022    Procedure: ARTHROPLASTY, KNEE, TOTAL;  Surgeon: Pedro Ibanez M.D.;  Location: SURGERY [facility];  Service: Orthopedics     No family history on file.  Social History     Socioeconomic History   • Marital status:      Spouse name: Not on file   • Number of children: Not on file   • Years of education: Not on file   • Highest education level: Not on file   Occupational History   • Not on file   Tobacco Use   • Smoking status: Never Smoker   • Smokeless tobacco: Never Used   Vaping Use   • Vaping Use: Never used   Substance and Sexual Activity   • Alcohol use: Yes     Alcohol/week: 1.8 oz     Types: 3 Glasses of wine per week   • Drug use: Never   • Sexual activity: Not on file   Other Topics Concern   • Not on file   Social History Narrative   • Not on file     Social Determinants of Health     Financial Resource Strain: Not on file   Food Insecurity: Not on file   Transportation Needs: Not on file   Physical Activity: Not on file    Stress: Not on file   Social Connections: Not on file   Intimate Partner Violence: Not on file   Housing Stability: Not on file     Allergies   Allergen Reactions   • Crestor [Rosuvastatin Calcium]    • Flagyl [Metronidazole] Nausea   • Hydrochlorothiazide W-Triamterene      Other reaction(s): Other (See Comments)  hyponatremia   • Rosuvastatin      Other reaction(s): .Unknown     Outpatient Encounter Medications as of 7/26/2022   Medication Sig Dispense Refill   • amoxicillin (AMOXIL) 500 MG Cap TAKE FOUR CAPSULES BY MOUTH ONE HOUR BEFORE APPOINTMENT     • oxyCODONE immediate-release (ROXICODONE) 5 MG Tab TAKE 1 TABLET BY MOUTH EVERY 4 HOURS AS NEEDED FOR SEVERE PAIN FOR UP TO 7 DAYS     • diclofenac sodium ([employer] DICLOFENAC SODIUM) 1 % Gel Apply 4 g topically 4 times a day as needed (as needed for knee or back pain (or hip pain)). 350 g 3   • celecoxib (CELEBREX) 100 MG Cap Take 1 Capsule by mouth 2 times a day. 60 Capsule 2   • polyethylene glycol/lytes (MIRALAX) 17 g Pack Take 1 Packet by mouth every day. 20 Each 3   • benazepril (LOTENSIN) 40 MG tablet Take 1 Tablet by mouth at bedtime. 90 Tablet 3   • doxazosin (CARDURA) 2 MG Tab Take 1 Tablet by mouth at bedtime. 90 Tablet 3   • atorvastatin (LIPITOR) 20 MG Tab Take 1 Tablet by mouth every day for 360 days. 90 Tablet 3   • spironolactone (ALDACTONE) 25 MG Tab Take 1 Tablet by mouth every day for 360 days. 90 Tablet 3   • acetaminophen (TYLENOL) 650 MG CR tablet Take 650 mg by mouth every 6 hours as needed.     • omeprazole (PRILOSEC) 20 MG delayed-release capsule Take 20 mg by mouth every day.     • Psyllium (METAMUCIL PO) Take  by mouth.     • B Complex-Folic Acid (B COMPLEX PLUS PO) Take 1 Tab by mouth every day.     • loratadine (CLARITIN) 10 MG Tab Take 10 mg by mouth as needed.     • vitamin D (CHOLECALCIFEROL) 1000 UNIT TABS Take 1,000 Units by mouth every day. D3     • famotidine (PEPCID) 20 MG Tab Take 20 mg by mouth 1 time daily as needed.     •  "[DISCONTINUED] doxazosin (CARDURA) 2 MG Tab Take 2 mg by mouth. (Patient not taking: Reported on 7/26/2022)     • [DISCONTINUED] Diclofenac Sodium 1 % Gel APPLY 1 GRAM TOPICALLY THREE TIMES DAILY AS NEEDED FOR PAIN (Patient not taking: Reported on 7/26/2022)       No facility-administered encounter medications on file as of 7/26/2022.     ROS           Objective     BP (!) 142/86 (BP Location: Left arm, Patient Position: Sitting, BP Cuff Size: Adult)   Pulse 81   Resp 16   Ht 1.676 m (5' 6\")   Wt 70.3 kg (155 lb)   SpO2 97%   BMI 25.02 kg/m²     Physical Exam  Constitutional:       Appearance: She is well-developed.   HENT:      Head: Normocephalic and atraumatic.   Eyes:      Pupils: Pupils are equal, round, and reactive to light.   Cardiovascular:      Rate and Rhythm: Normal rate and regular rhythm.      Heart sounds: Normal heart sounds. No murmur heard.    No friction rub. No gallop.   Pulmonary:      Effort: Pulmonary effort is normal.      Breath sounds: Normal breath sounds.   Abdominal:      General: Bowel sounds are normal.      Palpations: Abdomen is soft.   Musculoskeletal:         General: Normal range of motion.      Cervical back: Normal range of motion and neck supple.   Skin:     General: Skin is warm.   Neurological:      Mental Status: She is alert and oriented to person, place, and time.      Cranial Nerves: No cranial nerve deficit.   Psychiatric:         Behavior: Behavior normal.         Thought Content: Thought content normal.         Judgment: Judgment normal.                Assessment & Plan     1. PAF (paroxysmal atrial fibrillation) ([facility])  EKG   2. S/P ablation of atrial fibrillation     3. Hypothyroidism, unspecified type     4. PMR (polymyalgia rheumatica) ([facility])     5. Primary hypertension     6. Mixed hyperlipidemia     7. Primary hypercholesterolemia         Medical Decision Making: Today's Assessment/Status/Plan:   1.  Atrial arrhythmias no recurrence does have a Kardia.  2.  " Hypertension continue current regimen.  3.  Hyperlipidemia restart Lipitor.  4.  Follow-up with me in 6 months..

## 2022-10-05 ENCOUNTER — HOSPITAL ENCOUNTER (OUTPATIENT)
Dept: LAB | Facility: MEDICAL CENTER | Age: 77
End: 2022-10-05
Attending: FAMILY MEDICINE
Payer: MEDICARE

## 2022-10-05 ENCOUNTER — HOSPITAL ENCOUNTER (OUTPATIENT)
Dept: LAB | Facility: MEDICAL CENTER | Age: 77
End: 2022-10-05
Attending: NURSE PRACTITIONER
Payer: MEDICARE

## 2022-10-05 DIAGNOSIS — M70.61 TROCHANTERIC BURSITIS OF RIGHT HIP: ICD-10-CM

## 2022-10-05 DIAGNOSIS — E78.00 PRIMARY HYPERCHOLESTEROLEMIA: ICD-10-CM

## 2022-10-05 DIAGNOSIS — M35.3 POLYMYALGIA RHEUMATICA (HCC): ICD-10-CM

## 2022-10-05 DIAGNOSIS — G89.29 CHRONIC BILATERAL THORACIC BACK PAIN: ICD-10-CM

## 2022-10-05 DIAGNOSIS — M54.6 CHRONIC BILATERAL THORACIC BACK PAIN: ICD-10-CM

## 2022-10-05 DIAGNOSIS — Z79.52 LONG TERM (CURRENT) USE OF SYSTEMIC STEROIDS: ICD-10-CM

## 2022-10-05 LAB
ALBUMIN SERPL BCP-MCNC: 4 G/DL (ref 3.2–4.9)
ALBUMIN/GLOB SERPL: 1.3 G/DL
ALP SERPL-CCNC: 125 U/L (ref 30–99)
ALT SERPL-CCNC: 25 U/L (ref 2–50)
ANION GAP SERPL CALC-SCNC: 10 MMOL/L (ref 7–16)
AST SERPL-CCNC: 24 U/L (ref 12–45)
BASOPHILS # BLD AUTO: 1 % (ref 0–1.8)
BASOPHILS # BLD: 0.08 K/UL (ref 0–0.12)
BILIRUB SERPL-MCNC: 0.4 MG/DL (ref 0.1–1.5)
BUN SERPL-MCNC: 20 MG/DL (ref 8–22)
CALCIUM SERPL-MCNC: 9.2 MG/DL (ref 8.5–10.5)
CHLORIDE SERPL-SCNC: 103 MMOL/L (ref 96–112)
CHOLEST SERPL-MCNC: 188 MG/DL (ref 100–199)
CO2 SERPL-SCNC: 25 MMOL/L (ref 20–33)
CREAT SERPL-MCNC: 0.86 MG/DL (ref 0.5–1.4)
CRP SERPL HS-MCNC: <0.3 MG/DL (ref 0–0.75)
EOSINOPHIL # BLD AUTO: 0.33 K/UL (ref 0–0.51)
EOSINOPHIL NFR BLD: 4.1 % (ref 0–6.9)
ERYTHROCYTE [DISTWIDTH] IN BLOOD BY AUTOMATED COUNT: 49.2 FL (ref 35.9–50)
FASTING STATUS PATIENT QL REPORTED: NORMAL
GFR SERPLBLD CREATININE-BSD FMLA CKD-EPI: 70 ML/MIN/1.73 M 2
GLOBULIN SER CALC-MCNC: 3.1 G/DL (ref 1.9–3.5)
GLUCOSE SERPL-MCNC: 89 MG/DL (ref 65–99)
HCT VFR BLD AUTO: 42.3 % (ref 37–47)
HDLC SERPL-MCNC: 60 MG/DL
HGB BLD-MCNC: 13.5 G/DL (ref 12–16)
IMM GRANULOCYTES # BLD AUTO: 0.02 K/UL (ref 0–0.11)
IMM GRANULOCYTES NFR BLD AUTO: 0.3 % (ref 0–0.9)
LDLC SERPL CALC-MCNC: 107 MG/DL
LYMPHOCYTES # BLD AUTO: 1.41 K/UL (ref 1–4.8)
LYMPHOCYTES NFR BLD: 17.7 % (ref 22–41)
MCH RBC QN AUTO: 28.3 PG (ref 27–33)
MCHC RBC AUTO-ENTMCNC: 31.9 G/DL (ref 33.6–35)
MCV RBC AUTO: 88.7 FL (ref 81.4–97.8)
MONOCYTES # BLD AUTO: 0.54 K/UL (ref 0–0.85)
MONOCYTES NFR BLD AUTO: 6.8 % (ref 0–13.4)
NEUTROPHILS # BLD AUTO: 5.59 K/UL (ref 2–7.15)
NEUTROPHILS NFR BLD: 70.1 % (ref 44–72)
NRBC # BLD AUTO: 0 K/UL
NRBC BLD-RTO: 0 /100 WBC
PLATELET # BLD AUTO: 295 K/UL (ref 164–446)
PMV BLD AUTO: 11.6 FL (ref 9–12.9)
POTASSIUM SERPL-SCNC: 4.1 MMOL/L (ref 3.6–5.5)
PROT SERPL-MCNC: 7.1 G/DL (ref 6–8.2)
RBC # BLD AUTO: 4.77 M/UL (ref 4.2–5.4)
SODIUM SERPL-SCNC: 138 MMOL/L (ref 135–145)
TRIGL SERPL-MCNC: 103 MG/DL (ref 0–149)
WBC # BLD AUTO: 8 K/UL (ref 4.8–10.8)

## 2022-10-05 PROCEDURE — 86140 C-REACTIVE PROTEIN: CPT

## 2022-10-05 PROCEDURE — 36415 COLL VENOUS BLD VENIPUNCTURE: CPT

## 2022-10-05 PROCEDURE — 85652 RBC SED RATE AUTOMATED: CPT

## 2022-10-05 PROCEDURE — 80053 COMPREHEN METABOLIC PANEL: CPT

## 2022-10-05 PROCEDURE — 80061 LIPID PANEL: CPT

## 2022-10-05 PROCEDURE — 85025 COMPLETE CBC W/AUTO DIFF WBC: CPT

## 2022-10-06 LAB — ERYTHROCYTE [SEDIMENTATION RATE] IN BLOOD BY WESTERGREN METHOD: 7 MM/HOUR (ref 0–25)

## 2022-11-09 ENCOUNTER — PATIENT MESSAGE (OUTPATIENT)
Dept: HEALTH INFORMATION MANAGEMENT | Facility: OTHER | Age: 77
End: 2022-11-09

## 2023-01-24 ENCOUNTER — APPOINTMENT (OUTPATIENT)
Dept: VASCULAR LAB | Facility: MEDICAL CENTER | Age: 78
End: 2023-01-24
Payer: MEDICARE

## 2023-02-07 ENCOUNTER — OFFICE VISIT (OUTPATIENT)
Dept: VASCULAR LAB | Facility: MEDICAL CENTER | Age: 78
End: 2023-02-07
Attending: NURSE PRACTITIONER
Payer: MEDICARE

## 2023-02-07 VITALS
HEART RATE: 86 BPM | WEIGHT: 163.5 LBS | DIASTOLIC BLOOD PRESSURE: 84 MMHG | BODY MASS INDEX: 25.66 KG/M2 | RESPIRATION RATE: 14 BRPM | HEIGHT: 67 IN | SYSTOLIC BLOOD PRESSURE: 155 MMHG

## 2023-02-07 DIAGNOSIS — E78.00 PRIMARY HYPERCHOLESTEROLEMIA: ICD-10-CM

## 2023-02-07 DIAGNOSIS — I48.0 PAF (PAROXYSMAL ATRIAL FIBRILLATION) (HCC): Chronic | ICD-10-CM

## 2023-02-07 DIAGNOSIS — E78.2 MIXED HYPERLIPIDEMIA: Chronic | ICD-10-CM

## 2023-02-07 DIAGNOSIS — I10 PRIMARY HYPERTENSION: ICD-10-CM

## 2023-02-07 PROCEDURE — 99212 OFFICE O/P EST SF 10 MIN: CPT

## 2023-02-07 PROCEDURE — 99214 OFFICE O/P EST MOD 30 MIN: CPT | Performed by: NURSE PRACTITIONER

## 2023-02-07 RX ORDER — SPIRONOLACTONE 25 MG/1
12.5 TABLET ORAL DAILY
Qty: 45 TABLET | Refills: 3 | Status: SHIPPED | OUTPATIENT
Start: 2023-02-07 | End: 2024-02-02

## 2023-02-07 RX ORDER — ATORVASTATIN CALCIUM 40 MG/1
40 TABLET, FILM COATED ORAL DAILY
Qty: 90 TABLET | Refills: 3 | Status: SHIPPED | OUTPATIENT
Start: 2023-02-07 | End: 2023-04-26

## 2023-02-07 RX ORDER — BENAZEPRIL HYDROCHLORIDE 40 MG/1
40 TABLET ORAL
Qty: 90 TABLET | Refills: 3 | Status: SHIPPED | OUTPATIENT
Start: 2023-02-07 | End: 2024-02-01

## 2023-02-07 ASSESSMENT — ENCOUNTER SYMPTOMS
WEAKNESS: 0
BRUISES/BLEEDS EASILY: 0
PALPITATIONS: 0
ORTHOPNEA: 0
CHILLS: 0
MYALGIAS: 0
COUGH: 0
FEVER: 0
NAUSEA: 0
CLAUDICATION: 0

## 2023-02-07 ASSESSMENT — FIBROSIS 4 INDEX: FIB4 SCORE: 1.25

## 2023-02-07 NOTE — PROGRESS NOTES
FOLLOW UP VASCULAR MEDICINE VISIT    Chacha Fraire is a female  who presents 02/07/2023  for   Chief Complaint   Patient presents with    Follow-Up     initially referred by Angel Burgos M.D. for  eval and mgmt of HTN      Subjective      Interval hx/concerns: s/p R TKA, feeling well. Nosebleeds resolved.     HTN:  Interval hx/concerns: no issues    Home BP log:  avg low 130s/mid 80s but more frequent -150  Lifestyle factors:   Weight Change since last visit: up 5 lbs since last visit   BMI Readings from Last 5 Encounters:   02/07/23 25.61 kg/m²   11/29/22 24.75 kg/m²   10/07/22 24.86 kg/m²   08/23/22 24.90 kg/m²   07/26/22 25.02 kg/m²     Diet pattern changes since last visit: DASH   Daily salt intake estimate:  Low     EtOH: No  Exercise habits:  prior active, but has bursitis and limited   Perceived barriers to care: pain in R hip     Hyperlipidemia:  Stable, no changes.  Taking atorva 20mg, no myalgias     Afib:   Stable, no changes   Seeing Dr. Burgos (EP), s/p ablation.  No OAC therapy.  No current sx.    Has [employer] mobile to use for afib eval at home     Antiplatelet/anticoagulation: off ASA due to nosebleeds     CKD  Low GFR   Denies weight loss, poor appetite, SOB, fatigue, gross hematuria, cramping, HA  Not currently seeing nephrology      PMR:  Seeing rheum, off prednisone 2/2022.       Current Outpatient Medications:     atorvastatin, 40 mg, Oral, DAILY    benazepril, 40 mg, Oral, QHS    verapamil SR, 120 mg, Oral, DAILY    spironolactone, 12.5 mg, Oral, DAILY    amoxicillin, TAKE FOUR CAPSULES BY MOUTH ONE HOUR BEFORE APPOINTMENT, PRN    diclofenac sodium, 4 g, Topical, 4X/DAY PRN, PRN    celecoxib, 100 mg, Oral, BID, Taking    polyethylene glycol/lytes, 17 g, Oral, DAILY, PRN    acetaminophen, 650 mg, Oral, Q6HRS PRN, Taking    omeprazole, 20 mg, Oral, DAILY, Taking    Psyllium (METAMUCIL PO), Take  by mouth., Taking    B Complex-Folic Acid (B COMPLEX PLUS PO), 1 Tablet, Oral, DAILY, Taking     "loratadine, 10 mg, Oral, PRN, Taking    vitamin D, 1,000 Units, Oral, DAILY, Taking    famotidine, 20 mg, Oral, QDAY PRN, Taking     Allergies   Allergen Reactions    Crestor [Rosuvastatin Calcium]     Flagyl [Metronidazole] Nausea    Hydrochlorothiazide W-Triamterene      Other reaction(s): Other (See Comments)  hyponatremia    Rosuvastatin      Other reaction(s): .Unknown     No family history on file.     Social History     Tobacco Use    Smoking status: Never    Smokeless tobacco: Never   Vaping Use    Vaping Use: Never used   Substance Use Topics    Alcohol use: Yes     Alcohol/week: 1.8 oz     Types: 3 Glasses of wine per week    Drug use: Never   Review of Systems   Constitutional:  Negative for chills and fever.   Respiratory:  Negative for cough.    Cardiovascular:  Negative for chest pain, palpitations, orthopnea, claudication and leg swelling.   Gastrointestinal:  Negative for nausea.   Musculoskeletal:  Negative for myalgias.   Neurological:  Negative for weakness.   Endo/Heme/Allergies:  Does not bruise/bleed easily.       Objective     Vitals:    02/07/23 1421 02/07/23 1431   BP: (!) 153/80 (!) 155/84   BP Location: Left arm Left arm   Patient Position: Sitting Sitting   BP Cuff Size: Adult Adult   Pulse: 86    Resp: 14    Weight: 74.2 kg (163 lb 8 oz)    Height: 1.702 m (5' 7\")       BP Readings from Last 5 Encounters:   02/07/23 (!) 155/84   10/07/22 (!) 146/83   07/26/22 (!) 142/86   06/30/22 (!) 145/81   05/13/22 131/72      Body mass index is 25.61 kg/m².  Physical Exam  Constitutional:       Appearance: Normal appearance.   HENT:      Head: Normocephalic.   Eyes:      Extraocular Movements: Extraocular movements intact.      Conjunctiva/sclera: Conjunctivae normal.   Pulmonary:      Effort: Pulmonary effort is normal.   Musculoskeletal:      Cervical back: Normal range of motion.   Skin:     General: Skin is dry.      Coloration: Skin is not pale.      Findings: No rash.   Neurological:      " General: No focal deficit present.      Mental Status: She is alert and oriented to person, place, and time.   Psychiatric:         Mood and Affect: Mood normal.         Behavior: Behavior normal.     Lab Results   Component Value Date    CHOLSTRLTOT 188 10/05/2022     (H) 10/05/2022    HDL 60 10/05/2022    TRIGLYCERIDE 103 10/05/2022      Lab Results   Component Value Date/Time    LIPOPROTA 120 (H) 2022 10:34 AM      No results found for: APOB           Lab Results   Component Value Date    SODIUM 138 10/05/2022    POTASSIUM 4.1 10/05/2022    CHLORIDE 103 10/05/2022    CO2 25 10/05/2022    GLUCOSE 89 10/05/2022    BUN 20 10/05/2022    CREATININE 0.86 10/05/2022    GLOMRATE 48 (L) 2021    IFAFRICA 58 (A) 2022    IFNOTAFR 48 (A) 2022        Lab Results   Component Value Date    WBC 8.0 10/05/2022    RBC 4.77 10/05/2022    HEMOGLOBIN 13.5 10/05/2022    HEMATOCRIT 42.3 10/05/2022    MCV 88.7 10/05/2022    MCH 28.3 10/05/2022    MCHC 31.9 (L) 10/05/2022    MPV 11.6 10/05/2022         Lab Results   Component Value Date/Time    MALBCRT see below 2022 11:21 AM    MICROALBUR <1.2 2022 11:21 AM      VASCULAR IMAGING:   Last EKG:   Results for orders placed or performed in visit on 22   EKG   Result Value Ref Range    Report       [facility]    Test Date:  2022  Pt Name:    CLARITA BUTLER                Department: [facility]  MRN:        [id number]                      Room:  Gender:     Female                       Technician: VIC  :        [id number]                   Requested By:ANGEL BURGOS  Order #:    [id number]                    Reading MD: Angel Burgos MD    Measurements  Intervals                                Axis  Rate:       81                           P:          74  OK:         144                          QRS:        70  QRSD:       88                           T:          61  QT:         393  QTc:        457    Interpretive  Statements  Sinus rhythm  Compared to ECG 02/16/2022 10:42:25  Unchanged  Electronically Signed On 7- 10:50:46 PDT by Angel Burgos MD       Echo 3/23/22   The left ventricular ejection fraction is visually estimated to be 65%.  No significant valvular Doppler abnormality.  Normal estimated right atrial pressure, unable to estimate RVSP.   No prior study is available for comparison.     LLE VR duplex 3/31/22   No LEFT lower extremity DVT or SVT.   No deep venous reflux demonstrated.    RLE venous 5/13/22   No deep or superficial venous abnormalities in the right leg.        Medical Decision Making:  Today's Assessment / Status / Plan:     1. Mixed hyperlipidemia  Lipid Profile      2. PAF (paroxysmal atrial fibrillation) (HCC)  verapamil SR (CALAN-SR) 120 MG CR tablet      3. Primary hypercholesterolemia  atorvastatin (LIPITOR) 40 MG Tab      4. Primary hypertension  benazepril (LOTENSIN) 40 MG tablet    spironolactone (ALDACTONE) 25 MG Tab    Comp Metabolic Panel        Patient Type: Primary Prevention    Etiology of Established CVD if Present:     1) Afib, s/p ablation - stable   - ongoing care with cardiology for decision on OAC, rate/rhythm control and monitoring    BLOOD PRESSURE MANAGEMENT:  ACC/AHA (2017) goal <130/80, consider <140/80 reasonable due to age and prior med ADRs   Home BP at goal:  120-130/80's, but frequent -150   Office BP at goal:  Yes   24h ABPM: not ordered to date  Indications of end organ damage: None  Device candidate? No due to age   She feels stress in her personal life is contributing to high readings.    Plan:   Monitoring:   - continue home BP monitoring and excellent record keeping- verified home machine is reading accurate at last visit  - order 24h ABPM:  NO  - monitor lytes/gfr routinely   - contact office if BP consistently >140/>90 for discussion of tx adjustments   Medications:  - continue benazepril 40mg QHS   DHP-CCB: continue to hold amlodipine d/t LE  swelling  Thiazide: avoid due to hypoNa+  - decrease spironolactone to 12.5mg daily  - continue doxazosin 2mg qHS; mild fatigue   - start verapamil 120mg every AM    LIPID MANAGEMENT:   Qualifies for Statin Therapy Based on 2018 ACC/AHA Guidelines: yes, Primary Severe HLD / FH (baseline LDL-C >190)  The 10-year ASCVD risk score (Amanda JACK, et al., 2019) is: 35.6%  High   - entire family with severe HLD, no premature ASCVD though, possible FH  Lp(a) = 120   Major ASCVD events: None  High-risk conditions: N/A  Risk-enhancers: Persistently elevated LDL-C >159 and Lp(a) >50  Currently on statin: yes, did not tolerate atorva at higher doses due to myalgias worsening  Treatment goals: reduce LDL-C >50%   At goal? No, 3/2022  Plan:   - reinforced ongoing TLC measures as noted   - cont fiber and Vit D  Meds:   - increase atorva 40mg daily, avoid higher due to myalgias   - consider start zetia 10mg if LDL above goal   - update labs prior to next appt    ANTITHROMBOTIC THERAPY:  restart ASA- continue to monitor for nose bleeding.      GLYCEMIC STATUS: Normal    LIFESTYLE INTERVENTIONS:    SMOKING:   reports that she has never smoked. She has never used smokeless tobacco.   - continued complete avoidance of all tobacco products     PHYSICAL ACTIVITY: encouraged healthy activity to improve CV fitness.  In general, targeting >150min/week of moderate-level activity or as much as tolerated    WEIGHT MANAGEMENT AND NUTRITION: continue DASH diet      OTHER:    # PMR - if uncontrolled, will worsening inflam changes and increase risk for ASCVD, on chronic pred therapy   Defer to rheum    # nosebleeds - stable, resolved.   Unlikely HTN-associated, possible anitplat induced.  Had cauterization which has helped.   - restart ASA    - continue eval with ENT     Instructed to follow-up with PCP for remainder of adult medical needs: yes  We will partner with other providers in the management of established vascular disease and  cardiometabolic risk factors.    Studies to Be Obtained: none   Labs to Be Obtained: as noted above    Follow up in:  3 months     BOAZ Broussard  [facility]   [facility]   [phone]      CC:  Dr. Burgos

## 2023-04-21 ENCOUNTER — HOSPITAL ENCOUNTER (OUTPATIENT)
Dept: LAB | Facility: MEDICAL CENTER | Age: 78
End: 2023-04-21
Attending: NURSE PRACTITIONER
Payer: MEDICARE

## 2023-04-21 DIAGNOSIS — I10 PRIMARY HYPERTENSION: ICD-10-CM

## 2023-04-21 DIAGNOSIS — E78.2 MIXED HYPERLIPIDEMIA: Chronic | ICD-10-CM

## 2023-04-21 LAB
ALBUMIN SERPL BCP-MCNC: 4.2 G/DL (ref 3.2–4.9)
ALBUMIN/GLOB SERPL: 1.6 G/DL
ALP SERPL-CCNC: 109 U/L (ref 30–99)
ALT SERPL-CCNC: 20 U/L (ref 2–50)
ANION GAP SERPL CALC-SCNC: 12 MMOL/L (ref 7–16)
AST SERPL-CCNC: 24 U/L (ref 12–45)
BILIRUB SERPL-MCNC: 0.5 MG/DL (ref 0.1–1.5)
BUN SERPL-MCNC: 22 MG/DL (ref 8–22)
CALCIUM ALBUM COR SERPL-MCNC: 9 MG/DL (ref 8.5–10.5)
CALCIUM SERPL-MCNC: 9.2 MG/DL (ref 8.5–10.5)
CHLORIDE SERPL-SCNC: 105 MMOL/L (ref 96–112)
CHOLEST SERPL-MCNC: 200 MG/DL (ref 100–199)
CO2 SERPL-SCNC: 22 MMOL/L (ref 20–33)
CREAT SERPL-MCNC: 1.22 MG/DL (ref 0.5–1.4)
GFR SERPLBLD CREATININE-BSD FMLA CKD-EPI: 46 ML/MIN/1.73 M 2
GLOBULIN SER CALC-MCNC: 2.6 G/DL (ref 1.9–3.5)
GLUCOSE SERPL-MCNC: 91 MG/DL (ref 65–99)
HDLC SERPL-MCNC: 49 MG/DL
LDLC SERPL CALC-MCNC: 122 MG/DL
POTASSIUM SERPL-SCNC: 4.1 MMOL/L (ref 3.6–5.5)
PROT SERPL-MCNC: 6.8 G/DL (ref 6–8.2)
SODIUM SERPL-SCNC: 139 MMOL/L (ref 135–145)
TRIGL SERPL-MCNC: 144 MG/DL (ref 0–149)

## 2023-04-21 PROCEDURE — 80053 COMPREHEN METABOLIC PANEL: CPT

## 2023-04-21 PROCEDURE — 36415 COLL VENOUS BLD VENIPUNCTURE: CPT

## 2023-04-21 PROCEDURE — 80061 LIPID PANEL: CPT

## 2023-04-26 ENCOUNTER — OFFICE VISIT (OUTPATIENT)
Dept: VASCULAR LAB | Facility: MEDICAL CENTER | Age: 78
End: 2023-04-26
Attending: NURSE PRACTITIONER
Payer: MEDICARE

## 2023-04-26 VITALS
DIASTOLIC BLOOD PRESSURE: 78 MMHG | HEIGHT: 67 IN | HEART RATE: 78 BPM | WEIGHT: 163 LBS | SYSTOLIC BLOOD PRESSURE: 154 MMHG | BODY MASS INDEX: 25.58 KG/M2

## 2023-04-26 DIAGNOSIS — E78.2 MIXED HYPERLIPIDEMIA: ICD-10-CM

## 2023-04-26 DIAGNOSIS — I10 PRIMARY HYPERTENSION: ICD-10-CM

## 2023-04-26 DIAGNOSIS — Z79.02 LONG TERM (CURRENT) USE OF ANTITHROMBOTICS/ANTIPLATELETS: ICD-10-CM

## 2023-04-26 PROCEDURE — 99212 OFFICE O/P EST SF 10 MIN: CPT

## 2023-04-26 PROCEDURE — 99214 OFFICE O/P EST MOD 30 MIN: CPT | Performed by: NURSE PRACTITIONER

## 2023-04-26 RX ORDER — EZETIMIBE 10 MG/1
10 TABLET ORAL DAILY
Qty: 90 TABLET | Refills: 3 | Status: SHIPPED | OUTPATIENT
Start: 2023-04-26 | End: 2023-05-24

## 2023-04-26 RX ORDER — PRAVASTATIN SODIUM 20 MG
20 TABLET ORAL NIGHTLY
Qty: 90 TABLET | Refills: 3 | Status: SHIPPED | OUTPATIENT
Start: 2023-04-26 | End: 2023-05-24

## 2023-04-26 ASSESSMENT — ENCOUNTER SYMPTOMS
PALPITATIONS: 0
ORTHOPNEA: 0
MYALGIAS: 0
CLAUDICATION: 0
BRUISES/BLEEDS EASILY: 0
WEAKNESS: 0
CHILLS: 0
COUGH: 0
FEVER: 0
NAUSEA: 0

## 2023-04-26 ASSESSMENT — FIBROSIS 4 INDEX: FIB4 SCORE: 1.4

## 2023-04-26 NOTE — PROGRESS NOTES
FOLLOW UP VASCULAR MEDICINE VISIT    Chacha Fraire is a female  who presents 04/26/2023  for   Chief Complaint   Patient presents with    Follow-Up     initially referred by Angel Burgos M.D. for  eval and mgmt of HTN      Subjective      Interval hx/concerns:   Feeling very stressed due to her son's esophageal cancer which has been heartbreaking and difficult on the entire family.  She reports she is coping ok.     HTN:  Interval hx/concerns: no issues    Home BP log:  avg low 130s/mid 80s  Lifestyle factors:   Weight Change since last visit: up 5 lbs since last visit   BMI Readings from Last 5 Encounters:   04/26/23 25.53 kg/m²   02/07/23 25.61 kg/m²   11/29/22 24.75 kg/m²   10/07/22 24.86 kg/m²   08/23/22 24.90 kg/m²     Diet pattern changes since last visit: DASH   Daily salt intake estimate:  Low     EtOH: No  Exercise habits:  prior active, but has bursitis and limited   Perceived barriers to care: pain in R hip     Hyperlipidemia:  Stable, no changes.  Taking atorva 20mg, aching in arms and shoulders  Did not increase atorva to 40mg as previously discussed d/t muscular pain     Afib:   Stable, no changes   Seeing Dr. Burgos (EP), s/p ablation.  No OAC therapy.  No current sx.    Has [employer] mobile to use for afib eval at home     Antiplatelet/anticoagulation: off ASA due to nosebleeds but wants to restart     CKD  Low GFR   Denies weight loss, poor appetite, SOB, fatigue, gross hematuria, cramping, HA  Not currently seeing nephrology      PMR:  Seeing rheum, off prednisone 2/2022.       Current Outpatient Medications:     pravastatin, 20 mg, Oral, Nightly    ezetimibe, 10 mg, Oral, DAILY    benazepril, 40 mg, Oral, QHS, Taking    verapamil SR, 120 mg, Oral, DAILY, Taking    spironolactone, 12.5 mg, Oral, DAILY, Taking    amoxicillin, TAKE FOUR CAPSULES BY MOUTH ONE HOUR BEFORE APPOINTMENT, Taking    diclofenac sodium, 4 g, Topical, 4X/DAY PRN, Taking    celecoxib, 100 mg, Oral, BID, Taking    polyethylene  "glycol/lytes, 17 g, Oral, DAILY, Taking    acetaminophen, 650 mg, Oral, Q6HRS PRN, Taking    omeprazole, 20 mg, Oral, DAILY, Taking    Psyllium (METAMUCIL PO), Take  by mouth., Taking    B Complex-Folic Acid (B COMPLEX PLUS PO), 1 Tablet, Oral, DAILY, Taking    loratadine, 10 mg, Oral, PRN, Taking    vitamin D, 1,000 Units, Oral, DAILY, Taking    famotidine, 20 mg, Oral, QDAY PRN, Taking     Allergies   Allergen Reactions    Crestor [Rosuvastatin Calcium]     Flagyl [Metronidazole] Nausea    Hydrochlorothiazide W-Triamterene      Other reaction(s): Other (See Comments)  hyponatremia    Rosuvastatin      Other reaction(s): .Unknown     No family history on file.     Social History     Tobacco Use    Smoking status: Never    Smokeless tobacco: Never   Vaping Use    Vaping Use: Never used   Substance Use Topics    Alcohol use: Yes     Alcohol/week: 1.8 oz     Types: 3 Glasses of wine per week    Drug use: Never   Review of Systems   Constitutional:  Negative for chills and fever.   Respiratory:  Negative for cough.    Cardiovascular:  Negative for chest pain, palpitations, orthopnea, claudication and leg swelling.   Gastrointestinal:  Negative for nausea.   Musculoskeletal:  Negative for myalgias.   Neurological:  Negative for weakness.   Endo/Heme/Allergies:  Does not bruise/bleed easily.       Objective     Vitals:    04/26/23 1314 04/26/23 1317   BP: (!) 165/74 (!) 154/78   BP Location: Left arm Left arm   Patient Position: Sitting Sitting   BP Cuff Size: Adult Adult   Pulse: 91 78   Weight: 73.9 kg (163 lb)    Height: 1.702 m (5' 7\")       BP Readings from Last 5 Encounters:   04/26/23 (!) 154/78   02/07/23 (!) 155/84   10/07/22 (!) 146/83   07/26/22 (!) 142/86   06/30/22 (!) 145/81      Body mass index is 25.53 kg/m².  Physical Exam  Constitutional:       Appearance: Normal appearance.   HENT:      Head: Normocephalic.   Eyes:      Extraocular Movements: Extraocular movements intact.      Conjunctiva/sclera: " Conjunctivae normal.   Pulmonary:      Effort: Pulmonary effort is normal.   Musculoskeletal:      Cervical back: Normal range of motion.   Skin:     General: Skin is dry.      Coloration: Skin is not pale.      Findings: No rash.   Neurological:      General: No focal deficit present.      Mental Status: She is alert and oriented to person, place, and time.   Psychiatric:         Mood and Affect: Mood normal.         Behavior: Behavior normal.     Lab Results   Component Value Date    CHOLSTRLTOT 200 (H) 2023     (H) 2023    HDL 49 2023    TRIGLYCERIDE 144 2023      Lab Results   Component Value Date/Time    LIPOPROTA 120 (H) 2022 10:34 AM      No results found for: APOB           Lab Results   Component Value Date    SODIUM 139 2023    POTASSIUM 4.1 2023    CHLORIDE 105 2023    CO2 22 2023    GLUCOSE 91 2023    BUN 22 2023    CREATININE 1.22 2023    GLOMRATE 48 (L) 2021    IFAFRICA 58 (A) 2022    IFNOTAFR 48 (A) 2022        Lab Results   Component Value Date    WBC 8.0 10/05/2022    RBC 4.77 10/05/2022    HEMOGLOBIN 13.5 10/05/2022    HEMATOCRIT 42.3 10/05/2022    MCV 88.7 10/05/2022    MCH 28.3 10/05/2022    MCHC 31.9 (L) 10/05/2022    MPV 11.6 10/05/2022         Lab Results   Component Value Date/Time    MALBCRT see below 2022 11:21 AM    MICROALBUR <1.2 2022 11:21 AM      VASCULAR IMAGING:   Last EKG:   Results for orders placed or performed in visit on 22   EKG   Result Value Ref Range    Report       [facility]    Test Date:  2022  Pt Name:    CLARITA BUTLER                Department: [facility]  MRN:        [id number]                      Room:  Gender:     Female                       Technician: VIC  :        [id number]                   Requested By:ANGEL BURGOS  Order #:    [id number]                    Reading MD: Angel Burgos MD    Measurements  Intervals                                 Axis  Rate:       81                           P:          74  VT:         144                          QRS:        70  QRSD:       88                           T:          61  QT:         393  QTc:        457    Interpretive Statements  Sinus rhythm  Compared to ECG 02/16/2022 10:42:25  Unchanged  Electronically Signed On 7- 10:50:46 PDT by Angel Burgos MD       Echo 3/23/22   The left ventricular ejection fraction is visually estimated to be 65%.  No significant valvular Doppler abnormality.  Normal estimated right atrial pressure, unable to estimate RVSP.   No prior study is available for comparison.     LLE VR duplex 3/31/22   No LEFT lower extremity DVT or SVT.   No deep venous reflux demonstrated.    RLE venous 5/13/22   No deep or superficial venous abnormalities in the right leg.        Medical Decision Making:  Today's Assessment / Status / Plan:     1. Mixed hyperlipidemia  pravastatin (PRAVACHOL) 20 MG Tab    ezetimibe (ZETIA) 10 MG Tab    Lipid Profile      2. Long term (current) use of antithrombotics/antiplatelets        3. Primary hypertension          Patient Type: Primary Prevention    Etiology of Established CVD if Present:     1) Afib, s/p ablation - stable   - ongoing care with cardiology for decision on OAC, rate/rhythm control and monitoring    BLOOD PRESSURE MANAGEMENT:  ACC/AHA (2017) goal <130/80, consider <140/80 reasonable due to age and prior med ADRs   Home BP at goal:  120-130/80's, but frequent -150   Office BP at goal:  Yes   24h ABPM: not ordered to date  Indications of end organ damage: None  Device candidate? No due to age   She feels stress in her personal life is contributing to high readings.    Stopped doxazosin for unclear reasons  Plan:   Monitoring:   - continue home BP monitoring and excellent record keeping- verified home machine is reading accurate at last visit  - order 24h ABPM:  Consider at next appt   - monitor lytes/gfr routinely   -  "contact office if BP consistently >140/>90 for discussion of tx adjustments   Medications:  - continue benazepril 40mg QHS   DHP-CCB: continue to hold amlodipine d/t LE swelling  Thiazide: avoid due to hypoNa+  - continue spironolactone 12.5mg daily  - trial increase verapamil to 240mg every AM    LIPID MANAGEMENT:   Qualifies for Statin Therapy Based on 2018 ACC/AHA Guidelines: yes, Primary Severe HLD / FH (baseline LDL-C >190)  The 10-year ASCVD risk score (Amanda JACK, et al., 2019) is: 34.7%  High   - entire family with severe HLD, no premature ASCVD though, possible FH  Lp(a) = 120   Major ASCVD events: None  High-risk conditions: N/A  Risk-enhancers: Persistently elevated LDL-C >159 and Lp(a) >50  Currently on statin: yes, did not tolerate rosuvastatin or atorva at higher doses due to myalgias worsening  Treatment goals: reduce LDL-C >50%   At goal? No, 4/2023  Through shared decision making we have elected to stop statin therapy x 4 weeks to determine if her muscle aching in her arms and shoulders is related to atorvastatin.  She will take note of how she feels during this \"wash out\" period.  Then start trial of pravastatin and zetia as below.  Plan:   - reinforced ongoing TLC measures as noted   - cont fiber and Vit D  Meds:   - stop atorvastatin  - start pravastatin 10mg daily, after holding statin for 4 weeks  - start zetia 10mg daily  - update labs in prior to next appt    ANTITHROMBOTIC THERAPY:  restart ASA- continue to monitor for nose bleeding.      GLYCEMIC STATUS: Normal    LIFESTYLE INTERVENTIONS:    SMOKING:   reports that she has never smoked. She has never used smokeless tobacco.   - continued complete avoidance of all tobacco products     PHYSICAL ACTIVITY: encouraged healthy activity to improve CV fitness.  In general, targeting >150min/week of moderate-level activity or as much as tolerated    WEIGHT MANAGEMENT AND NUTRITION: continue DASH diet      OTHER:    # PMR - if uncontrolled, will " worsening inflam changes and increase risk for ASCVD, on chronic pred therapy   Defer to rheum    # nosebleeds - stable, resolved.   Unlikely HTN-associated, possible anitplat induced.  Had cauterization which has helped.   - restart ASA    - continue eval with ENT     Instructed to follow-up with PCP for remainder of adult medical needs: yes  We will partner with other providers in the management of established vascular disease and cardiometabolic risk factors.    Studies to Be Obtained: none   Labs to Be Obtained: lipid    Follow up in:  2 months     FATOU Broussard.  Vascular Medicine Clinic   [place] [facility]   [phone]

## 2023-05-24 ENCOUNTER — OFFICE VISIT (OUTPATIENT)
Dept: CARDIOLOGY | Facility: MEDICAL CENTER | Age: 78
End: 2023-05-24
Attending: INTERNAL MEDICINE
Payer: MEDICARE

## 2023-05-24 VITALS
SYSTOLIC BLOOD PRESSURE: 120 MMHG | OXYGEN SATURATION: 96 % | WEIGHT: 164 LBS | RESPIRATION RATE: 12 BRPM | HEART RATE: 71 BPM | BODY MASS INDEX: 25.74 KG/M2 | DIASTOLIC BLOOD PRESSURE: 84 MMHG | HEIGHT: 67 IN

## 2023-05-24 DIAGNOSIS — E03.9 HYPOTHYROIDISM, UNSPECIFIED TYPE: ICD-10-CM

## 2023-05-24 DIAGNOSIS — I48.0 PAF (PAROXYSMAL ATRIAL FIBRILLATION) (HCC): Chronic | ICD-10-CM

## 2023-05-24 DIAGNOSIS — E78.00 PRIMARY HYPERCHOLESTEROLEMIA: ICD-10-CM

## 2023-05-24 DIAGNOSIS — Z98.890 S/P ABLATION OF ATRIAL FIBRILLATION: ICD-10-CM

## 2023-05-24 DIAGNOSIS — Z86.79 S/P ABLATION OF ATRIAL FIBRILLATION: ICD-10-CM

## 2023-05-24 DIAGNOSIS — I10 PRIMARY HYPERTENSION: ICD-10-CM

## 2023-05-24 DIAGNOSIS — M35.3 PMR (POLYMYALGIA RHEUMATICA) (HCC): ICD-10-CM

## 2023-05-24 LAB — EKG IMPRESSION: NORMAL

## 2023-05-24 PROCEDURE — 99213 OFFICE O/P EST LOW 20 MIN: CPT | Performed by: INTERNAL MEDICINE

## 2023-05-24 PROCEDURE — 3079F DIAST BP 80-89 MM HG: CPT | Performed by: INTERNAL MEDICINE

## 2023-05-24 PROCEDURE — 93005 ELECTROCARDIOGRAM TRACING: CPT | Performed by: INTERNAL MEDICINE

## 2023-05-24 PROCEDURE — 3074F SYST BP LT 130 MM HG: CPT | Performed by: INTERNAL MEDICINE

## 2023-05-24 PROCEDURE — 93010 ELECTROCARDIOGRAM REPORT: CPT | Performed by: INTERNAL MEDICINE

## 2023-05-24 PROCEDURE — 99214 OFFICE O/P EST MOD 30 MIN: CPT | Mod: 25 | Performed by: INTERNAL MEDICINE

## 2023-05-24 RX ORDER — ATORVASTATIN CALCIUM 20 MG/1
20 TABLET, FILM COATED ORAL NIGHTLY
Qty: 90 TABLET | Refills: 3 | Status: SHIPPED | OUTPATIENT
Start: 2023-05-24 | End: 2023-06-27

## 2023-05-24 ASSESSMENT — FIBROSIS 4 INDEX: FIB4 SCORE: 1.4

## 2023-05-24 NOTE — PROGRESS NOTES
Chief Complaint   Patient presents with    Atrial Fibrillation     F/V Dx: PAF (paroxysmal atrial fibrillation) (HCC)       Subjective     Tim Fraire is a 77 y.o. female who presents today with history of atrial fibrillation status post ablation, no recurrence.  History of PMR.  Feels like she has PMR again but sed rate and CRP are normal.  Has similar symptoms.  Did come off Lipitor but no improvement.  No palpitations blood pressures been better controlled.  Under stress.  Son  with esophageal cancer.    Past Medical History:   Diagnosis Date    Arthritis     Polymyalgia Rheumatica; off prednisone since 2/7/22    Benign essential HTN 5/29/2012    DDD (degenerative disc disease), lumbar     Heart burn     High cholesterol     Hyperlipidemia 5/29/2012    PAF (paroxysmal atrial fibrillation) (HCC) 5/29/2012    Ablations x 2    Personal history of prior ablation treatment 5/29/2012    PONV (postoperative nausea and vomiting)      Past Surgical History:   Procedure Laterality Date    PB TOTAL KNEE ARTHROPLASTY Right 4/27/2022    Procedure: ARTHROPLASTY, KNEE, TOTAL;  Surgeon: Pedro Ibanez M.D.;  Location: SURGERY [facility];  Service: Orthopedics     History reviewed. No pertinent family history.  Social History     Socioeconomic History    Marital status:      Spouse name: Not on file    Number of children: Not on file    Years of education: Not on file    Highest education level: Not on file   Occupational History    Not on file   Tobacco Use    Smoking status: Never    Smokeless tobacco: Never   Vaping Use    Vaping Use: Never used   Substance and Sexual Activity    Alcohol use: Yes     Alcohol/week: 1.8 oz     Types: 3 Glasses of wine per week    Drug use: Never    Sexual activity: Not on file   Other Topics Concern    Not on file   Social History Narrative    Not on file     Social Determinants of Health     Financial Resource Strain: Not on file   Food Insecurity: Not on file    Transportation Needs: Not on file   Physical Activity: Not on file   Stress: Not on file   Social Connections: Not on file   Intimate Partner Violence: Not on file   Housing Stability: Not on file     Allergies   Allergen Reactions    Crestor [Rosuvastatin Calcium]     Flagyl [Metronidazole] Nausea    Hydrochlorothiazide W-Triamterene      Other reaction(s): Other (See Comments)  hyponatremia    Rosuvastatin      Other reaction(s): .Unknown     Outpatient Encounter Medications as of 5/24/2023   Medication Sig Dispense Refill    atorvastatin (LIPITOR) 20 MG Tab Take 1 Tablet by mouth every evening. 90 Tablet 3    benazepril (LOTENSIN) 40 MG tablet Take 1 Tablet by mouth at bedtime. 90 Tablet 3    verapamil SR (CALAN-SR) 120 MG CR tablet Take 1 Tablet by mouth every day. 90 Tablet 3    spironolactone (ALDACTONE) 25 MG Tab Take 0.5 Tablets by mouth every day for 360 days. 45 Tablet 3    amoxicillin (AMOXIL) 500 MG Cap TAKE FOUR CAPSULES BY MOUTH ONE HOUR BEFORE APPOINTMENT      diclofenac sodium ([employer] DICLOFENAC SODIUM) 1 % Gel Apply 4 g topically 4 times a day as needed (as needed for knee or back pain (or hip pain)). 350 g 3    celecoxib (CELEBREX) 100 MG Cap Take 1 Capsule by mouth 2 times a day. 60 Capsule 2    polyethylene glycol/lytes (MIRALAX) 17 g Pack Take 1 Packet by mouth every day. 20 Each 3    acetaminophen (TYLENOL) 650 MG CR tablet Take 650 mg by mouth every 6 hours as needed.      omeprazole (PRILOSEC) 20 MG delayed-release capsule Take 20 mg by mouth every day.      Psyllium (METAMUCIL PO) Take  by mouth.      B Complex-Folic Acid (B COMPLEX PLUS PO) Take 1 Tab by mouth every day.      loratadine (CLARITIN) 10 MG Tab Take 10 mg by mouth as needed.      vitamin D (CHOLECALCIFEROL) 1000 UNIT TABS Take 1,000 Units by mouth every day. D3      famotidine (PEPCID) 20 MG Tab Take 20 mg by mouth 1 time daily as needed.      [DISCONTINUED] pravastatin (PRAVACHOL) 20 MG Tab Take 1 Tablet by mouth every evening.  "90 Tablet 3    [DISCONTINUED] ezetimibe (ZETIA) 10 MG Tab Take 1 Tablet by mouth every day. 90 Tablet 3     No facility-administered encounter medications on file as of 5/24/2023.     ROS           Objective     /84 (BP Location: Left arm, Patient Position: Sitting, BP Cuff Size: Adult)   Pulse 71   Resp 12   Ht 1.702 m (5' 7\")   Wt 74.4 kg (164 lb)   SpO2 96%   BMI 25.69 kg/m²     Physical Exam  Constitutional:       Appearance: She is well-developed.   HENT:      Head: Normocephalic and atraumatic.   Eyes:      Pupils: Pupils are equal, round, and reactive to light.   Cardiovascular:      Rate and Rhythm: Normal rate and regular rhythm.      Heart sounds: Normal heart sounds. No murmur heard.     No friction rub. No gallop.   Pulmonary:      Effort: Pulmonary effort is normal.      Breath sounds: Normal breath sounds.   Abdominal:      General: Bowel sounds are normal.      Palpations: Abdomen is soft.   Musculoskeletal:         General: Normal range of motion.      Cervical back: Normal range of motion and neck supple.   Skin:     General: Skin is warm.   Neurological:      Mental Status: She is alert and oriented to person, place, and time.      Cranial Nerves: No cranial nerve deficit.   Psychiatric:         Behavior: Behavior normal.         Thought Content: Thought content normal.         Judgment: Judgment normal.                Assessment & Plan     1. PAF (paroxysmal atrial fibrillation) ([facility])  EKG      2. PMR (polymyalgia rheumatica) ([facility])        3. S/P ablation of atrial fibrillation        4. Primary hypertension        5. Primary hypercholesterolemia  atorvastatin (LIPITOR) 20 MG Tab      6. Hypothyroidism, unspecified type            Medical Decision Making: Today's Assessment/Status/Plan:     1.  Atrial fibrillation no recurrence.  Previous ablation.  2.  Hypertension continue current regimen.  3.  PMR.  Normal sed rate.  Patient will call rheumatology.  4.  Hypercholesterolemia restart " Lipitor.  5.  Follow-up in 6 months.

## 2023-06-21 ENCOUNTER — HOSPITAL ENCOUNTER (OUTPATIENT)
Dept: LAB | Facility: MEDICAL CENTER | Age: 78
End: 2023-06-21
Attending: NURSE PRACTITIONER
Payer: MEDICARE

## 2023-06-21 DIAGNOSIS — Z13.820 SCREENING FOR OSTEOPOROSIS: ICD-10-CM

## 2023-06-21 DIAGNOSIS — Z79.52 LONG TERM (CURRENT) USE OF SYSTEMIC STEROIDS: ICD-10-CM

## 2023-06-21 DIAGNOSIS — M35.3 POLYMYALGIA RHEUMATICA (HCC): ICD-10-CM

## 2023-06-21 DIAGNOSIS — E78.2 MIXED HYPERLIPIDEMIA: ICD-10-CM

## 2023-06-21 DIAGNOSIS — Z78.0 POSTMENOPAUSAL: ICD-10-CM

## 2023-06-21 LAB
25(OH)D3 SERPL-MCNC: 50 NG/ML (ref 30–100)
ALBUMIN SERPL BCP-MCNC: 4.6 G/DL (ref 3.2–4.9)
ALBUMIN/GLOB SERPL: 1.9 G/DL
ALP SERPL-CCNC: 107 U/L (ref 30–99)
ALT SERPL-CCNC: 20 U/L (ref 2–50)
ANION GAP SERPL CALC-SCNC: 13 MMOL/L (ref 7–16)
AST SERPL-CCNC: 24 U/L (ref 12–45)
BASOPHILS # BLD AUTO: 1 % (ref 0–1.8)
BASOPHILS # BLD: 0.08 K/UL (ref 0–0.12)
BILIRUB SERPL-MCNC: 0.4 MG/DL (ref 0.1–1.5)
BUN SERPL-MCNC: 21 MG/DL (ref 8–22)
CALCIUM ALBUM COR SERPL-MCNC: 9 MG/DL (ref 8.5–10.5)
CALCIUM SERPL-MCNC: 9.5 MG/DL (ref 8.5–10.5)
CHLORIDE SERPL-SCNC: 110 MMOL/L (ref 96–112)
CHOLEST SERPL-MCNC: 220 MG/DL (ref 100–199)
CO2 SERPL-SCNC: 21 MMOL/L (ref 20–33)
CREAT SERPL-MCNC: 1.13 MG/DL (ref 0.5–1.4)
CRP SERPL HS-MCNC: 0.49 MG/DL (ref 0–0.75)
EOSINOPHIL # BLD AUTO: 0.28 K/UL (ref 0–0.51)
EOSINOPHIL NFR BLD: 3.5 % (ref 0–6.9)
ERYTHROCYTE [DISTWIDTH] IN BLOOD BY AUTOMATED COUNT: 43 FL (ref 35.9–50)
ERYTHROCYTE [SEDIMENTATION RATE] IN BLOOD BY WESTERGREN METHOD: 10 MM/HOUR (ref 0–25)
GFR SERPLBLD CREATININE-BSD FMLA CKD-EPI: 50 ML/MIN/1.73 M 2
GLOBULIN SER CALC-MCNC: 2.4 G/DL (ref 1.9–3.5)
GLUCOSE SERPL-MCNC: 105 MG/DL (ref 65–99)
HCT VFR BLD AUTO: 43.6 % (ref 37–47)
HDLC SERPL-MCNC: 53 MG/DL
HGB BLD-MCNC: 14 G/DL (ref 12–16)
IMM GRANULOCYTES # BLD AUTO: 0.02 K/UL (ref 0–0.11)
IMM GRANULOCYTES NFR BLD AUTO: 0.2 % (ref 0–0.9)
LDLC SERPL CALC-MCNC: 146 MG/DL
LYMPHOCYTES # BLD AUTO: 1.19 K/UL (ref 1–4.8)
LYMPHOCYTES NFR BLD: 14.7 % (ref 22–41)
MCH RBC QN AUTO: 28.9 PG (ref 27–33)
MCHC RBC AUTO-ENTMCNC: 32.1 G/DL (ref 32.2–35.5)
MCV RBC AUTO: 89.9 FL (ref 81.4–97.8)
MONOCYTES # BLD AUTO: 0.64 K/UL (ref 0–0.85)
MONOCYTES NFR BLD AUTO: 7.9 % (ref 0–13.4)
NEUTROPHILS # BLD AUTO: 5.87 K/UL (ref 1.82–7.42)
NEUTROPHILS NFR BLD: 72.7 % (ref 44–72)
NRBC # BLD AUTO: 0 K/UL
NRBC BLD-RTO: 0 /100 WBC (ref 0–0.2)
PLATELET # BLD AUTO: 277 K/UL (ref 164–446)
PMV BLD AUTO: 11.2 FL (ref 9–12.9)
POTASSIUM SERPL-SCNC: 4.7 MMOL/L (ref 3.6–5.5)
PROT SERPL-MCNC: 7 G/DL (ref 6–8.2)
RBC # BLD AUTO: 4.85 M/UL (ref 4.2–5.4)
SODIUM SERPL-SCNC: 144 MMOL/L (ref 135–145)
TRIGL SERPL-MCNC: 105 MG/DL (ref 0–149)
WBC # BLD AUTO: 8.1 K/UL (ref 4.8–10.8)

## 2023-06-21 PROCEDURE — 86140 C-REACTIVE PROTEIN: CPT

## 2023-06-21 PROCEDURE — 82306 VITAMIN D 25 HYDROXY: CPT

## 2023-06-21 PROCEDURE — 85652 RBC SED RATE AUTOMATED: CPT

## 2023-06-21 PROCEDURE — 36415 COLL VENOUS BLD VENIPUNCTURE: CPT

## 2023-06-21 PROCEDURE — 85025 COMPLETE CBC W/AUTO DIFF WBC: CPT

## 2023-06-21 PROCEDURE — 80053 COMPREHEN METABOLIC PANEL: CPT

## 2023-06-21 PROCEDURE — 80061 LIPID PANEL: CPT

## 2023-06-27 ENCOUNTER — OFFICE VISIT (OUTPATIENT)
Dept: VASCULAR LAB | Facility: MEDICAL CENTER | Age: 78
End: 2023-06-27
Attending: NURSE PRACTITIONER
Payer: MEDICARE

## 2023-06-27 VITALS
HEIGHT: 67 IN | WEIGHT: 159.9 LBS | SYSTOLIC BLOOD PRESSURE: 122 MMHG | HEART RATE: 69 BPM | DIASTOLIC BLOOD PRESSURE: 73 MMHG | BODY MASS INDEX: 25.1 KG/M2

## 2023-06-27 DIAGNOSIS — N18.31 STAGE 3A CHRONIC KIDNEY DISEASE: ICD-10-CM

## 2023-06-27 DIAGNOSIS — E78.2 MIXED HYPERLIPIDEMIA: ICD-10-CM

## 2023-06-27 DIAGNOSIS — I10 PRIMARY HYPERTENSION: ICD-10-CM

## 2023-06-27 DIAGNOSIS — Z79.02 LONG TERM (CURRENT) USE OF ANTITHROMBOTICS/ANTIPLATELETS: ICD-10-CM

## 2023-06-27 PROCEDURE — 99214 OFFICE O/P EST MOD 30 MIN: CPT | Performed by: NURSE PRACTITIONER

## 2023-06-27 PROCEDURE — 3074F SYST BP LT 130 MM HG: CPT | Performed by: NURSE PRACTITIONER

## 2023-06-27 PROCEDURE — 3078F DIAST BP <80 MM HG: CPT | Performed by: NURSE PRACTITIONER

## 2023-06-27 PROCEDURE — 99212 OFFICE O/P EST SF 10 MIN: CPT

## 2023-06-27 RX ORDER — ATORVASTATIN CALCIUM 20 MG/1
20 TABLET, FILM COATED ORAL NIGHTLY
Qty: 90 TABLET | Refills: 3 | Status: SHIPPED | OUTPATIENT
Start: 2023-06-27

## 2023-06-27 RX ORDER — EZETIMIBE 10 MG/1
10 TABLET ORAL DAILY
COMMUNITY
End: 2023-06-29

## 2023-06-27 RX ORDER — ASPIRIN 81 MG/1
81 TABLET ORAL DAILY
COMMUNITY

## 2023-06-27 ASSESSMENT — FIBROSIS 4 INDEX: FIB4 SCORE: 1.49

## 2023-06-27 NOTE — PROGRESS NOTES
FOLLOW UP VASCULAR MEDICINE VISIT    Chacha Fraire is a female who presents 6/27/2023  for   Chief Complaint   Patient presents with    Follow-Up     initially referred by Angel Burgos M.D. for  eval and mgmt of HTN      Subjective      Interval hx/concerns:   Feeling very stressed due to her son's esophageal cancer which has been heartbreaking and difficult on the entire family. She also has been having PMR like symptoms; muscle aches and pains in her shoulders, back, neck.  Is following closely with rheum, has f/u this Thur.  Muscle pain was not improved off atorva; never started prava and or zetia    HTN:  Interval hx/concerns: no issues    Home BP log:  no log for May; has been in so much pain and had priorities in caring for her son.  Log prior to May averages out to about 120s/70s.  She did NOT increase verapamil as previously instructed  Lifestyle factors:   Weight Change: -5lbs  BMI Readings from Last 5 Encounters:   05/24/23 25.69 kg/m²   04/26/23 25.53 kg/m²   02/07/23 25.61 kg/m²   11/29/22 24.75 kg/m²   10/07/22 24.86 kg/m²     Diet pattern changes since last visit: DASH   Daily salt intake estimate:  Low     EtOH: No  Exercise habits:  prior active, but limited d/t joint pain  Perceived barriers to care: joint pain, caregiving    Hyperlipidemia:  Stable, no changes.  Started back on atorva 20mg last mo at cards visit    Afib:   Stable, no changes   Seeing Dr. Burgos (EP), s/p ablation.  No OAC therapy.  No current sx.    Has [employer] mobile to use for afib eval at home     Antiplatelet/anticoagulation:  asa?    CKD  Denies poor appetite, SOB,      PMR:  Seeing rheum; continues to have diffuse pain    Current Outpatient Medications:     atorvastatin, 20 mg, Oral, Nightly    benazepril, 40 mg, Oral, QHS    verapamil SR, 120 mg, Oral, DAILY    spironolactone, 12.5 mg, Oral, DAILY    amoxicillin, TAKE FOUR CAPSULES BY MOUTH ONE HOUR BEFORE APPOINTMENT    diclofenac sodium, 4 g, Topical, 4X/DAY PRN     celecoxib, 100 mg, Oral, BID    polyethylene glycol/lytes, 17 g, Oral, DAILY    acetaminophen, 650 mg, Oral, Q6HRS PRN    omeprazole, 20 mg, Oral, DAILY    Psyllium (METAMUCIL PO), Take  by mouth.    B Complex-Folic Acid (B COMPLEX PLUS PO), 1 Tablet, Oral, DAILY    loratadine, 10 mg, Oral, PRN    vitamin D, 1,000 Units, Oral, DAILY    famotidine, 20 mg, Oral, QDAY PRN     Allergies   Allergen Reactions    Crestor [Rosuvastatin Calcium]     Flagyl [Metronidazole] Nausea    Hydrochlorothiazide W-Triamterene      Other reaction(s): Other (See Comments)  hyponatremia    Rosuvastatin      Other reaction(s): .Unknown     No family history on file.     Social History     Tobacco Use    Smoking status: Never    Smokeless tobacco: Never   Vaping Use    Vaping Use: Never used   Substance Use Topics    Alcohol use: Yes     Alcohol/week: 1.8 oz     Types: 3 Glasses of wine per week    Drug use: Never         Objective     There were no vitals filed for this visit.     BP Readings from Last 5 Encounters:   05/24/23 120/84   04/26/23 (!) 154/78   02/07/23 (!) 155/84   10/07/22 (!) 146/83   07/26/22 (!) 142/86      There is no height or weight on file to calculate BMI.  Physical Exam  Constitutional:       Appearance: Normal appearance.   Pulmonary:      Effort: Pulmonary effort is normal.   Musculoskeletal:         General: No swelling. Normal range of motion.      Cervical back: Normal range of motion.   Skin:     General: Skin is dry.      Coloration: Skin is not pale.      Findings: No rash.   Neurological:      General: No focal deficit present.      Mental Status: She is alert and oriented to person, place, and time.   Psychiatric:         Mood and Affect: Mood normal.         Behavior: Behavior normal.       Lab Results   Component Value Date    CHOLSTRLTOT 220 (H) 06/21/2023     (H) 06/21/2023    HDL 53 06/21/2023    TRIGLYCERIDE 105 06/21/2023      Lab Results   Component Value Date/Time    LIPOPROTA 120 (H)  2022 10:34 AM      No results found for: APOB           Lab Results   Component Value Date    SODIUM 144 2023    POTASSIUM 4.7 2023    CHLORIDE 110 2023    CO2 21 2023    GLUCOSE 105 (H) 2023    BUN 21 2023    CREATININE 1.13 2023    GLOMRATE 48 (L) 2021    IFAFRICA 58 (A) 2022    IFNOTAFR 48 (A) 2022        Lab Results   Component Value Date    WBC 8.1 2023    RBC 4.85 2023    HEMOGLOBIN 14.0 2023    HEMATOCRIT 43.6 2023    MCV 89.9 2023    MCH 28.9 2023    MCHC 32.1 (L) 2023    MPV 11.2 2023         Lab Results   Component Value Date/Time    MALBCRT see below 2022 11:21 AM    MICROALBUR <1.2 2022 11:21 AM      VASCULAR IMAGING:   Last EKG:   Results for orders placed or performed in visit on 23   EKG   Result Value Ref Range    Report       [facility]    Test Date:  2023  Pt Name:    CLARITA BUTLER                Department: [facility]  MRN:        [id number]                      Room:  Gender:     Female                       Technician: YM  :        [id number]                   Requested By:ANGEL BURGOS  Order #:    [id number]                    Reading MD: Angel Burgos MD    Measurements  Intervals                                Axis  Rate:       73                           P:          79  ND:         158                          QRS:        79  QRSD:       79                           T:          42  QT:         426  QTc:        469    Interpretive Statements  Sinus rhythm  Compared to ECG 2022 11:31:05  Unchanged  Electronically Signed On 2023 12:56:02 PDT by Angel Burgos MD       Echo 3/23/22   The left ventricular ejection fraction is visually estimated to be 65%.  No significant valvular Doppler abnormality.  Normal estimated right atrial pressure, unable to estimate RVSP.   No prior study is available for comparison.     LLE VR duplex 3/31/22   No  LEFT lower extremity DVT or SVT.   No deep venous reflux demonstrated.    RLE venous 5/13/22   No deep or superficial venous abnormalities in the right leg.        Medical Decision Making:  Today's Assessment / Status / Plan:     1. Mixed hyperlipidemia        2. Long term (current) use of antithrombotics/antiplatelets        3. Primary hypertension        4. Stage 3a chronic kidney disease (HCC)          Patient Type: Primary Prevention    Etiology of Established CVD if Present:     1) Afib, s/p ablation - stable   - ongoing care with cardiology for decision on OAC, rate/rhythm control and monitoring    BLOOD PRESSURE MANAGEMENT:  ACC/AHA (2017) goal <130/80, consider <140/80 reasonable due to age and prior med ADRs   Home BP at goal:  110-140s/70s, averaging out to 120s/70s  Office BP at goal:  Yes   24h ABPM: not ordered to date  Stopped doxazosin in the past for unclear reasons  Plan:   Monitoring:   - continue home BP monitoring and excellent record keeping- verified home machine is reading accurate at last visit  - order 24h ABPM:  Could consider in the future  - monitor lytes/gfr routinely   - contact office if BP consistently >140/>90 for discussion of tx adjustments   Medications:  - continue benazepril 40mg QHS   DHP-CCB: continue to hold amlodipine d/t LE swelling  Thiazide: avoid due to hypoNa+  - continue spironolactone 12.5mg daily  - continue verapamil 120mg every AM    LIPID MANAGEMENT:   Qualifies for Statin Therapy Based on 2018 ACC/AHA Guidelines: yes, Primary Severe HLD / FH (baseline LDL-C >190)  The 10-year ASCVD risk score (Amanda DK, et al., 2019) is: 24.2%  High   - entire family with severe HLD, no premature ASCVD though, possible FH  Lp(a) = 120   Major ASCVD events: None  High-risk conditions: N/A  Risk-enhancers: Persistently elevated LDL-C >159 and Lp(a) >50  Currently on statin: yes, did not tolerate rosuvastatin or atorva at higher doses due to myalgias worsening  Treatment goals:  reduce LDL-C >50%   At goal? No, 4/2023  Was taken off atorva at last vas visit d/t muscle pain and trial of prava + zetia, however, cardiology switched pt back to atorva last mo.  She had NO relief of pain while off atorva; never stated prava and zetia.  Most recent labs were done off statin  Plan:   - reinforced ongoing TLC measures as noted   - cont fiber and Vit D  Meds:   - continue atorvastatin 20mg daily  - update labs in prior to next appt + CPK    ANTITHROMBOTIC THERAPY:  continue asa 81mg daily but ok to take qod d/t consistent epistaxis with daily dosing    GLYCEMIC STATUS: Normal    LIFESTYLE INTERVENTIONS:    SMOKING:   reports that she has never smoked. She has never used smokeless tobacco.   - continued complete avoidance of all tobacco products     PHYSICAL ACTIVITY: encouraged healthy activity to improve CV fitness.  In general, targeting >150min/week of moderate-level activity or as much as tolerated    WEIGHT MANAGEMENT AND NUTRITION: continue DASH diet      OTHER:    # PMR - unclear if flare, per labs,per pt  Defer to rheum    Instructed to follow-up with PCP for remainder of adult medical needs: yes  We will partner with other providers in the management of established vascular disease and cardiometabolic risk factors.    Studies to Be Obtained: none   Labs to Be Obtained: lipid, cmp, urine for MA, cpk    Follow up in:  3mo    FATOU Bradshaw.  [facility]   [facility]   [phone]

## 2023-09-28 ENCOUNTER — HOSPITAL ENCOUNTER (OUTPATIENT)
Dept: LAB | Facility: MEDICAL CENTER | Age: 78
End: 2023-09-28
Attending: NURSE PRACTITIONER
Payer: MEDICARE

## 2023-09-28 DIAGNOSIS — E78.2 MIXED HYPERLIPIDEMIA: ICD-10-CM

## 2023-09-28 DIAGNOSIS — N18.31 STAGE 3A CHRONIC KIDNEY DISEASE: ICD-10-CM

## 2023-09-28 DIAGNOSIS — I10 PRIMARY HYPERTENSION: ICD-10-CM

## 2023-09-28 DIAGNOSIS — Z79.02 LONG TERM (CURRENT) USE OF ANTITHROMBOTICS/ANTIPLATELETS: ICD-10-CM

## 2023-09-28 LAB
ALBUMIN SERPL BCP-MCNC: 4.3 G/DL (ref 3.2–4.9)
ALBUMIN/GLOB SERPL: 1.7 G/DL
ALP SERPL-CCNC: 104 U/L (ref 30–99)
ALT SERPL-CCNC: 18 U/L (ref 2–50)
ANION GAP SERPL CALC-SCNC: 12 MMOL/L (ref 7–16)
AST SERPL-CCNC: 20 U/L (ref 12–45)
BILIRUB SERPL-MCNC: 0.4 MG/DL (ref 0.1–1.5)
BUN SERPL-MCNC: 32 MG/DL (ref 8–22)
CALCIUM ALBUM COR SERPL-MCNC: 8.3 MG/DL (ref 8.5–10.5)
CALCIUM SERPL-MCNC: 8.5 MG/DL (ref 8.5–10.5)
CHLORIDE SERPL-SCNC: 102 MMOL/L (ref 96–112)
CHOLEST SERPL-MCNC: 170 MG/DL (ref 100–199)
CK SERPL-CCNC: 113 U/L (ref 0–154)
CO2 SERPL-SCNC: 19 MMOL/L (ref 20–33)
CREAT SERPL-MCNC: 1.21 MG/DL (ref 0.5–1.4)
CREAT UR-MCNC: 13.3 MG/DL
FASTING STATUS PATIENT QL REPORTED: NORMAL
GFR SERPLBLD CREATININE-BSD FMLA CKD-EPI: 46 ML/MIN/1.73 M 2
GLOBULIN SER CALC-MCNC: 2.6 G/DL (ref 1.9–3.5)
GLUCOSE SERPL-MCNC: 94 MG/DL (ref 65–99)
HDLC SERPL-MCNC: 51 MG/DL
LDLC SERPL CALC-MCNC: 102 MG/DL
MICROALBUMIN UR-MCNC: <1.2 MG/DL
MICROALBUMIN/CREAT UR: NORMAL MG/G (ref 0–30)
POTASSIUM SERPL-SCNC: 4.5 MMOL/L (ref 3.6–5.5)
PROT SERPL-MCNC: 6.9 G/DL (ref 6–8.2)
SODIUM SERPL-SCNC: 133 MMOL/L (ref 135–145)
TRIGL SERPL-MCNC: 86 MG/DL (ref 0–149)

## 2023-09-28 PROCEDURE — 82043 UR ALBUMIN QUANTITATIVE: CPT

## 2023-09-28 PROCEDURE — 80061 LIPID PANEL: CPT

## 2023-09-28 PROCEDURE — 82550 ASSAY OF CK (CPK): CPT

## 2023-09-28 PROCEDURE — 36415 COLL VENOUS BLD VENIPUNCTURE: CPT

## 2023-09-28 PROCEDURE — 80053 COMPREHEN METABOLIC PANEL: CPT

## 2023-09-28 PROCEDURE — 82570 ASSAY OF URINE CREATININE: CPT

## 2023-10-04 ENCOUNTER — OFFICE VISIT (OUTPATIENT)
Dept: VASCULAR LAB | Facility: MEDICAL CENTER | Age: 78
End: 2023-10-04
Payer: MEDICARE

## 2023-10-04 VITALS
HEIGHT: 66 IN | WEIGHT: 158.4 LBS | SYSTOLIC BLOOD PRESSURE: 121 MMHG | BODY MASS INDEX: 25.46 KG/M2 | DIASTOLIC BLOOD PRESSURE: 76 MMHG | HEART RATE: 83 BPM

## 2023-10-04 DIAGNOSIS — E78.41 ELEVATED LIPOPROTEIN(A): ICD-10-CM

## 2023-10-04 DIAGNOSIS — Z98.890 S/P ABLATION OF ATRIAL FIBRILLATION: ICD-10-CM

## 2023-10-04 DIAGNOSIS — E78.2 MIXED HYPERLIPIDEMIA: ICD-10-CM

## 2023-10-04 DIAGNOSIS — N18.31 STAGE 3A CHRONIC KIDNEY DISEASE: ICD-10-CM

## 2023-10-04 DIAGNOSIS — I10 PRIMARY HYPERTENSION: ICD-10-CM

## 2023-10-04 DIAGNOSIS — Z79.02 LONG TERM (CURRENT) USE OF ANTITHROMBOTICS/ANTIPLATELETS: ICD-10-CM

## 2023-10-04 DIAGNOSIS — Z86.79 S/P ABLATION OF ATRIAL FIBRILLATION: ICD-10-CM

## 2023-10-04 DIAGNOSIS — E87.1 HYPONATREMIA: ICD-10-CM

## 2023-10-04 DIAGNOSIS — Z00.00 PREVENTATIVE HEALTH CARE: ICD-10-CM

## 2023-10-04 PROCEDURE — 99212 OFFICE O/P EST SF 10 MIN: CPT

## 2023-10-04 PROCEDURE — 99214 OFFICE O/P EST MOD 30 MIN: CPT

## 2023-10-04 PROCEDURE — 3074F SYST BP LT 130 MM HG: CPT

## 2023-10-04 PROCEDURE — 3078F DIAST BP <80 MM HG: CPT

## 2023-10-04 ASSESSMENT — FIBROSIS 4 INDEX: FIB4 SCORE: 1.31

## 2023-10-04 NOTE — PROGRESS NOTES
FOLLOW UP VASCULAR MEDICINE VISIT    Chacha Fraire is a 76 yo female who presents 10/4/2023  for   Chief Complaint   Patient presents with    Follow-Up     initially referred by Angel Burgos M.D. for  eval and mgmt of HTN      Subjective      Interval hx/concerns:   Ongoing stress due to her son's esophageal cancer, having some ongoing fatigue, and needing naps during the day.  Has seen rheum, is not having a polyneuralgia flare  Continues to have muscle aches and pains in her shoulders, back, neck.   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  Inconsistent log, has trouble remembering when in pain and has priorities in caring for her son/transportation.    Lifestyle factors:   Weight Change: -4lbs  BMI Readings from Last 5 Encounters:   10/04/23 25.57 kg/m²   08/29/23 25.82 kg/m²   06/29/23 25.41 kg/m²   06/27/23 25.04 kg/m²   05/24/23 25.69 kg/m²     Diet pattern changes since last visit: DASH   Daily salt intake estimate:  Low     EtOH: No  Exercise habits:  prior active, but limited d/t joint pain  Perceived barriers to care: joint pain, caregiving    Hyperlipidemia:  Stable, no changes.  On atorva 20mg, tolerating  No change in myalgias    Afib:   Stable, no changes   Seeing Dr. Burgos (EP), s/p ablation.  No OAC therapy.  No current sx.    Has [employer] mobile to use for afib eval at home     Antiplatelet/anticoagulation:    On asa, no bleeding    CKD  Denies poor appetite, SOB,      PMR:  Seeing rheum; continues to have diffuse pain    Current Outpatient Medications:     celecoxib, 100 mg, Oral, BID, Taking    predniSONE, Take 2 tabs (10mg) by mouth daily x14 days, then 1.5 tabs  "(7.5mg) daily x14 days, then 5mg (1 tab) daily x14 days, then 2.5mg (half tab) x14 days, Taking    aspirin, 81 mg, Oral, DAILY, Taking    atorvastatin, 20 mg, Oral, Nightly, Taking    benazepril, 40 mg, Oral, QHS, Taking    verapamil SR, 120 mg, Oral, DAILY, Taking    spironolactone, 12.5 mg, Oral, DAILY, Taking    diclofenac sodium, 4 g, Topical, 4X/DAY PRN, Taking    polyethylene glycol/lytes, 17 g, Oral, DAILY, Taking    acetaminophen, 650 mg, Oral, Q6HRS PRN, PRN    omeprazole, 20 mg, Oral, DAILY, Taking    Psyllium (METAMUCIL PO), Take  by mouth., Taking    B Complex-Folic Acid (B COMPLEX PLUS PO), 1 Tablet, Oral, DAILY, Taking    loratadine, 10 mg, Oral, PRN, Taking    vitamin D, 1,000 Units, Oral, DAILY, Taking    famotidine, 20 mg, Oral, QDAY PRN, Taking     Allergies   Allergen Reactions    Crestor [Rosuvastatin Calcium]     Flagyl [Metronidazole] Nausea    Hydrochlorothiazide W-Triamterene      Other reaction(s): Other (See Comments)  hyponatremia    Rosuvastatin      Other reaction(s): .Unknown     No family history on file.     Social History     Tobacco Use    Smoking status: Never    Smokeless tobacco: Never   Vaping Use    Vaping Use: Never used   Substance Use Topics    Alcohol use: Yes     Alcohol/week: 1.8 oz     Types: 3 Glasses of wine per week    Drug use: Never         Objective     Vitals:    10/04/23 0941   BP: 121/76   BP Location: Left arm   Patient Position: Sitting   BP Cuff Size: Adult   Pulse: 83   Weight: 71.8 kg (158 lb 6.4 oz)   Height: 1.676 m (5' 6\")        BP Readings from Last 5 Encounters:   10/04/23 121/76   06/29/23 (!) 147/67   06/27/23 122/73   05/24/23 120/84   04/26/23 (!) 154/78      Body mass index is 25.57 kg/m².  Physical Exam  Vitals reviewed.   Constitutional:       General: She is not in acute distress.     Appearance: Normal appearance.   HENT:      Head: Normocephalic and atraumatic.   Eyes:      General: No scleral icterus.  Pulmonary:      Effort: Pulmonary " "effort is normal. No respiratory distress.   Musculoskeletal:         General: No swelling. Normal range of motion.      Cervical back: Normal range of motion.      Right lower leg: No edema.      Left lower leg: No edema.   Skin:     General: Skin is warm and dry.      Coloration: Skin is not pale.   Neurological:      General: No focal deficit present.      Mental Status: She is alert and oriented to person, place, and time.      Coordination: Coordination normal.      Gait: Gait normal.   Psychiatric:         Mood and Affect: Mood normal.         Behavior: Behavior normal.       Lab Results   Component Value Date    CHOLSTRLTOT 170 2023     (H) 2023    HDL 51 2023    TRIGLYCERIDE 86 2023      Lab Results   Component Value Date/Time    LIPOPROTA 120 (H) 2022 10:34 AM      No results found for: \"APOB\"           Lab Results   Component Value Date    SODIUM 133 (L) 2023    POTASSIUM 4.5 2023    CHLORIDE 102 2023    CO2 19 (L) 2023    GLUCOSE 94 2023    BUN 32 (H) 2023    CREATININE 1.21 2023    GLOMRATE 48 (L) 2021    IFAFRICA 58 (A) 2022    IFNOTAFR 48 (A) 2022        Lab Results   Component Value Date    WBC 8.1 2023    RBC 4.85 2023    HEMOGLOBIN 14.0 2023    HEMATOCRIT 43.6 2023    MCV 89.9 2023    MCH 28.9 2023    MCHC 32.1 (L) 2023    MPV 11.2 2023         Lab Results   Component Value Date/Time    MALBCRT see below 2023 11:28 AM    MICROALBUR <1.2 2023 11:28 AM      VASCULAR IMAGING:   Last EKG:   Results for orders placed or performed in visit on 23   EKG   Result Value Ref Range    Report       [facility]    Test Date:  2023  Pt Name:    CLARITA BUTLER                [facility]  MRN:        [id number]                      Room:  Gender:     Female                       Technician: OLVIN  :        1945                  "  Requested By:ANGEL BURGOS  Order #:    [id number]                    Reading MD: Angel Burgos MD    Measurements  Intervals                                Axis  Rate:       73                           P:          79  VA:         158                          QRS:        79  QRSD:       79                           T:          42  QT:         426  QTc:        469    Interpretive Statements  Sinus rhythm  Compared to ECG 07/26/2022 11:31:05  Unchanged  Electronically Signed On 5- 12:56:02 PDT by Angel Burgos MD       Echo 3/23/22   The left ventricular ejection fraction is visually estimated to be 65%.  No significant valvular Doppler abnormality.  Normal estimated right atrial pressure, unable to estimate RVSP.   No prior study is available for comparison.     LLE VR duplex 3/31/22   No LEFT lower extremity DVT or SVT.   No deep venous reflux demonstrated.    RLE venous 5/13/22   No deep or superficial venous abnormalities in the right leg.        Medical Decision Making:  Today's Assessment / Status / Plan:     1. Preventative health care  Referral to establish with [facility] PCP      2. Primary hypertension  Referral to 24-Hour Blood Pressure Monitoring    Basic Metabolic Panel      3. Mixed hyperlipidemia        4. S/P ablation of atrial fibrillation        5. Elevated lipoprotein(a)        6. Long term (current) use of antithrombotics/antiplatelets        7. Stage 3a chronic kidney disease (HCC)        8. Hyponatremia  Basic Metabolic Panel          Patient Type: Primary Prevention    Etiology of Established CVD if Present:     1) Afib, s/p ablation - stable   - ongoing care with cardiology for decision on OAC, rate/rhythm control and monitoring    BLOOD PRESSURE MANAGEMENT:  ACC/AHA (2017) goal <130/80, consider <140/80 reasonable due to age and prior med ADRs   Home BP at goal:  110-160s/60-80s, averaging out to 120s/70s ,mostly   Office BP at goal:  Yes   24h ABPM: not ordered to date  Stopped doxazosin  in the past for unclear reasons  Plan:   Monitoring:   - continue home BP monitoring and excellent record keeping- verified home machine is reading accurate at last visit  - order 24h ABPM:  ordered today to help rule out white coat HTN and to determine true home BP control level  - monitor lytes/gfr routinely   - contact office if BP consistently >140/>90 for discussion of tx adjustments   Medications:  - continue benazepril 40mg QHS   DHP-CCB: continue to hold amlodipine d/t LE swelling  Thiazide: avoid due to hypoNa+  - continue spironolactone 12.5mg daily  - continue verapamil 120mg every AM    LIPID MANAGEMENT:   Qualifies for Statin Therapy Based on 2018 ACC/AHA Guidelines: yes, Primary Severe HLD / FH (baseline LDL-C >190)  The 10-year ASCVD risk score (Amanda JACK, et al., 2019) is: 23.1%  High   - entire family with severe HLD, no premature ASCVD though, possible FH  Lp(a) = 120   Major ASCVD events: None  High-risk conditions: N/A  Risk-enhancers: Persistently elevated LDL-C >159 and Lp(a) >50  Currently on statin: yes, did not tolerate rosuvastatin or atorva at higher doses due to myalgias worsening  Treatment goals: reduce LDL-C >50%   At goal? No, approaching, 9/2023, slight elevated  - reasonable control   Previously taken off atorva d/t muscle pain, no changed noted upon stopping.  Has resumed atorva without any significant worsening.  Did not trial prava + zetia as recommended.      Plan:   - reinforced ongoing TLC measures as noted   - cont fiber and Vit D  Meds:   - continue atorvastatin 20mg daily  - update labs, order at next appt    ANTITHROMBOTIC THERAPY:  continue asa 81mg daily but ok to take qod d/t consistent epistaxis with daily dosing    GLYCEMIC STATUS: Normal    LIFESTYLE INTERVENTIONS:    SMOKING:   reports that she has never smoked. She has never used smokeless tobacco.   - continued complete avoidance of all tobacco products     PHYSICAL ACTIVITY: encouraged healthy activity to  improve CV fitness.  In general, targeting >150min/week of moderate-level activity or as much as tolerated    WEIGHT MANAGEMENT AND NUTRITION: continue DASH diet      OTHER:    # PMR - unclear if flare, per labs,per pt  Defer to rheum    #need to establish with PCP for preventative care - referral sent to establish with PCP    Instructed to follow-up with PCP for remainder of adult medical needs: yes  We will partner with other providers in the management of established vascular disease and cardiometabolic risk factors.    Studies to Be Obtained: ABPM   Labs to Be Obtained: BMP prior to next appt, lipid, cmp or at next appt    Follow up in:  6-8 weeks to review ABPM    SAGE Almanza.  [facility]   [place] [facility]   [phone]

## 2023-10-10 ENCOUNTER — TELEPHONE (OUTPATIENT)
Dept: HEALTH INFORMATION MANAGEMENT | Facility: OTHER | Age: 78
End: 2023-10-10
Payer: MEDICARE

## 2023-10-23 ENCOUNTER — APPOINTMENT (OUTPATIENT)
Dept: VASCULAR LAB | Facility: MEDICAL CENTER | Age: 78
End: 2023-10-23
Payer: MEDICARE

## 2023-11-08 ENCOUNTER — NON-PROVIDER VISIT (OUTPATIENT)
Dept: VASCULAR LAB | Facility: MEDICAL CENTER | Age: 78
End: 2023-11-08
Payer: MEDICARE

## 2023-11-08 ENCOUNTER — APPOINTMENT (OUTPATIENT)
Dept: VASCULAR LAB | Facility: MEDICAL CENTER | Age: 78
End: 2023-11-08
Payer: MEDICARE

## 2023-11-08 PROCEDURE — 93786 AMBL BP MNTR W/SW REC ONLY: CPT

## 2023-11-08 PROCEDURE — 93788 AMBL BP MNTR W/SW A/R: CPT

## 2023-11-08 NOTE — NON-PROVIDER
ABPM placed on 11/08/23 at 12:58PM.    Patient educated on monitor care and procedure.    Patient to return device on 11/09/23 before 1700.    Verbal understanding provided.   [facility]  [facility]  Ph. [phone]  Fx. [phone]

## 2023-11-15 NOTE — PROGRESS NOTES
Ambulatory blood pressure monitor results reviewed  Full report under media tab  Reasonable data acquisition    Mean daytime: 122/72 consistent with well-controlled out of office blood pressure    Nocturnal dip: Somewhat blunted    Clinical correlation needed.  We will discuss with patient at follow-up visit    Michael Bloch, MD  Vascular Care

## 2023-11-21 ENCOUNTER — OFFICE VISIT (OUTPATIENT)
Dept: VASCULAR LAB | Facility: MEDICAL CENTER | Age: 78
End: 2023-11-21
Attending: NURSE PRACTITIONER
Payer: MEDICARE

## 2023-11-21 VITALS
HEART RATE: 70 BPM | SYSTOLIC BLOOD PRESSURE: 125 MMHG | BODY MASS INDEX: 24.8 KG/M2 | WEIGHT: 158 LBS | DIASTOLIC BLOOD PRESSURE: 74 MMHG | HEIGHT: 67 IN

## 2023-11-21 DIAGNOSIS — E87.1 HYPONATREMIA: ICD-10-CM

## 2023-11-21 DIAGNOSIS — E78.2 MIXED HYPERLIPIDEMIA: ICD-10-CM

## 2023-11-21 DIAGNOSIS — I10 PRIMARY HYPERTENSION: ICD-10-CM

## 2023-11-21 PROCEDURE — 3078F DIAST BP <80 MM HG: CPT | Performed by: NURSE PRACTITIONER

## 2023-11-21 PROCEDURE — 3074F SYST BP LT 130 MM HG: CPT | Performed by: NURSE PRACTITIONER

## 2023-11-21 PROCEDURE — 99214 OFFICE O/P EST MOD 30 MIN: CPT | Performed by: NURSE PRACTITIONER

## 2023-11-21 PROCEDURE — 99212 OFFICE O/P EST SF 10 MIN: CPT

## 2023-11-21 ASSESSMENT — FIBROSIS 4 INDEX: FIB4 SCORE: 1.31

## 2023-11-21 NOTE — PROGRESS NOTES
FOLLOW UP VASCULAR MEDICINE VISIT    Chacha Fraire is a 76 yo female who presents 11/21/2023  for   Chief Complaint   Patient presents with    Follow-Up     initially referred by Angel Burgos M.D. for  eval and mgmt of HTN      Subjective      Interval hx/concerns:   Ongoing stress due to her son's esophageal cancer, having some ongoing fatigue, and needing naps during the day.  Completed the ABPM recently  Seeing ortho for bilateral hand numbness and tingling  May do injections     HTN:  Interval hx/concerns: no issues,   Forgets spironolactone more often than not due to travel/driving in ams    Home BP log:  variable readings, 118-167/60-80s.   Lifestyle factors:   Weight Change: -4lbs  BMI Readings from Last 5 Encounters:   11/21/23 24.75 kg/m²   10/11/23 25.50 kg/m²   10/04/23 25.57 kg/m²   08/29/23 25.82 kg/m²   06/29/23 25.41 kg/m²     Diet pattern changes since last visit: DASH   Daily salt intake estimate:  Low     EtOH: No  Exercise habits:  prior active, but limited d/t joint pain  Perceived barriers to care: joint pain, caregiving    Hyperlipidemia:  Stable, no changes.  On atorva 20mg, tolerating  Previously did not start zetia but has some on hand   No change in myalgias    Afib:   Stable, no changes   Seeing Dr. Burgos (EP), s/p ablation.  No OAC therapy.  No current sx.    Has [employer] mobile to use for afib eval at home     Antiplatelet/anticoagulation:    On asa, no bleeding    CKD  Denies poor appetite, SOB,      PMR:  Seeing rheum; continues to have diffuse pain    Current Outpatient Medications:     celecoxib, 100 mg, Oral, BID, Taking    predniSONE, Take 2 tabs (10mg) by mouth daily x14 days, then 1.5 tabs (7.5mg) daily x14 days, then 5mg (1 tab) daily x14 days, then 2.5mg (half tab) x14 days, Taking    aspirin, 81 mg, Oral, DAILY, Taking    atorvastatin, 20 mg, Oral, Nightly, Taking    benazepril, 40 mg, Oral, QHS, Taking    verapamil SR, 120 mg, Oral, DAILY, Taking    spironolactone, 12.5 mg,  "Oral, DAILY, Taking    diclofenac sodium, 4 g, Topical, 4X/DAY PRN, Taking    polyethylene glycol/lytes, 17 g, Oral, DAILY, Taking    acetaminophen, 650 mg, Oral, Q6HRS PRN, Taking    omeprazole, 20 mg, Oral, DAILY, Taking    Psyllium (METAMUCIL PO), Take  by mouth., Taking    B Complex-Folic Acid (B COMPLEX PLUS PO), 1 Tablet, Oral, DAILY, Taking    loratadine, 10 mg, Oral, PRN, Taking    vitamin D, 1,000 Units, Oral, DAILY, Taking    famotidine, 20 mg, Oral, QDAY PRN, Taking     Allergies   Allergen Reactions    Crestor [Rosuvastatin Calcium]     Flagyl [Metronidazole] Nausea    Hydrochlorothiazide W-Triamterene      Other reaction(s): Other (See Comments)  hyponatremia    Rosuvastatin      Other reaction(s): .Unknown     No family history on file.     Social History     Tobacco Use    Smoking status: Never    Smokeless tobacco: Never   Vaping Use    Vaping Use: Never used   Substance Use Topics    Alcohol use: Yes     Alcohol/week: 1.8 oz     Types: 3 Glasses of wine per week    Drug use: Never         Objective     Vitals:    11/21/23 1106 11/21/23 1110   BP: (!) 147/71 125/74   BP Location: Left arm Left arm   Patient Position: Sitting Sitting   BP Cuff Size: Adult Adult   Pulse: 80 70   Weight: 71.7 kg (158 lb)    Height: 1.702 m (5' 7\")         BP Readings from Last 5 Encounters:   11/21/23 125/74   10/11/23 (!) 153/91   10/04/23 121/76   06/29/23 (!) 147/67   06/27/23 122/73      Body mass index is 24.75 kg/m².  Physical Exam  Vitals reviewed.   Constitutional:       General: She is not in acute distress.     Appearance: Normal appearance.   HENT:      Head: Normocephalic and atraumatic.   Eyes:      General: No scleral icterus.  Pulmonary:      Effort: Pulmonary effort is normal. No respiratory distress.   Musculoskeletal:         General: No swelling. Normal range of motion.      Cervical back: Normal range of motion.      Right lower leg: No edema.      Left lower leg: No edema.   Skin:     General: Skin " "is warm and dry.      Coloration: Skin is not pale.   Neurological:      General: No focal deficit present.      Mental Status: She is alert and oriented to person, place, and time.      Coordination: Coordination normal.      Gait: Gait normal.   Psychiatric:         Mood and Affect: Mood normal.         Behavior: Behavior normal.       Lab Results   Component Value Date    CHOLSTRLTOT 170 2023     (H) 2023    HDL 51 2023    TRIGLYCERIDE 86 2023      Lab Results   Component Value Date/Time    LIPOPROTA 120 (H) 2022 10:34 AM      No results found for: \"APOB\"           Lab Results   Component Value Date    SODIUM 133 (L) 2023    POTASSIUM 4.5 2023    CHLORIDE 102 2023    CO2 19 (L) 2023    GLUCOSE 94 2023    BUN 32 (H) 2023    CREATININE 1.21 2023    GLOMRATE 48 (L) 2021    IFAFRICA 58 (A) 2022    IFNOTAFR 48 (A) 2022        Lab Results   Component Value Date    WBC 8.1 2023    RBC 4.85 2023    HEMOGLOBIN 14.0 2023    HEMATOCRIT 43.6 2023    MCV 89.9 2023    MCH 28.9 2023    MCHC 32.1 (L) 2023    MPV 11.2 2023         Lab Results   Component Value Date/Time    MALBCRT see below 2023 11:28 AM    MICROALBUR <1.2 2023 11:28 AM      VASCULAR IMAGING:   Last EKG:   Results for orders placed or performed in visit on 23   EKG   Result Value Ref Range    Report       [facility]    Test Date:  2023  Pt Name:    CLARITA BUTLER                Department: [facility]  MRN:        [id number]                      Room:  Gender:     Female                       Technician: YM  :        1945                   Requested By:ANGEL BURGOS  Order #:    [id number]                    Reading MD: Angel Burgos MD    Measurements  Intervals                                Axis  Rate:       73                           P:          79  FL:         158               "            QRS:        79  QRSD:       79                           T:          42  QT:         426  QTc:        469    Interpretive Statements  Sinus rhythm  Compared to ECG 07/26/2022 11:31:05  Unchanged  Electronically Signed On 5- 12:56:02 PDT by Angel Burgos MD       Echo 3/23/22   The left ventricular ejection fraction is visually estimated to be 65%.  No significant valvular Doppler abnormality.  Normal estimated right atrial pressure, unable to estimate RVSP.   No prior study is available for comparison.     LLE VR duplex 3/31/22   No LEFT lower extremity DVT or SVT.   No deep venous reflux demonstrated.    RLE venous 5/13/22   No deep or superficial venous abnormalities in the right leg.        Medical Decision Making:  Today's Assessment / Status / Plan:     1. Mixed hyperlipidemia  Lipid Profile      2. Primary hypertension  Basic Metabolic Panel      3. Hyponatremia  ELECTROLYTES        Patient Type: Primary Prevention    Etiology of Established CVD if Present:     1) Afib, s/p ablation - stable   - ongoing care with cardiology for decision on OAC, rate/rhythm control and monitoring    BLOOD PRESSURE MANAGEMENT:  ACC/AHA (2017) goal <130/80, consider <140/80 reasonable due to age and prior med ADRs   Home BP at goal:  110-160s/60-80s, averaging out to 120s/70s ,mostly   Office BP at goal:  Yes   24h ABPM: 11/2023: 122/72 consistent with well-controlled out of office BP  Stopped doxazosin in the past for unclear reasons  Plan:   Monitoring:   - continue home BP monitoring- 3-4 times per week; does not need to do daily as ABPM is reassuring   - monitor lytes/gfr routinely   - contact office if BP consistently >140/>90 for discussion of tx adjustments   Medications:  - continue benazepril 40mg QHS   DHP-CCB: continue to hold amlodipine d/t LE swelling  Thiazide: avoid due to hypoNa+  - continue spironolactone 12.5mg daily- recheck Na+ in 1 month   - continue verapamil 120mg every AM    LIPID  MANAGEMENT:   Qualifies for Statin Therapy Based on 2018 ACC/AHA Guidelines: yes, Primary Severe HLD / FH (baseline LDL-C >190)  The 10-year ASCVD risk score (Amanda JACK, et al., 2019) is: 24.4%  High   - entire family with severe HLD, no premature ASCVD though, possible FH  Lp(a) = 120   Major ASCVD events: None  High-risk conditions: N/A  Risk-enhancers: Persistently elevated LDL-C >159 and Lp(a) >50  Currently on statin: yes, did not tolerate rosuvastatin or atorva at higher doses due to myalgias worsening  Treatment goals: reduce LDL-C >50%   At goal? No, approaching, 9/2023, slight elevated  - reasonable control   Previously taken off atorva d/t muscle pain, no changed noted upon stopping.  Has resumed atorva without any significant worsening.  Did not trial prava + zetia as recommended.      Plan:   - reinforced ongoing TLC measures as noted   - cont fiber and Vit D  Meds:   - continue atorvastatin 20mg daily  - will trial Zetia   - update labs, order at next appt    ANTITHROMBOTIC THERAPY:  continue asa 81mg daily but ok to take qod d/t consistent epistaxis with daily dosing    GLYCEMIC STATUS: Normal    LIFESTYLE INTERVENTIONS:    SMOKING:   reports that she has never smoked. She has never used smokeless tobacco.   - continued complete avoidance of all tobacco products     PHYSICAL ACTIVITY: encouraged healthy activity to improve CV fitness.  In general, targeting >150min/week of moderate-level activity or as much as tolerated    WEIGHT MANAGEMENT AND NUTRITION: continue DASH diet      OTHER:    # PMR - unclear if flare, per labs,per pt  Defer to rheum    # CKD stage 3a  Will follow over time  - Increase hydration  - Recheck GFR prior to next appt     #need to establish with PCP for preventative care - referral sent to establish with PCP    Instructed to follow-up with PCP for remainder of adult medical needs: yes  We will partner with other providers in the management of established vascular disease  and cardiometabolic risk factors.    Studies to Be Obtained: None   Labs to Be Obtained: electrolytes in 1 month; CMP, lipid     Follow up in: 6 months or sooner if needed    FATOU Broussard.  [facility]   [place] [facility]   [phone]

## 2023-11-29 ENCOUNTER — PATIENT MESSAGE (OUTPATIENT)
Dept: HEALTH INFORMATION MANAGEMENT | Facility: OTHER | Age: 78
End: 2023-11-29

## 2023-12-06 ENCOUNTER — OFFICE VISIT (OUTPATIENT)
Dept: CARDIOLOGY | Facility: MEDICAL CENTER | Age: 78
End: 2023-12-06
Attending: INTERNAL MEDICINE
Payer: MEDICARE

## 2023-12-06 VITALS
DIASTOLIC BLOOD PRESSURE: 88 MMHG | WEIGHT: 157 LBS | BODY MASS INDEX: 24.64 KG/M2 | HEIGHT: 67 IN | HEART RATE: 79 BPM | RESPIRATION RATE: 16 BRPM | SYSTOLIC BLOOD PRESSURE: 146 MMHG | OXYGEN SATURATION: 97 %

## 2023-12-06 DIAGNOSIS — E78.2 MIXED HYPERLIPIDEMIA: Chronic | ICD-10-CM

## 2023-12-06 DIAGNOSIS — I48.0 PAF (PAROXYSMAL ATRIAL FIBRILLATION) (HCC): Chronic | ICD-10-CM

## 2023-12-06 DIAGNOSIS — M35.3 PMR (POLYMYALGIA RHEUMATICA) (HCC): ICD-10-CM

## 2023-12-06 DIAGNOSIS — Z98.890 S/P ABLATION OF ATRIAL FIBRILLATION: ICD-10-CM

## 2023-12-06 DIAGNOSIS — Z86.79 S/P ABLATION OF ATRIAL FIBRILLATION: ICD-10-CM

## 2023-12-06 DIAGNOSIS — E03.9 HYPOTHYROIDISM, UNSPECIFIED TYPE: ICD-10-CM

## 2023-12-06 DIAGNOSIS — E78.00 PRIMARY HYPERCHOLESTEROLEMIA: ICD-10-CM

## 2023-12-06 LAB — EKG IMPRESSION: NORMAL

## 2023-12-06 PROCEDURE — 93005 ELECTROCARDIOGRAM TRACING: CPT | Performed by: INTERNAL MEDICINE

## 2023-12-06 PROCEDURE — 99214 OFFICE O/P EST MOD 30 MIN: CPT | Mod: 25 | Performed by: INTERNAL MEDICINE

## 2023-12-06 PROCEDURE — 93010 ELECTROCARDIOGRAM REPORT: CPT | Performed by: INTERNAL MEDICINE

## 2023-12-06 PROCEDURE — 3079F DIAST BP 80-89 MM HG: CPT | Performed by: INTERNAL MEDICINE

## 2023-12-06 PROCEDURE — 3077F SYST BP >= 140 MM HG: CPT | Performed by: INTERNAL MEDICINE

## 2023-12-06 PROCEDURE — 99213 OFFICE O/P EST LOW 20 MIN: CPT | Performed by: INTERNAL MEDICINE

## 2023-12-06 ASSESSMENT — FIBROSIS 4 INDEX: FIB4 SCORE: 1.33

## 2023-12-06 NOTE — PROGRESS NOTES
Chief Complaint   Patient presents with    Atrial Fibrillation     F/V Dx:PAF (paroxysmal atrial fibrillation) (HCC)         Subjective     Tim Fraire is a 78 y.o. female who presents today with previous history of A-fib ablation many years ago.  No recurrence.  Does have a Kardia.  Nothing recorded by her.  No palpitations.  Ambulatory blood pressure monitoring  number was quite good.  On appropriate medications.  Tolerating statins.  Under considerable stress.  Son with esophageal cancer.  Past Medical History:   Diagnosis Date    Arthritis     Polymyalgia Rheumatica; off prednisone since 2/7/22    Benign essential HTN 5/29/2012    DDD (degenerative disc disease), lumbar     Heart burn     High cholesterol     Hyperlipidemia 5/29/2012    PAF (paroxysmal atrial fibrillation) (HCC) 5/29/2012    Ablations x 2    Personal history of prior ablation treatment 5/29/2012    PONV (postoperative nausea and vomiting)      Past Surgical History:   Procedure Laterality Date    PB TOTAL KNEE ARTHROPLASTY Right 4/27/2022    Procedure: ARTHROPLASTY, KNEE, TOTAL;  Surgeon: Pedro Ibanez M.D.;  Location: SURGERY [facility];  Service: Orthopedics     History reviewed. No pertinent family history.  Social History     Socioeconomic History    Marital status:      Spouse name: Not on file    Number of children: Not on file    Years of education: Not on file    Highest education level: Not on file   Occupational History    Not on file   Tobacco Use    Smoking status: Never    Smokeless tobacco: Never   Vaping Use    Vaping Use: Never used   Substance and Sexual Activity    Alcohol use: Yes     Alcohol/week: 1.8 oz     Types: 3 Glasses of wine per week    Drug use: Never    Sexual activity: Not on file   Other Topics Concern    Not on file   Social History Narrative    Not on file     Social Determinants of Health     Financial Resource Strain: Not on file   Food Insecurity: Not on file   Transportation Needs: Not  on file   Physical Activity: Not on file   Stress: Not on file   Social Connections: Not on file   Intimate Partner Violence: Not on file   Housing Stability: Not on file     Allergies   Allergen Reactions    Crestor [Rosuvastatin Calcium]     Flagyl [Metronidazole] Nausea    Hydrochlorothiazide W-Triamterene      Other reaction(s): Other (See Comments)  hyponatremia    Rosuvastatin      Other reaction(s): .Unknown     Outpatient Encounter Medications as of 12/6/2023   Medication Sig Dispense Refill    Turmeric (QC TUMERIC COMPLEX PO) Take 1,000 mg by mouth every day.      celecoxib (CELEBREX) 100 MG Cap Take 1 Capsule by mouth 2 times a day. 60 Capsule 2    aspirin 81 MG EC tablet Take 81 mg by mouth every day. Only as needed      atorvastatin (LIPITOR) 20 MG Tab Take 1 Tablet by mouth every evening. 90 Tablet 3    benazepril (LOTENSIN) 40 MG tablet Take 1 Tablet by mouth at bedtime. 90 Tablet 3    verapamil SR (CALAN-SR) 120 MG CR tablet Take 1 Tablet by mouth every day. 90 Tablet 3    spironolactone (ALDACTONE) 25 MG Tab Take 0.5 Tablets by mouth every day for 360 days. (Patient taking differently: Take 25 mg by mouth every day. Not every day) 45 Tablet 3    diclofenac sodium ([employer] DICLOFENAC SODIUM) 1 % Gel Apply 4 g topically 4 times a day as needed (as needed for knee or back pain (or hip pain)). 350 g 3    acetaminophen (TYLENOL) 650 MG CR tablet Take 650 mg by mouth every 6 hours as needed.      omeprazole (PRILOSEC) 20 MG delayed-release capsule Take 20 mg by mouth every day.      Psyllium (METAMUCIL PO) Take  by mouth.      B Complex-Folic Acid (B COMPLEX PLUS PO) Take 1 Tablet by mouth every day.      loratadine (CLARITIN) 10 MG Tab Take 10 mg by mouth as needed.      vitamin D (CHOLECALCIFEROL) 1000 UNIT TABS Take 1,000 Units by mouth every day. D3      famotidine (PEPCID) 20 MG Tab Take 20 mg by mouth 1 time daily as needed.      [DISCONTINUED] predniSONE (DELTASONE) 5 MG Tab Take 2 tabs (10mg) by mouth  "daily x14 days, then 1.5 tabs (7.5mg) daily x14 days, then 5mg (1 tab) daily x14 days, then 2.5mg (half tab) x14 days (Patient not taking: Reported on 12/6/2023) 90 Tablet 0    [DISCONTINUED] polyethylene glycol/lytes (MIRALAX) 17 g Pack Take 1 Packet by mouth every day. (Patient not taking: Reported on 12/6/2023) 20 Each 3     No facility-administered encounter medications on file as of 12/6/2023.     ROS           Objective     BP (!) 146/88 (BP Location: Left arm, Patient Position: Sitting, BP Cuff Size: Adult)   Pulse 79   Resp 16   Ht 1.702 m (5' 7\")   Wt 71.2 kg (157 lb)   SpO2 97%   BMI 24.59 kg/m²     Physical Exam  Constitutional:       Appearance: She is well-developed.   HENT:      Head: Normocephalic and atraumatic.   Eyes:      Pupils: Pupils are equal, round, and reactive to light.   Cardiovascular:      Rate and Rhythm: Normal rate and regular rhythm.      Heart sounds: Normal heart sounds. No murmur heard.     No friction rub. No gallop.   Pulmonary:      Effort: Pulmonary effort is normal.      Breath sounds: Normal breath sounds.   Abdominal:      General: Bowel sounds are normal.      Palpations: Abdomen is soft.   Musculoskeletal:         General: Normal range of motion.      Cervical back: Normal range of motion and neck supple.   Skin:     General: Skin is warm.   Neurological:      Mental Status: She is alert and oriented to person, place, and time.      Cranial Nerves: No cranial nerve deficit.   Psychiatric:         Behavior: Behavior normal.         Thought Content: Thought content normal.         Judgment: Judgment normal.                Assessment & Plan     1. PAF (paroxysmal atrial fibrillation) ([facility])  EKG      2. S/P ablation of atrial fibrillation        3. PMR (polymyalgia rheumatica) ([facility])        4. Mixed hyperlipidemia        5. Hypothyroidism, unspecified type        6. Primary hypercholesterolemia            Medical Decision Making: Today's Assessment/Status/Plan:   1.  " Atrial fibrillation no recurrence status post ablation does have a Kardia.  2.  Hypertension continue current regimen.  Good ambulatory numbers.  3.  Hyperlipidemia continue on atorvastatin.  4.  Follow-up with me in 6 months.

## 2024-01-13 ENCOUNTER — HOSPITAL ENCOUNTER (OUTPATIENT)
Dept: LAB | Facility: MEDICAL CENTER | Age: 79
End: 2024-01-13
Attending: NURSE PRACTITIONER
Payer: MEDICARE

## 2024-01-13 DIAGNOSIS — M25.511 CHRONIC PAIN OF BOTH SHOULDERS: ICD-10-CM

## 2024-01-13 DIAGNOSIS — R20.0 NUMBNESS AND TINGLING IN BOTH HANDS: ICD-10-CM

## 2024-01-13 DIAGNOSIS — M70.61 TROCHANTERIC BURSITIS OF RIGHT HIP: ICD-10-CM

## 2024-01-13 DIAGNOSIS — Z78.0 POSTMENOPAUSAL: ICD-10-CM

## 2024-01-13 DIAGNOSIS — M25.512 CHRONIC PAIN OF BOTH SHOULDERS: ICD-10-CM

## 2024-01-13 DIAGNOSIS — Z79.52 LONG TERM (CURRENT) USE OF SYSTEMIC STEROIDS: ICD-10-CM

## 2024-01-13 DIAGNOSIS — Z13.820 SCREENING FOR OSTEOPOROSIS: ICD-10-CM

## 2024-01-13 DIAGNOSIS — G89.29 CHRONIC NECK PAIN: ICD-10-CM

## 2024-01-13 DIAGNOSIS — M35.3 POLYMYALGIA RHEUMATICA (HCC): ICD-10-CM

## 2024-01-13 DIAGNOSIS — R20.2 NUMBNESS AND TINGLING IN BOTH HANDS: ICD-10-CM

## 2024-01-13 DIAGNOSIS — M54.2 CHRONIC NECK PAIN: ICD-10-CM

## 2024-01-13 DIAGNOSIS — M54.6 CHRONIC BILATERAL THORACIC BACK PAIN: ICD-10-CM

## 2024-01-13 DIAGNOSIS — G89.29 CHRONIC PAIN OF BOTH SHOULDERS: ICD-10-CM

## 2024-01-13 DIAGNOSIS — G89.29 CHRONIC BILATERAL THORACIC BACK PAIN: ICD-10-CM

## 2024-01-13 LAB
ALBUMIN SERPL BCP-MCNC: 4.6 G/DL (ref 3.2–4.9)
ALBUMIN/GLOB SERPL: 1.9 G/DL
ALP SERPL-CCNC: 96 U/L (ref 30–99)
ALT SERPL-CCNC: 25 U/L (ref 2–50)
ANION GAP SERPL CALC-SCNC: 12 MMOL/L (ref 7–16)
AST SERPL-CCNC: 21 U/L (ref 12–45)
BASOPHILS # BLD AUTO: 0.8 % (ref 0–1.8)
BASOPHILS # BLD: 0.08 K/UL (ref 0–0.12)
BILIRUB SERPL-MCNC: 0.4 MG/DL (ref 0.1–1.5)
BUN SERPL-MCNC: 30 MG/DL (ref 8–22)
CALCIUM ALBUM COR SERPL-MCNC: 8.6 MG/DL (ref 8.5–10.5)
CALCIUM SERPL-MCNC: 9.1 MG/DL (ref 8.5–10.5)
CHLORIDE SERPL-SCNC: 105 MMOL/L (ref 96–112)
CO2 SERPL-SCNC: 22 MMOL/L (ref 20–33)
CREAT SERPL-MCNC: 1.09 MG/DL (ref 0.5–1.4)
CRP SERPL HS-MCNC: <0.3 MG/DL (ref 0–0.75)
EOSINOPHIL # BLD AUTO: 0.22 K/UL (ref 0–0.51)
EOSINOPHIL NFR BLD: 2.2 % (ref 0–6.9)
ERYTHROCYTE [DISTWIDTH] IN BLOOD BY AUTOMATED COUNT: 45.6 FL (ref 35.9–50)
ERYTHROCYTE [SEDIMENTATION RATE] IN BLOOD BY WESTERGREN METHOD: 9 MM/HOUR (ref 0–25)
GFR SERPLBLD CREATININE-BSD FMLA CKD-EPI: 52 ML/MIN/1.73 M 2
GLOBULIN SER CALC-MCNC: 2.4 G/DL (ref 1.9–3.5)
GLUCOSE SERPL-MCNC: 91 MG/DL (ref 65–99)
HCT VFR BLD AUTO: 43.2 % (ref 37–47)
HGB BLD-MCNC: 13.9 G/DL (ref 12–16)
IMM GRANULOCYTES # BLD AUTO: 0.05 K/UL (ref 0–0.11)
IMM GRANULOCYTES NFR BLD AUTO: 0.5 % (ref 0–0.9)
LYMPHOCYTES # BLD AUTO: 1.64 K/UL (ref 1–4.8)
LYMPHOCYTES NFR BLD: 16.3 % (ref 22–41)
MCH RBC QN AUTO: 28.7 PG (ref 27–33)
MCHC RBC AUTO-ENTMCNC: 32.2 G/DL (ref 32.2–35.5)
MCV RBC AUTO: 89.3 FL (ref 81.4–97.8)
MONOCYTES # BLD AUTO: 0.71 K/UL (ref 0–0.85)
MONOCYTES NFR BLD AUTO: 7.1 % (ref 0–13.4)
NEUTROPHILS # BLD AUTO: 7.35 K/UL (ref 1.82–7.42)
NEUTROPHILS NFR BLD: 73.1 % (ref 44–72)
NRBC # BLD AUTO: 0 K/UL
NRBC BLD-RTO: 0 /100 WBC (ref 0–0.2)
PLATELET # BLD AUTO: 319 K/UL (ref 164–446)
PMV BLD AUTO: 10.6 FL (ref 9–12.9)
POTASSIUM SERPL-SCNC: 4.2 MMOL/L (ref 3.6–5.5)
PROT SERPL-MCNC: 7 G/DL (ref 6–8.2)
RBC # BLD AUTO: 4.84 M/UL (ref 4.2–5.4)
SODIUM SERPL-SCNC: 139 MMOL/L (ref 135–145)
WBC # BLD AUTO: 10.1 K/UL (ref 4.8–10.8)

## 2024-01-13 PROCEDURE — 85025 COMPLETE CBC W/AUTO DIFF WBC: CPT

## 2024-01-13 PROCEDURE — 86140 C-REACTIVE PROTEIN: CPT

## 2024-01-13 PROCEDURE — 85652 RBC SED RATE AUTOMATED: CPT

## 2024-01-13 PROCEDURE — 36415 COLL VENOUS BLD VENIPUNCTURE: CPT

## 2024-01-13 PROCEDURE — 80053 COMPREHEN METABOLIC PANEL: CPT

## 2024-02-01 DIAGNOSIS — I48.0 PAF (PAROXYSMAL ATRIAL FIBRILLATION) (HCC): Chronic | ICD-10-CM

## 2024-02-01 DIAGNOSIS — I10 PRIMARY HYPERTENSION: ICD-10-CM

## 2024-02-01 RX ORDER — BENAZEPRIL HYDROCHLORIDE 40 MG/1
40 TABLET ORAL
Qty: 90 TABLET | Refills: 3 | Status: SHIPPED | OUTPATIENT
Start: 2024-02-01

## 2024-03-30 DIAGNOSIS — I10 PRIMARY HYPERTENSION: ICD-10-CM

## 2024-04-01 ENCOUNTER — TELEPHONE (OUTPATIENT)
Dept: VASCULAR LAB | Facility: MEDICAL CENTER | Age: 79
End: 2024-04-01
Payer: MEDICARE

## 2024-04-01 RX ORDER — SPIRONOLACTONE 25 MG/1
TABLET ORAL
Qty: 45 TABLET | Refills: 0 | Status: SHIPPED | OUTPATIENT
Start: 2024-04-01

## 2024-04-01 NOTE — TELEPHONE ENCOUNTER
Caller: Fariba at [facility] pharmacy    Topic/issue: Wants to make sure that  Is aware of interaction for medication just sent over: spironolactone (ALDACTONE) 25 MG Tab and patient's other medication Benazepril. Requesting return call.    Callback Number: [phone]     Thank you,  Keisha NOYOLA

## 2024-04-03 NOTE — TELEPHONE ENCOUNTER
Returned ph call and left message for return ph call back.    Flower Padilla, Med Ass't  [facility]  Ph. [phone]  Fx. [phone]

## 2024-04-03 NOTE — TELEPHONE ENCOUNTER
Caller: Tim Fraire     Topic/issue: Returning Flower's call     Callback Number: [phone]    Thank You   Thea QUIJANO

## 2024-04-03 NOTE — TELEPHONE ENCOUNTER
Caller: Blanca @ [facility]     Topic/issue: Wants to make sure that  Is aware of interaction for medication just sent over: spironolactone (ALDACTONE) 25 MG Tab and patient's other medication Benazepril. Requesting return call.     Callback Number: [phone]    Thank You   Thea QUIJANO

## 2024-04-03 NOTE — TELEPHONE ENCOUNTER
Pharmacist at [facility], was concerned about interaction between spironolactone (ALDACTONE) 25 MG and benazepril (LOTENSIN) 40 MG.    And they won't refill spironolactone (ALDACTONE) 25 MG Tab and Pharmacy needs a call to confirm medication.    Please advise.    Flower Padilla, Med Ass't  [facility]  Ph. [phone]  Fx. [phone]

## 2024-04-04 NOTE — TELEPHONE ENCOUNTER
Caller: Shelley from [facility]    Topic/issue: Calling to check status of inquiry from 04/01/24. Requesting call back.     Callback Number: [phone]     Thank you,  Keisha NOYOLA

## 2024-04-04 NOTE — TELEPHONE ENCOUNTER
Pharmacy returned phone call and spoke with Rowena MCCLURE) and approved refill for spironolactone, per Dr Bloch's instructions.    Flower Padilla, Med Ass't  [facility]  Ph. [phone]  Fx. [phone]

## 2024-05-10 ENCOUNTER — HOSPITAL ENCOUNTER (OUTPATIENT)
Dept: LAB | Facility: MEDICAL CENTER | Age: 79
End: 2024-05-10
Attending: NURSE PRACTITIONER
Payer: MEDICARE

## 2024-05-10 DIAGNOSIS — E78.2 MIXED HYPERLIPIDEMIA: ICD-10-CM

## 2024-05-10 DIAGNOSIS — E87.1 HYPONATREMIA: ICD-10-CM

## 2024-05-10 DIAGNOSIS — I10 PRIMARY HYPERTENSION: ICD-10-CM

## 2024-05-10 LAB
ANION GAP SERPL CALC-SCNC: 11 MMOL/L (ref 7–16)
BUN SERPL-MCNC: 30 MG/DL (ref 8–22)
CALCIUM SERPL-MCNC: 9.2 MG/DL (ref 8.5–10.5)
CHLORIDE SERPL-SCNC: 108 MMOL/L (ref 96–112)
CHOLEST SERPL-MCNC: 190 MG/DL (ref 100–199)
CO2 SERPL-SCNC: 21 MMOL/L (ref 20–33)
CREAT SERPL-MCNC: 1.21 MG/DL (ref 0.5–1.4)
GFR SERPLBLD CREATININE-BSD FMLA CKD-EPI: 46 ML/MIN/1.73 M 2
GLUCOSE SERPL-MCNC: 93 MG/DL (ref 65–99)
HDLC SERPL-MCNC: 55 MG/DL
LDLC SERPL CALC-MCNC: 115 MG/DL
POTASSIUM SERPL-SCNC: 5.1 MMOL/L (ref 3.6–5.5)
SODIUM SERPL-SCNC: 140 MMOL/L (ref 135–145)
TRIGL SERPL-MCNC: 101 MG/DL (ref 0–149)

## 2024-05-22 ENCOUNTER — OFFICE VISIT (OUTPATIENT)
Dept: VASCULAR LAB | Facility: MEDICAL CENTER | Age: 79
End: 2024-05-22
Attending: NURSE PRACTITIONER
Payer: MEDICARE

## 2024-05-22 VITALS
WEIGHT: 161 LBS | HEART RATE: 76 BPM | SYSTOLIC BLOOD PRESSURE: 148 MMHG | DIASTOLIC BLOOD PRESSURE: 78 MMHG | HEIGHT: 66 IN | BODY MASS INDEX: 25.88 KG/M2

## 2024-05-22 DIAGNOSIS — E78.41 ELEVATED LIPOPROTEIN(A): ICD-10-CM

## 2024-05-22 DIAGNOSIS — E78.00 PRIMARY HYPERCHOLESTEROLEMIA: ICD-10-CM

## 2024-05-22 DIAGNOSIS — E78.2 MIXED HYPERLIPIDEMIA: Chronic | ICD-10-CM

## 2024-05-22 DIAGNOSIS — I48.0 PAF (PAROXYSMAL ATRIAL FIBRILLATION) (HCC): Chronic | ICD-10-CM

## 2024-05-22 DIAGNOSIS — I10 PRIMARY HYPERTENSION: ICD-10-CM

## 2024-05-22 PROCEDURE — 3078F DIAST BP <80 MM HG: CPT | Performed by: NURSE PRACTITIONER

## 2024-05-22 PROCEDURE — 3077F SYST BP >= 140 MM HG: CPT | Performed by: NURSE PRACTITIONER

## 2024-05-22 PROCEDURE — 99214 OFFICE O/P EST MOD 30 MIN: CPT | Performed by: NURSE PRACTITIONER

## 2024-05-22 RX ORDER — OLMESARTAN MEDOXOMIL 40 MG/1
40 TABLET ORAL DAILY
Qty: 100 TABLET | Refills: 3 | Status: SHIPPED | OUTPATIENT
Start: 2024-05-22

## 2024-05-22 ASSESSMENT — FIBROSIS 4 INDEX: FIB4 SCORE: 1.03

## 2024-05-22 NOTE — PROGRESS NOTES
FOLLOW UP VASCULAR MEDICINE VISIT    Chacha Fraire is a 79 yo female who presents 05/22/2024 for   Chief Complaint   Patient presents with    Follow-Up     initially referred by Angel Burgos M.D. for  eval and mgmt of HTN      Subjective      Interval hx/concerns:   Ongoing stress due to her son's esophageal cancer  Fatigue continues to be affecting her every day life  She has established with PCP and thyroid being tested  Never had a sleep study, denies snoring, headache, SOB    HTN:  Interval hx/concerns: no issues,   Not taking medications consistently   Home BP log:  variable readings, 118-167/60-80s.   Lifestyle factors:   Weight Change: -4lbs  BMI Readings from Last 5 Encounters:   05/22/24 25.99 kg/m²   01/18/24 25.50 kg/m²   01/17/24 25.50 kg/m²   12/06/23 24.59 kg/m²   11/21/23 24.75 kg/m²     Diet pattern changes since last visit: DASH   Daily salt intake estimate:  Low     EtOH: No  Exercise habits:  prior active, but limited d/t joint pain  Perceived barriers to care: joint pain, caregiving    Hyperlipidemia:  Stable, no changes.  On atorva 20mg, tolerating  Has not started Zetia- despite multiple recommendations   No change in myalgias    Afib:   Stable, no changes   Seeing Dr. Burgos (EP), s/p ablation.  No OAC therapy.  No current sx.    Has [employer] mobile to use for afib eval at home     Antiplatelet/anticoagulation:    On asa, no bleeding    CKD  Denies poor appetite, SOB,      PMR:  Seeing rheum; continues to have diffuse pain    Current Outpatient Medications:     olmesartan, 40 mg, Oral, DAILY    spironolactone, TAKE 1/2 TABLET BY MOUTH ONCE DAILY FOR 90 DAYS, Taking    verapamil SR, 120 mg, Oral, DAILY, Taking    Ascorbic Acid (SUPER C COMPLEX PO), 0, Taking    fluticasone, 0, Taking    Turmeric (QC TUMERIC COMPLEX PO), 1,000 mg, Oral, DAILY, Taking    celecoxib, 100 mg, Oral, BID, PRN    aspirin, 81 mg, Oral, DAILY, Taking    atorvastatin, 20 mg, Oral, Nightly, Taking    diclofenac sodium, 4  "g, Topical, 4X/DAY PRN, Taking    acetaminophen, 650 mg, Oral, Q6HRS PRN, Taking    omeprazole, 20 mg, Oral, DAILY, PRN    Psyllium (METAMUCIL PO), Take  by mouth., Taking    B Complex-Folic Acid (B COMPLEX PLUS PO), 1 Tablet, Oral, DAILY, Taking    loratadine, 10 mg, Oral, PRN, Taking    vitamin D, 1,000 Units, Oral, DAILY, Taking    famotidine, 20 mg, Oral, QDAY PRN, Taking     Allergies   Allergen Reactions    Crestor [Rosuvastatin Calcium]     Flagyl [Metronidazole] Nausea    Hydrochlorothiazide W-Triamterene      Other reaction(s): Other (See Comments)  hyponatremia    Rosuvastatin      Other reaction(s): .Unknown     No family history on file.     Social History     Tobacco Use    Smoking status: Never    Smokeless tobacco: Never   Vaping Use    Vaping status: Never Used   Substance Use Topics    Alcohol use: Yes     Alcohol/week: 1.8 oz     Types: 3 Glasses of wine per week    Drug use: Never         Objective     Vitals:    05/22/24 1047 05/22/24 1051   BP: (!) 165/76 (!) 148/78   BP Location: Left arm Left arm   Patient Position: Sitting Sitting   BP Cuff Size: Adult Adult   Pulse: 90 76   Weight: 73 kg (161 lb)    Height: 1.676 m (5' 6\")         BP Readings from Last 5 Encounters:   05/22/24 (!) 148/78   01/18/24 130/77   01/17/24 (!) 150/89   12/06/23 (!) 146/88   11/21/23 125/74      Body mass index is 25.99 kg/m².  Physical Exam  Vitals reviewed.   Constitutional:       General: She is not in acute distress.     Appearance: Normal appearance.   HENT:      Head: Normocephalic and atraumatic.   Eyes:      General: No scleral icterus.  Pulmonary:      Effort: Pulmonary effort is normal. No respiratory distress.   Musculoskeletal:         General: No swelling. Normal range of motion.      Cervical back: Normal range of motion.      Right lower leg: No edema.      Left lower leg: No edema.   Skin:     General: Skin is warm and dry.      Coloration: Skin is not pale.   Neurological:      General: No focal " deficit present.      Mental Status: She is alert and oriented to person, place, and time.      Coordination: Coordination normal.      Gait: Gait normal.   Psychiatric:         Mood and Affect: Mood normal.         Behavior: Behavior normal.       Lab Results   Component Value Date    CHOLSTRLTOT 190 05/10/2024     (H) 05/10/2024    HDL 55 05/10/2024    TRIGLYCERIDE 101 05/10/2024      Lab Results   Component Value Date/Time    LIPOPROTA 120 (H) 2022 10:34 AM             Lab Results   Component Value Date    SODIUM 140 05/10/2024    POTASSIUM 5.1 05/10/2024    CHLORIDE 108 05/10/2024    CO2 21 05/10/2024    GLUCOSE 93 05/10/2024    BUN 30 (H) 05/10/2024    CREATININE 1.21 05/10/2024        Lab Results   Component Value Date    WBC 10.1 2024    RBC 4.84 2024    HEMOGLOBIN 13.9 2024    HEMATOCRIT 43.2 2024    MCV 89.3 2024    MCH 28.7 2024    MCHC 32.2 2024    MPV 10.6 2024         Lab Results   Component Value Date/Time    MALBCRT see below 2023 11:28 AM    MICROALBUR <1.2 2023 11:28 AM      VASCULAR IMAGING:   Last EKG:   Results for orders placed or performed in visit on 23   EKG   Result Value Ref Range    Report       [facility]    Test Date:  2023  Pt Name:    CLARITA BUTLER                Department: [facility]  MRN:        [id number]                      Room:  Gender:     Female                       Technician: KENNEDY  :        1945                   Requested By:ANGEL BURGOS  Order #:    [id number]                    Reading MD: Angel Burgos MD    Measurements  Intervals                                Axis  Rate:       78                           P:          79  IN:         139                          QRS:        75  QRSD:       87                           T:          22  QT:         426  QTc:        486    Interpretive Statements  Sinus rhythm  Borderline prolonged QT interval  Compared to ECG 2023  12:49:24  Unchanged  Electronically Signed On 12- 13:19:46 PST by Angel Burgos MD       Echo 3/23/22   The left ventricular ejection fraction is visually estimated to be 65%.  No significant valvular Doppler abnormality.  Normal estimated right atrial pressure, unable to estimate RVSP.   No prior study is available for comparison.     LLE VR duplex 3/31/22   No LEFT lower extremity DVT or SVT.   No deep venous reflux demonstrated.    RLE venous 5/13/22   No deep or superficial venous abnormalities in the right leg.        Medical Decision Making:  Today's Assessment / Status / Plan:     1. Mixed hyperlipidemia  Lipid Profile      2. Primary hypercholesterolemia  Lipid Profile      3. Elevated lipoprotein(a)        4. Primary hypertension  olmesartan (BENICAR) 40 MG Tab    Basic Metabolic Panel      5. PAF (paroxysmal atrial fibrillation) ([facility])          Patient Type: Primary Prevention    Etiology of Established CVD if Present:     1) Afib, s/p ablation - stable   - ongoing care with cardiology for decision on OAC, rate/rhythm control and monitoring    BLOOD PRESSURE MANAGEMENT:  ACC/AHA (2017) goal <130/80, consider <140/80 reasonable due to age and prior med ADRs   Home BP at goal:  110-160s/60-80s, averaging out to 120s/70s ,mostly   Office BP at goal:  Yes   24h ABPM: 11/2023: 122/72 consistent with well-controlled out of office BP  Stopped doxazosin in the past for unclear reasons  Stressed importance of close adherence to medication regimen- contact our office before making changes  Stop-Bang score 3-- at intermediate risk for NADINE- discussed today, but declines referral currently- will reconsider in future   Plan:   Monitoring:   - continue home BP monitoring- 3-4 times per week; does not need to do daily as ABPM is reassuring-- remind pt again at next visit    - monitor lytes/gfr routinely   - contact office if BP consistently >140/>90 for discussion of tx adjustments   Medications:  - stop benazepril  40mg QHS-- due to perceived fatigue  - start olmesartan 40mg   DHP-CCB: continue to hold amlodipine d/t LE swelling  Thiazide: avoid due to hypoNa+  - continue spironolactone 12.5mg daily  - continue verapamil 120mg every AM    LIPID MANAGEMENT:   Qualifies for Statin Therapy Based on 2018 ACC/AHA Guidelines: yes, Primary Severe HLD / FH (baseline LDL-C >190)  The 10-year ASCVD risk score (Amanda DK, et al., 2019) is: 36%  High   - entire family with severe HLD, no premature ASCVD though, possible FH  Lp(a) = 120   Major ASCVD events: None  High-risk conditions: N/A  Risk-enhancers: Persistently elevated LDL-C >159 and Lp(a) >50  Currently on statin: yes, did not tolerate rosuvastatin or atorva at higher doses due to myalgias worsening  Treatment goals: reduce LDL-C >50%   At goal? No, approaching, 9/2023, slight elevated  - reasonable control   Previously taken off atorva d/t muscle pain, no changed noted upon stopping.  Has resumed atorva without any significant worsening.  Did not start zetia as previously      Plan:   - reinforced ongoing TLC measures as noted   - cont fiber and Vit D  Meds:   - continue atorvastatin 20mg daily  - start Zetia 10mg -- discussed in detail- she will start   - update labs, order at next appt    ANTITHROMBOTIC THERAPY:  continue asa 81mg daily but ok to take qod d/t consistent epistaxis with daily dosing    GLYCEMIC STATUS: Normal    LIFESTYLE INTERVENTIONS:    SMOKING:   reports that she has never smoked. She has never used smokeless tobacco.   - continued complete avoidance of all tobacco products     PHYSICAL ACTIVITY: encouraged healthy activity to improve CV fitness.  In general, targeting >150min/week of moderate-level activity or as much as tolerated    WEIGHT MANAGEMENT AND NUTRITION: continue DASH diet      OTHER:    # PMR - unclear if flare, per labs,per pt  Defer to rheum    # CKD stage 3a  GFR declining   Discussed increasing hydration, follow BP medication regimen  carefully   - Increase hydration  - Recheck GFR prior to next appt     Instructed to follow-up with PCP for remainder of adult medical needs: yes  We will partner with other providers in the management of established vascular disease and cardiometabolic risk factors.    Studies to Be Obtained: None   Labs to Be Obtained: lipid, BMP    Follow up in: 3 months     FATOU Broussard.  [facility]   [facility]   [phone]

## 2024-06-05 ENCOUNTER — OFFICE VISIT (OUTPATIENT)
Dept: CARDIOLOGY | Facility: MEDICAL CENTER | Age: 79
End: 2024-06-05
Attending: INTERNAL MEDICINE
Payer: MEDICARE

## 2024-06-05 VITALS
SYSTOLIC BLOOD PRESSURE: 124 MMHG | RESPIRATION RATE: 16 BRPM | HEIGHT: 66 IN | OXYGEN SATURATION: 95 % | WEIGHT: 160 LBS | DIASTOLIC BLOOD PRESSURE: 70 MMHG | BODY MASS INDEX: 25.71 KG/M2 | HEART RATE: 84 BPM

## 2024-06-05 DIAGNOSIS — I48.0 PAF (PAROXYSMAL ATRIAL FIBRILLATION) (HCC): Chronic | ICD-10-CM

## 2024-06-05 DIAGNOSIS — Z98.890 S/P ABLATION OF ATRIAL FIBRILLATION: ICD-10-CM

## 2024-06-05 DIAGNOSIS — I10 PRIMARY HYPERTENSION: ICD-10-CM

## 2024-06-05 DIAGNOSIS — E78.00 PRIMARY HYPERCHOLESTEROLEMIA: ICD-10-CM

## 2024-06-05 DIAGNOSIS — Z86.79 S/P ABLATION OF ATRIAL FIBRILLATION: ICD-10-CM

## 2024-06-05 DIAGNOSIS — M35.3 PMR (POLYMYALGIA RHEUMATICA) (HCC): ICD-10-CM

## 2024-06-05 LAB — EKG IMPRESSION: NORMAL

## 2024-06-05 PROCEDURE — 99213 OFFICE O/P EST LOW 20 MIN: CPT | Performed by: INTERNAL MEDICINE

## 2024-06-05 PROCEDURE — 99214 OFFICE O/P EST MOD 30 MIN: CPT | Mod: 25 | Performed by: INTERNAL MEDICINE

## 2024-06-05 PROCEDURE — 93005 ELECTROCARDIOGRAM TRACING: CPT | Performed by: INTERNAL MEDICINE

## 2024-06-05 PROCEDURE — 93010 ELECTROCARDIOGRAM REPORT: CPT | Performed by: INTERNAL MEDICINE

## 2024-06-05 PROCEDURE — 3078F DIAST BP <80 MM HG: CPT | Performed by: INTERNAL MEDICINE

## 2024-06-05 PROCEDURE — 3074F SYST BP LT 130 MM HG: CPT | Performed by: INTERNAL MEDICINE

## 2024-06-05 ASSESSMENT — FIBROSIS 4 INDEX: FIB4 SCORE: 1.03

## 2024-06-05 NOTE — PROGRESS NOTES
Chief Complaint   Patient presents with    Hypertension     F/V DX:   Primary hypertension          Subjective     Tim Fraire is a 78 y.o. female who presents today with somewhat resistant hypertension followed by vascular medicine.  Recent switch from ACE inhibitor to ARB.  Possibly fatigue related.  No chest pain no palpitations under stress related to sons diagnosis.    Past Medical History:   Diagnosis Date    Arthritis     Polymyalgia Rheumatica; off prednisone since 2/7/22    Benign essential HTN 5/29/2012    DDD (degenerative disc disease), lumbar     Heart burn     High cholesterol     Hyperlipidemia 5/29/2012    PAF (paroxysmal atrial fibrillation) (HCC) 5/29/2012    Ablations x 2    Personal history of prior ablation treatment 5/29/2012    PONV (postoperative nausea and vomiting)      Past Surgical History:   Procedure Laterality Date    PB TOTAL KNEE ARTHROPLASTY Right 4/27/2022    Procedure: ARTHROPLASTY, KNEE, TOTAL;  Surgeon: Pedro Ibanez M.D.;  Location: SURGERY [facility];  Service: Orthopedics     No family history on file.  Social History     Socioeconomic History    Marital status:      Spouse name: Not on file    Number of children: Not on file    Years of education: Not on file    Highest education level: Not on file   Occupational History    Not on file   Tobacco Use    Smoking status: Never    Smokeless tobacco: Never   Vaping Use    Vaping status: Never Used   Substance and Sexual Activity    Alcohol use: Yes     Alcohol/week: 1.8 oz     Types: 3 Glasses of wine per week    Drug use: Never    Sexual activity: Not on file   Other Topics Concern    Not on file   Social History Narrative    Not on file     Social Determinants of Health     Financial Resource Strain: Not on file   Food Insecurity: Not on file   Transportation Needs: Not on file   Physical Activity: Not on file   Stress: Not on file   Social Connections: Not on file   Intimate Partner Violence: Not on file    Housing Stability: Not on file     Allergies   Allergen Reactions    Crestor [Rosuvastatin Calcium]     Flagyl [Metronidazole] Nausea    Hydrochlorothiazide W-Triamterene      Other reaction(s): Other (See Comments)  hyponatremia    Rosuvastatin      Other reaction(s): .Unknown     Outpatient Encounter Medications as of 6/5/2024   Medication Sig Dispense Refill    olmesartan (BENICAR) 40 MG Tab Take 1 Tablet by mouth every day. 100 Tablet 3    spironolactone (ALDACTONE) 25 MG Tab TAKE 1/2 TABLET BY MOUTH ONCE DAILY FOR 90 DAYS 45 Tablet 0    verapamil SR (CALAN SR) 120 MG CR tablet Take 1 tablet by mouth once daily 90 Tablet 3    Ascorbic Acid (SUPER C COMPLEX PO) 0      fluticasone (FLONASE ALLERGY RELIEF) 50 MCG/ACT nasal spray 0      Turmeric (QC TUMERIC COMPLEX PO) Take 1,000 mg by mouth every day.      celecoxib (CELEBREX) 100 MG Cap Take 1 Capsule by mouth 2 times a day. 60 Capsule 2    aspirin 81 MG EC tablet Take 81 mg by mouth every day. Only as needed      atorvastatin (LIPITOR) 20 MG Tab Take 1 Tablet by mouth every evening. 90 Tablet 3    diclofenac sodium ([employer] DICLOFENAC SODIUM) 1 % Gel Apply 4 g topically 4 times a day as needed (as needed for knee or back pain (or hip pain)). 350 g 3    acetaminophen (TYLENOL) 650 MG CR tablet Take 650 mg by mouth every 6 hours as needed.      omeprazole (PRILOSEC) 20 MG delayed-release capsule Take 20 mg by mouth every day.      Psyllium (METAMUCIL PO) Take  by mouth.      B Complex-Folic Acid (B COMPLEX PLUS PO) Take 1 Tablet by mouth every day.      loratadine (CLARITIN) 10 MG Tab Take 10 mg by mouth as needed.      vitamin D (CHOLECALCIFEROL) 1000 UNIT TABS Take 1,000 Units by mouth every day. D3      famotidine (PEPCID) 20 MG Tab Take 20 mg by mouth 1 time daily as needed.       No facility-administered encounter medications on file as of 6/5/2024.     ROS           Objective     /70 (BP Location: Left arm, Patient Position: Sitting, BP Cuff Size: Adult)   " Pulse 84   Resp 16   Ht 1.676 m (5' 6\")   Wt 72.6 kg (160 lb)   SpO2 95%   BMI 25.82 kg/m²     Physical Exam  Constitutional:       Appearance: She is well-developed.   HENT:      Head: Normocephalic and atraumatic.   Eyes:      Pupils: Pupils are equal, round, and reactive to light.   Cardiovascular:      Rate and Rhythm: Normal rate and regular rhythm.      Heart sounds: Murmur heard.      No friction rub. No gallop.   Pulmonary:      Effort: Pulmonary effort is normal.      Breath sounds: Normal breath sounds.   Abdominal:      General: Bowel sounds are normal.      Palpations: Abdomen is soft.   Musculoskeletal:         General: Normal range of motion.      Cervical back: Normal range of motion and neck supple.   Skin:     General: Skin is warm.   Neurological:      Mental Status: She is alert and oriented to person, place, and time.      Cranial Nerves: No cranial nerve deficit.   Psychiatric:         Behavior: Behavior normal.         Thought Content: Thought content normal.         Judgment: Judgment normal.                Assessment & Plan     1. Primary hypertension  EKG      2. S/P ablation of atrial fibrillation        3. Primary hypercholesterolemia        4. PAF (paroxysmal atrial fibrillation) ([facility])  EC-ECHOCARDIOGRAM COMPLETE W/O CONT      5. PMR (polymyalgia rheumatica) ([facility])            Medical Decision Making: Today's Assessment/Status/Plan:   1.  Fatigue and cardiac murmur in aortic region.  Echocardiogram unremarkable in 2022.  Recheck.  2.  Previous PMR normal sed rate    3.  Hypertension followed by vascular medicine.  4.  Hyperlipidemia on statin and Zetia.  5.  Previous A-fib status post ablation no recurrence.  6.  Follow-up with me in 6 months.                     "

## 2024-06-28 ENCOUNTER — HOSPITAL ENCOUNTER (OUTPATIENT)
Dept: LAB | Facility: MEDICAL CENTER | Age: 79
End: 2024-06-28
Payer: MEDICARE

## 2024-06-28 LAB
25(OH)D3 SERPL-MCNC: 35 NG/ML (ref 30–100)
FOLATE SERPL-MCNC: 23 NG/ML
T4 FREE SERPL-MCNC: 1.12 NG/DL (ref 0.93–1.7)
TSH SERPL DL<=0.005 MIU/L-ACNC: 0.7 UIU/ML (ref 0.38–5.33)
VIT B12 SERPL-MCNC: 563 PG/ML (ref 211–911)

## 2024-06-28 PROCEDURE — 36415 COLL VENOUS BLD VENIPUNCTURE: CPT

## 2024-06-28 PROCEDURE — 84443 ASSAY THYROID STIM HORMONE: CPT

## 2024-06-28 PROCEDURE — 82746 ASSAY OF FOLIC ACID SERUM: CPT

## 2024-06-28 PROCEDURE — 84439 ASSAY OF FREE THYROXINE: CPT

## 2024-06-28 PROCEDURE — 82306 VITAMIN D 25 HYDROXY: CPT | Mod: GA

## 2024-06-28 PROCEDURE — 82607 VITAMIN B-12: CPT

## 2024-08-02 ENCOUNTER — HOSPITAL ENCOUNTER (OUTPATIENT)
Dept: LAB | Facility: MEDICAL CENTER | Age: 79
End: 2024-08-02
Attending: NURSE PRACTITIONER
Payer: MEDICARE

## 2024-08-02 DIAGNOSIS — R20.2 NUMBNESS AND TINGLING IN BOTH HANDS: ICD-10-CM

## 2024-08-02 DIAGNOSIS — M35.3 POLYMYALGIA RHEUMATICA (HCC): ICD-10-CM

## 2024-08-02 DIAGNOSIS — R20.0 NUMBNESS AND TINGLING IN BOTH HANDS: ICD-10-CM

## 2024-08-02 LAB
ALBUMIN SERPL BCP-MCNC: 4 G/DL (ref 3.2–4.9)
ALBUMIN/GLOB SERPL: 1.5 G/DL
ALP SERPL-CCNC: 87 U/L (ref 30–99)
ALT SERPL-CCNC: 30 U/L (ref 2–50)
ANION GAP SERPL CALC-SCNC: 12 MMOL/L (ref 7–16)
AST SERPL-CCNC: 27 U/L (ref 12–45)
BASOPHILS # BLD AUTO: 1 % (ref 0–1.8)
BASOPHILS # BLD: 0.08 K/UL (ref 0–0.12)
BILIRUB SERPL-MCNC: 0.5 MG/DL (ref 0.1–1.5)
BUN SERPL-MCNC: 30 MG/DL (ref 8–22)
CALCIUM ALBUM COR SERPL-MCNC: 9.3 MG/DL (ref 8.5–10.5)
CALCIUM SERPL-MCNC: 9.3 MG/DL (ref 8.5–10.5)
CHLORIDE SERPL-SCNC: 110 MMOL/L (ref 96–112)
CO2 SERPL-SCNC: 21 MMOL/L (ref 20–33)
CREAT SERPL-MCNC: 1.11 MG/DL (ref 0.5–1.4)
CRP SERPL HS-MCNC: <0.3 MG/DL (ref 0–0.75)
EOSINOPHIL # BLD AUTO: 0.23 K/UL (ref 0–0.51)
EOSINOPHIL NFR BLD: 2.8 % (ref 0–6.9)
ERYTHROCYTE [DISTWIDTH] IN BLOOD BY AUTOMATED COUNT: 46.5 FL (ref 35.9–50)
ERYTHROCYTE [SEDIMENTATION RATE] IN BLOOD BY WESTERGREN METHOD: 12 MM/HOUR (ref 0–25)
GFR SERPLBLD CREATININE-BSD FMLA CKD-EPI: 51 ML/MIN/1.73 M 2
GLOBULIN SER CALC-MCNC: 2.6 G/DL (ref 1.9–3.5)
GLUCOSE SERPL-MCNC: 83 MG/DL (ref 65–99)
HCT VFR BLD AUTO: 39.9 % (ref 37–47)
HGB BLD-MCNC: 12.9 G/DL (ref 12–16)
IMM GRANULOCYTES # BLD AUTO: 0.02 K/UL (ref 0–0.11)
IMM GRANULOCYTES NFR BLD AUTO: 0.2 % (ref 0–0.9)
LYMPHOCYTES # BLD AUTO: 1.36 K/UL (ref 1–4.8)
LYMPHOCYTES NFR BLD: 16.7 % (ref 22–41)
MCH RBC QN AUTO: 29.5 PG (ref 27–33)
MCHC RBC AUTO-ENTMCNC: 32.3 G/DL (ref 32.2–35.5)
MCV RBC AUTO: 91.3 FL (ref 81.4–97.8)
MONOCYTES # BLD AUTO: 0.62 K/UL (ref 0–0.85)
MONOCYTES NFR BLD AUTO: 7.6 % (ref 0–13.4)
NEUTROPHILS # BLD AUTO: 5.83 K/UL (ref 1.82–7.42)
NEUTROPHILS NFR BLD: 71.7 % (ref 44–72)
NRBC # BLD AUTO: 0 K/UL
NRBC BLD-RTO: 0 /100 WBC (ref 0–0.2)
PLATELET # BLD AUTO: 258 K/UL (ref 164–446)
PMV BLD AUTO: 10.7 FL (ref 9–12.9)
POTASSIUM SERPL-SCNC: 4.2 MMOL/L (ref 3.6–5.5)
PROT SERPL-MCNC: 6.6 G/DL (ref 6–8.2)
RBC # BLD AUTO: 4.37 M/UL (ref 4.2–5.4)
SODIUM SERPL-SCNC: 143 MMOL/L (ref 135–145)
WBC # BLD AUTO: 8.1 K/UL (ref 4.8–10.8)

## 2024-08-02 PROCEDURE — 86140 C-REACTIVE PROTEIN: CPT

## 2024-08-02 PROCEDURE — 85025 COMPLETE CBC W/AUTO DIFF WBC: CPT

## 2024-08-02 PROCEDURE — 80053 COMPREHEN METABOLIC PANEL: CPT

## 2024-08-02 PROCEDURE — 85652 RBC SED RATE AUTOMATED: CPT

## 2024-08-02 PROCEDURE — 36415 COLL VENOUS BLD VENIPUNCTURE: CPT

## 2024-08-26 ENCOUNTER — HOSPITAL ENCOUNTER (OUTPATIENT)
Dept: CARDIOLOGY | Facility: MEDICAL CENTER | Age: 79
End: 2024-08-26
Attending: INTERNAL MEDICINE
Payer: MEDICARE

## 2024-08-26 ENCOUNTER — HOSPITAL ENCOUNTER (OUTPATIENT)
Dept: LAB | Facility: MEDICAL CENTER | Age: 79
End: 2024-08-26
Attending: NURSE PRACTITIONER
Payer: MEDICARE

## 2024-08-26 DIAGNOSIS — I48.0 PAF (PAROXYSMAL ATRIAL FIBRILLATION) (HCC): Chronic | ICD-10-CM

## 2024-08-26 DIAGNOSIS — E78.2 MIXED HYPERLIPIDEMIA: ICD-10-CM

## 2024-08-26 DIAGNOSIS — E78.2 MIXED HYPERLIPIDEMIA: Chronic | ICD-10-CM

## 2024-08-26 DIAGNOSIS — I10 PRIMARY HYPERTENSION: ICD-10-CM

## 2024-08-26 DIAGNOSIS — E78.00 PRIMARY HYPERCHOLESTEROLEMIA: ICD-10-CM

## 2024-08-26 LAB
ANION GAP SERPL CALC-SCNC: 12 MMOL/L (ref 7–16)
BUN SERPL-MCNC: 24 MG/DL (ref 8–22)
CALCIUM SERPL-MCNC: 9.3 MG/DL (ref 8.5–10.5)
CHLORIDE SERPL-SCNC: 108 MMOL/L (ref 96–112)
CHOLEST SERPL-MCNC: 143 MG/DL (ref 100–199)
CO2 SERPL-SCNC: 22 MMOL/L (ref 20–33)
CREAT SERPL-MCNC: 1.1 MG/DL (ref 0.5–1.4)
GFR SERPLBLD CREATININE-BSD FMLA CKD-EPI: 51 ML/MIN/1.73 M 2
GLUCOSE SERPL-MCNC: 87 MG/DL (ref 65–99)
HDLC SERPL-MCNC: 63 MG/DL
LDLC SERPL CALC-MCNC: 68 MG/DL
LV EJECT FRACT  99904: 60
LV EJECT FRACT MOD 2C 99903: 60.5
LV EJECT FRACT MOD 4C 99902: 59.75
LV EJECT FRACT MOD BP 99901: 60.16
POTASSIUM SERPL-SCNC: 4.5 MMOL/L (ref 3.6–5.5)
SODIUM SERPL-SCNC: 142 MMOL/L (ref 135–145)
TRIGL SERPL-MCNC: 58 MG/DL (ref 0–149)

## 2024-08-26 PROCEDURE — 93306 TTE W/DOPPLER COMPLETE: CPT

## 2024-08-26 PROCEDURE — 80048 BASIC METABOLIC PNL TOTAL CA: CPT

## 2024-08-26 PROCEDURE — 36415 COLL VENOUS BLD VENIPUNCTURE: CPT

## 2024-08-26 PROCEDURE — 80061 LIPID PANEL: CPT

## 2024-08-26 RX ORDER — ATORVASTATIN CALCIUM 20 MG/1
20 TABLET, FILM COATED ORAL NIGHTLY
Qty: 100 TABLET | Refills: 3 | Status: SHIPPED | OUTPATIENT
Start: 2024-08-26

## 2024-08-26 NOTE — TELEPHONE ENCOUNTER
Received request via: Pharmacy    Was the patient seen in the last year in this department? Yes    Does the patient have an active prescription (recently filled or refills available) for medication(s) requested? No    Pharmacy Name: [facility]    Does the patient have assisted Plus and need 100-day supply? (This applies to ALL medications) Patient does not have SCP    Flower Padilla, Med Ass't  [facility]  Ph. [phone]  Fx. [phone]

## 2024-08-30 DIAGNOSIS — I10 PRIMARY HYPERTENSION: ICD-10-CM

## 2024-08-30 NOTE — PROGRESS NOTES
Established patient  Chart prep for upcoming appointment.    Any pending/incomplete orders from last visit? No, all orders completed.  Was patient called and reminded to complete pending orders? N/A orders complete  Were any records requested?  No    Referral up to date? Yes renewal ordered, sent to provider to co-sign, AND new referral attached to upcoming appointment.    Referral attached to appointment (renewals and New patients only)? Yes  Virtual appointment? No          Rowena Nicholas, Medical Assistant   [facility]   Ph: [phone]  Fx: [phone]

## 2024-09-05 ENCOUNTER — OFFICE VISIT (OUTPATIENT)
Dept: VASCULAR LAB | Facility: MEDICAL CENTER | Age: 79
End: 2024-09-05
Attending: NURSE PRACTITIONER
Payer: MEDICARE

## 2024-09-05 VITALS
HEART RATE: 75 BPM | SYSTOLIC BLOOD PRESSURE: 142 MMHG | WEIGHT: 160 LBS | BODY MASS INDEX: 25.11 KG/M2 | HEIGHT: 67 IN | DIASTOLIC BLOOD PRESSURE: 73 MMHG

## 2024-09-05 DIAGNOSIS — E78.41 ELEVATED LIPOPROTEIN(A): ICD-10-CM

## 2024-09-05 DIAGNOSIS — I10 PRIMARY HYPERTENSION: ICD-10-CM

## 2024-09-05 DIAGNOSIS — Z79.02 LONG TERM (CURRENT) USE OF ANTITHROMBOTICS/ANTIPLATELETS: ICD-10-CM

## 2024-09-05 DIAGNOSIS — E78.2 MIXED HYPERLIPIDEMIA: Chronic | ICD-10-CM

## 2024-09-05 DIAGNOSIS — E78.00 PRIMARY HYPERCHOLESTEROLEMIA: ICD-10-CM

## 2024-09-05 PROCEDURE — 3078F DIAST BP <80 MM HG: CPT | Performed by: NURSE PRACTITIONER

## 2024-09-05 PROCEDURE — 99214 OFFICE O/P EST MOD 30 MIN: CPT | Performed by: NURSE PRACTITIONER

## 2024-09-05 PROCEDURE — 99212 OFFICE O/P EST SF 10 MIN: CPT

## 2024-09-05 PROCEDURE — G2211 COMPLEX E/M VISIT ADD ON: HCPCS | Performed by: NURSE PRACTITIONER

## 2024-09-05 PROCEDURE — 3077F SYST BP >= 140 MM HG: CPT | Performed by: NURSE PRACTITIONER

## 2024-09-05 RX ORDER — EZETIMIBE 10 MG/1
10 TABLET ORAL DAILY
Qty: 100 TABLET | Refills: 3 | Status: SHIPPED | OUTPATIENT
Start: 2024-09-05

## 2024-09-05 ASSESSMENT — FIBROSIS 4 INDEX: FIB4 SCORE: 1.49

## 2024-09-05 NOTE — PROGRESS NOTES
FOLLOW UP VASCULAR MEDICINE VISIT    Chacha Fraire is a 77 yo female who presents 09/05/2024 for   Chief Complaint   Patient presents with    Follow-Up     initially referred by Angel Burgos M.D. for  eval and mgmt of HTN      Subjective      Interval hx/concerns:   Ongoing stress due to her son's esophageal cancer  Fatigue continues to be affecting her every day life  Has had her thyroid checked  Has a PCP in [place]   Never had a sleep study, denies snoring, headache, SOB    HTN:  Interval hx/concerns: no issues,   Not taking medications consistently   Home BP log:  variable readings, 118-167/60-80s.   Lifestyle factors:   Weight Change: -4lbs  BMI Readings from Last 5 Encounters:   09/05/24 25.06 kg/m²   08/07/24 27.66 kg/m²   06/05/24 25.82 kg/m²   06/05/24 25.50 kg/m²   05/22/24 25.99 kg/m²     Diet pattern changes since last visit: DASH   Daily salt intake estimate:  Low     EtOH: No  Exercise habits:  prior active, but limited d/t joint pain  Perceived barriers to care: joint pain, caregiving    Hyperlipidemia:  Stable, no changes.  On atorva 20mg, tolerating  Tolerating Zetia  No change in myalgias    Afib:   Stable, no changes   Seeing Dr. Burgos (EP), s/p ablation.  No OAC therapy.  No current sx.    Has [employer] to use for afib eval at home     Antiplatelet/anticoagulation:    On asa, no bleeding    CKD  Denies poor appetite, SOB,      PMR:  Seeing rheum; continues to have diffuse pain    Current Outpatient Medications:     ezetimibe, 10 mg, Oral, DAILY    atorvastatin, 20 mg, Oral, Nightly, Taking    amoxicillin, TAKE FOUR CAPSULES BY MOUTH ONE HOUR BEFORE APPOINTMENT, Taking    olmesartan, 40 mg, Oral, DAILY, Taking    spironolactone, TAKE 1/2 TABLET BY MOUTH ONCE DAILY FOR 90 DAYS, Taking    verapamil SR, 120 mg, Oral, DAILY, Taking    Ascorbic Acid (SUPER C COMPLEX PO), 0, Taking    fluticasone, 0, Taking    Turmeric (QC TUMERIC COMPLEX PO), 1,000 mg, Oral, DAILY, Taking    celecoxib, 100  "mg, Oral, BID, Taking    aspirin, 81 mg, Oral, DAILY, Taking    diclofenac sodium, 4 g, Topical, 4X/DAY PRN, Taking    acetaminophen, 650 mg, Oral, Q6HRS PRN, Taking    Psyllium (METAMUCIL PO), Take  by mouth., Taking    B Complex-Folic Acid (B COMPLEX PLUS PO), 1 Tablet, Oral, DAILY, Taking    loratadine, 10 mg, Oral, PRN, Taking    vitamin D, 1,000 Units, Oral, DAILY, Taking    famotidine, 20 mg, Oral, QDAY PRN, Taking     Allergies   Allergen Reactions    Crestor [Rosuvastatin Calcium]     Flagyl [Metronidazole] Nausea    Hydrochlorothiazide W-Triamterene      Other reaction(s): Other (See Comments)  hyponatremia    Rosuvastatin      Other reaction(s): .Unknown     Social History     Tobacco Use    Smoking status: Never    Smokeless tobacco: Never   Vaping Use    Vaping status: Never Used   Substance Use Topics    Alcohol use: Yes     Alcohol/week: 1.8 oz     Types: 3 Glasses of wine per week    Drug use: Never         Objective     Vitals:    09/05/24 0955 09/05/24 0959   BP: (!) 142/89 (!) 142/73   BP Location: Left arm Left arm   Patient Position: Sitting Sitting   BP Cuff Size: Adult Adult   Pulse: 71 75   Weight: 72.6 kg (160 lb)    Height: 1.702 m (5' 7\")      BP Readings from Last 5 Encounters:   09/05/24 (!) 142/73   08/07/24 (!) 156/88   06/05/24 124/70   06/05/24 (!) 159/85   05/22/24 (!) 148/78      Body mass index is 25.06 kg/m².  Physical Exam  Vitals reviewed.   Constitutional:       General: She is not in acute distress.     Appearance: Normal appearance.   HENT:      Head: Normocephalic and atraumatic.   Eyes:      General: No scleral icterus.  Pulmonary:      Effort: Pulmonary effort is normal. No respiratory distress.   Musculoskeletal:         General: No swelling. Normal range of motion.      Cervical back: Normal range of motion.      Right lower leg: No edema.      Left lower leg: No edema.   Skin:     General: Skin is warm and dry.      Coloration: Skin is not pale.   Neurological:      " General: No focal deficit present.      Mental Status: She is alert and oriented to person, place, and time.      Coordination: Coordination normal.      Gait: Gait normal.   Psychiatric:         Mood and Affect: Mood normal.         Behavior: Behavior normal.       Lab Results   Component Value Date    CHOLSTRLTOT 143 2024    LDL 68 2024    HDL 63 2024    TRIGLYCERIDE 58 2024      Lab Results   Component Value Date/Time    LIPOPROTA 120 (H) 2022 10:34 AM             Lab Results   Component Value Date    SODIUM 142 2024    POTASSIUM 4.5 2024    CHLORIDE 108 2024    CO2 22 2024    GLUCOSE 87 2024    BUN 24 (H) 2024    CREATININE 1.10 2024        Lab Results   Component Value Date    WBC 8.1 2024    RBC 4.37 2024    HEMOGLOBIN 12.9 2024    HEMATOCRIT 39.9 2024    MCV 91.3 2024    MCH 29.5 2024    MCHC 32.3 2024    MPV 10.7 2024         Lab Results   Component Value Date/Time    MALBCRT see below 2023 11:28 AM    MICROALBUR <1.2 2023 11:28 AM      VASCULAR IMAGING:   Last EKG:   Results for orders placed or performed in visit on 24   EKG   Result Value Ref Range    Report       [facility] [place] [facility]    Test Date:  2024  Pt Name:    CLARITA BUTLER                Department: [facility]  MRN:        [id number]                      Room:  Gender:     Female                       Technician: AYO  :        [id number]                   Requested By:ANGEL BURGOS  Order #:    [id number]                    Reading MD: Angel Burgos MD    Measurements  Intervals                                Axis  Rate:       81                           P:          70  HI:         137                          QRS:        45  QRSD:       92                           T:          51  QT:         388  QTc:        451    Interpretive Statements  Sinus rhythm  Low voltage, precordial leads  Compared to ECG  12/06/2023 12:52:47  Low QRS voltage now present    Electronically Signed On 06- 13:25:56 PDT by Angel Burgos MD       Echo 3/23/22   The left ventricular ejection fraction is visually estimated to be 65%.  No significant valvular Doppler abnormality.  Normal estimated right atrial pressure, unable to estimate RVSP.   No prior study is available for comparison.     LLE VR duplex 3/31/22   No LEFT lower extremity DVT or SVT.   No deep venous reflux demonstrated.    RLE venous 5/13/22   No deep or superficial venous abnormalities in the right leg.     Echo 8/2024  Compared to the prior study on 3/23/22. Unchanged.  Normal left ventricular chamber size.  Normal left ventricular systolic function.  Trace mitral regurgitation.  Trace tricuspid regurgitation.       Medical Decision Making:  Today's Assessment / Status / Plan:     1. Mixed hyperlipidemia  ezetimibe (ZETIA) 10 MG Tab    Lipid Profile      2. Primary hypertension  Comp Metabolic Panel    MICROALBUMIN CREAT RATIO URINE      3. Elevated lipoprotein(a)        4. Primary hypercholesterolemia        5. Long term (current) use of antithrombotics/antiplatelets          Patient Type: Primary Prevention    Etiology of Established CVD if Present:     1) Afib, s/p ablation - stable   - ongoing care with cardiology for decision on OAC, rate/rhythm control and monitoring    BLOOD PRESSURE MANAGEMENT:  ACC/AHA (2017) goal <130/80, consider <140/80 reasonable due to age and prior med ADRs   Home BP at goal:  110-160s/60-80s, averaging out to 120s/70s ,mostly   Office BP at goal:  Yes   24h ABPM: 11/2023: 122/72 consistent with well-controlled out of office BP  Stopped doxazosin in the past for unclear reasons  Benazepril caused perceived fatigue  Stressed importance of close adherence to medication regimen- contact our office before making changes  Plan:   Monitoring:   - continue home BP monitoring- 3-4 times per week; does not need to do daily as ABPM is  reassuring  - monitor lytes/gfr routinely   - contact office if BP consistently >140/>90 for discussion of tx adjustments   Medications:  - continue olmesartan 40mg   DHP-CCB: continue to hold amlodipine d/t LE swelling  Thiazide: avoid due to hypoNa+  - continue spironolactone 12.5mg daily  - continue verapamil 120mg every AM    LIPID MANAGEMENT:   Qualifies for Statin Therapy Based on 2018 ACC/AHA Guidelines: yes, Primary Severe HLD / FH (baseline LDL-C >190)  The 10-year ASCVD risk score (Amanda JACK, et al., 2019) is: 34.4%  High   - entire family with severe HLD, no premature ASCVD though, possible FH  Lp(a) = 120   Major ASCVD events: None  High-risk conditions: N/A  Risk-enhancers: Persistently elevated LDL-C >159 and Lp(a) >50  Currently on statin: yes, did not tolerate rosuvastatin or atorva at higher doses due to myalgias worsening  Treatment goals: reduce LDL-C >50%   At goal? Yes, much improved with addition of zetia    Previously taken off atorva d/t muscle pain, no changed noted upon stopping.    Has resumed atorva without any significant worsening.    Plan:   - reinforced ongoing TLC measures as noted   - cont fiber and Vit D  Meds:   - continue atorvastatin 20mg daily  - continue Zetia 10mg  - check lipid in 6 months     ANTITHROMBOTIC THERAPY:  continue asa 81mg daily but ok to take qod d/t consistent epistaxis with daily dosing    GLYCEMIC STATUS: Normal    LIFESTYLE INTERVENTIONS:    SMOKING:   reports that she has never smoked. She has never used smokeless tobacco.   - continued complete avoidance of all tobacco products     PHYSICAL ACTIVITY: encouraged increase more walking to improve CV fitness.  In general, targeting >150min/week of moderate-level activity or as much as tolerated.    WEIGHT MANAGEMENT AND NUTRITION: continue DASH diet      OTHER:    # PMR - unclear if flare, per labs,per pt  Defer to rheum    # CKD stage 3a  GFR declining   Discussed increasing hydration, follow BP medication  regimen carefully   - Increase hydration  - Recheck GFR prior to next appt     # fatigue, ongoing.    As discussed with pt I do not feel this is being caused by her medications.  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  TSH was WNL.  Stop-Bang score 3-- at intermediate risk for NADINE- discussed again today, but declines referral- could consider home oximetry test-- she will discuss with her PCP.  Echo unchanged from previous.    - Defer further work up to PCP     Instructed to follow-up with PCP for remainder of adult medical needs: yes  We will partner with other providers in the management of established vascular disease and cardiometabolic risk factors.    Studies to Be Obtained: None   Labs to Be Obtained: lipid, CMP, microalbumin     Follow up in: 6 months     FATOU Broussard.  [facility]   [facility]   [phone]

## 2024-09-16 ENCOUNTER — TELEPHONE (OUTPATIENT)
Dept: VASCULAR LAB | Facility: MEDICAL CENTER | Age: 79
End: 2024-09-16
Payer: MEDICARE

## 2024-09-17 NOTE — TELEPHONE ENCOUNTER
Received Refill PA request via [employer]  for EZETIMIBE 10MG TABLETS . (Quantity:90, Day Supply:90)     Insurance: [employer] Rx D   Member ID:  [id number]  BIN: [id number]  PCN: [id number]  Group: [id number]     Ran Test claim via [place] & medication Pays for a $31.55/90DS ; $11.02/30DS copay. Will outreach to patient to offer specialty pharmacy services and or release to preferred pharmacy    NEHAL Lu, PhT  Vascular Pharmacy Liaison (Rx Coordinator)  P: [phone]  9/16/2024 5:04 PM

## 2024-09-19 NOTE — TELEPHONE ENCOUNTER
S/w pt today to offer [facility] pharmacy services. Per pt, she states that she goes in and out of her house due to some planned trips, and appointments, and she feels like picking up medication should be convenient for her at this time. Pt is aware with her copay for the medication and prefers to  a 3 month supply, rather than a 1 month supply.     Releasing Rx to pharmacy on file: [facility] [id number]---[address] [place] [address]    NEHAL Lu, PhT  Vascular Pharmacy Liaison (Rx Coordinator)  P: [phone]  9/18/2024 5:15 PM

## 2025-01-14 ENCOUNTER — OFFICE VISIT (OUTPATIENT)
Dept: CARDIOLOGY | Facility: MEDICAL CENTER | Age: 80
End: 2025-01-14
Attending: INTERNAL MEDICINE
Payer: MEDICARE

## 2025-01-14 VITALS
DIASTOLIC BLOOD PRESSURE: 80 MMHG | OXYGEN SATURATION: 97 % | WEIGHT: 164 LBS | HEIGHT: 67 IN | SYSTOLIC BLOOD PRESSURE: 134 MMHG | RESPIRATION RATE: 16 BRPM | BODY MASS INDEX: 25.74 KG/M2 | HEART RATE: 78 BPM

## 2025-01-14 DIAGNOSIS — Z86.79 S/P ABLATION OF ATRIAL FIBRILLATION: ICD-10-CM

## 2025-01-14 DIAGNOSIS — Z98.890 S/P ABLATION OF ATRIAL FIBRILLATION: ICD-10-CM

## 2025-01-14 DIAGNOSIS — E78.2 MIXED HYPERLIPIDEMIA: Chronic | ICD-10-CM

## 2025-01-14 DIAGNOSIS — I10 PRIMARY HYPERTENSION: ICD-10-CM

## 2025-01-14 DIAGNOSIS — I48.0 PAF (PAROXYSMAL ATRIAL FIBRILLATION) (HCC): ICD-10-CM

## 2025-01-14 LAB — EKG IMPRESSION: NORMAL

## 2025-01-14 PROCEDURE — 99214 OFFICE O/P EST MOD 30 MIN: CPT | Performed by: INTERNAL MEDICINE

## 2025-01-14 PROCEDURE — 3075F SYST BP GE 130 - 139MM HG: CPT | Performed by: INTERNAL MEDICINE

## 2025-01-14 PROCEDURE — 99213 OFFICE O/P EST LOW 20 MIN: CPT | Performed by: INTERNAL MEDICINE

## 2025-01-14 PROCEDURE — 3079F DIAST BP 80-89 MM HG: CPT | Performed by: INTERNAL MEDICINE

## 2025-01-14 PROCEDURE — 93005 ELECTROCARDIOGRAM TRACING: CPT | Mod: TC | Performed by: INTERNAL MEDICINE

## 2025-01-14 PROCEDURE — 93010 ELECTROCARDIOGRAM REPORT: CPT | Performed by: INTERNAL MEDICINE

## 2025-01-14 ASSESSMENT — FIBROSIS 4 INDEX: FIB4 SCORE: 1.51

## 2025-01-14 NOTE — PROGRESS NOTES
Chief Complaint   Patient presents with    Atrial Fibrillation     F/v Dx:PAF (paroxysmal atrial fibrillation) (HCC)       Subjective     Tim Fraire is a 79 y.o. female who presents today with hypertension previous A-fib ablation no recurrence.  Hyperlipidemia on meds.  Mild fatigue.  Clinically stable.  Son with cancer.  Stress related to this.  Normal labs in August.  Normal echocardiogram in August.    Past Medical History:   Diagnosis Date    Arthritis     Polymyalgia Rheumatica; off prednisone since 2/7/22    Benign essential HTN 5/29/2012    DDD (degenerative disc disease), lumbar     Heart burn     High cholesterol     Hyperlipidemia 5/29/2012    PAF (paroxysmal atrial fibrillation) (HCC) 5/29/2012    Ablations x 2    Personal history of prior ablation treatment 5/29/2012    PONV (postoperative nausea and vomiting)      Past Surgical History:   Procedure Laterality Date    PB TOTAL KNEE ARTHROPLASTY Right 4/27/2022    Procedure: ARTHROPLASTY, KNEE, TOTAL;  Surgeon: Pedro Ibanez M.D.;  Location: SURGERY [facility];  Service: Orthopedics     History reviewed. No pertinent family history.  Social History     Socioeconomic History    Marital status:      Spouse name: Not on file    Number of children: Not on file    Years of education: Not on file    Highest education level: Not on file   Occupational History    Not on file   Tobacco Use    Smoking status: Never    Smokeless tobacco: Never   Vaping Use    Vaping status: Never Used   Substance and Sexual Activity    Alcohol use: Yes     Alcohol/week: 1.8 oz     Types: 3 Glasses of wine per week    Drug use: Never    Sexual activity: Not on file   Other Topics Concern    Not on file   Social History Narrative    Not on file     Social Drivers of Health     Financial Resource Strain: Not on file   Food Insecurity: Not on file   Transportation Needs: Not on file   Physical Activity: Not on file   Stress: Not on file   Social Connections: Not  on file   Intimate Partner Violence: Not on file   Housing Stability: Not on file     Allergies   Allergen Reactions    Crestor [Rosuvastatin Calcium]     Flagyl [Metronidazole] Nausea    Hydrochlorothiazide W-Triamterene      Other reaction(s): Other (See Comments)  hyponatremia    Rosuvastatin      Other reaction(s): .Unknown     Outpatient Encounter Medications as of 1/14/2025   Medication Sig Dispense Refill    ezetimibe (ZETIA) 10 MG Tab Take 1 Tablet by mouth every day. 100 Tablet 3    atorvastatin (LIPITOR) 20 MG Tab Take 1 Tablet by mouth every evening. 100 Tablet 3    amoxicillin (AMOXIL) 500 MG Cap TAKE FOUR CAPSULES BY MOUTH ONE HOUR BEFORE APPOINTMENT      olmesartan (BENICAR) 40 MG Tab Take 1 Tablet by mouth every day. 100 Tablet 3    spironolactone (ALDACTONE) 25 MG Tab TAKE 1/2 TABLET BY MOUTH ONCE DAILY FOR 90 DAYS 45 Tablet 0    verapamil SR (CALAN SR) 120 MG CR tablet Take 1 tablet by mouth once daily 90 Tablet 3    Ascorbic Acid (SUPER C COMPLEX PO) 0      fluticasone (FLONASE ALLERGY RELIEF) 50 MCG/ACT nasal spray 0      Turmeric (QC TUMERIC COMPLEX PO) Take 1,000 mg by mouth every day.      celecoxib (CELEBREX) 100 MG Cap Take 1 Capsule by mouth 2 times a day. 60 Capsule 2    diclofenac sodium ([employer] DICLOFENAC SODIUM) 1 % Gel Apply 4 g topically 4 times a day as needed (as needed for knee or back pain (or hip pain)). 350 g 3    acetaminophen (TYLENOL) 650 MG CR tablet Take 650 mg by mouth every 6 hours as needed.      Psyllium (METAMUCIL PO) Take  by mouth.      B Complex-Folic Acid (B COMPLEX PLUS PO) Take 1 Tablet by mouth every day.      loratadine (CLARITIN) 10 MG Tab Take 10 mg by mouth as needed.      vitamin D (CHOLECALCIFEROL) 1000 UNIT TABS Take 1,000 Units by mouth every day. D3      famotidine (PEPCID) 20 MG Tab Take 20 mg by mouth 1 time daily as needed.      [DISCONTINUED] aspirin 81 MG EC tablet Take 81 mg by mouth every day. Only as needed       No facility-administered encounter  "medications on file as of 1/14/2025.     ROS           Objective     /80 (BP Location: Left arm, Patient Position: Sitting, BP Cuff Size: Adult)   Pulse 78   Resp 16   Ht 1.702 m (5' 7\")   Wt 74.4 kg (164 lb)   SpO2 97%   BMI 25.69 kg/m²     Physical Exam  Constitutional:       Appearance: She is well-developed.   HENT:      Head: Normocephalic and atraumatic.   Eyes:      Pupils: Pupils are equal, round, and reactive to light.   Cardiovascular:      Rate and Rhythm: Normal rate and regular rhythm.      Heart sounds: Normal heart sounds. No murmur heard.     No friction rub. No gallop.   Pulmonary:      Effort: Pulmonary effort is normal.      Breath sounds: Normal breath sounds.   Abdominal:      General: Bowel sounds are normal.      Palpations: Abdomen is soft.   Musculoskeletal:         General: Normal range of motion.      Cervical back: Normal range of motion and neck supple.   Skin:     General: Skin is warm.   Neurological:      Mental Status: She is alert and oriented to person, place, and time.      Cranial Nerves: No cranial nerve deficit.   Psychiatric:         Behavior: Behavior normal.         Thought Content: Thought content normal.         Judgment: Judgment normal.                Assessment & Plan     1. PAF (paroxysmal atrial fibrillation) ([facility])  EKG      2. S/P ablation of atrial fibrillation        3. Primary hypertension        4. Mixed hyperlipidemia            Medical Decision Making: Today's Assessment/Status/Plan:   1.  Atrial fibrillation no recurrence/  2.  Hypertension continue current regimen/  3.  Hyperlipidemia on statins and Zetia.  4.  Follow-up in 12 months.                     "

## 2025-02-24 DIAGNOSIS — I48.0 PAF (PAROXYSMAL ATRIAL FIBRILLATION) (HCC): Chronic | ICD-10-CM

## 2025-02-24 RX ORDER — VERAPAMIL HYDROCHLORIDE 120 MG/1
120 TABLET, FILM COATED, EXTENDED RELEASE ORAL DAILY
Qty: 100 TABLET | Refills: 3 | Status: SHIPPED | OUTPATIENT
Start: 2025-02-24

## 2025-03-05 ENCOUNTER — HOSPITAL ENCOUNTER (OUTPATIENT)
Dept: LAB | Facility: MEDICAL CENTER | Age: 80
End: 2025-03-05
Attending: NURSE PRACTITIONER
Payer: MEDICARE

## 2025-03-05 DIAGNOSIS — E78.2 MIXED HYPERLIPIDEMIA: Chronic | ICD-10-CM

## 2025-03-05 DIAGNOSIS — I10 PRIMARY HYPERTENSION: ICD-10-CM

## 2025-03-05 LAB
ALBUMIN SERPL BCP-MCNC: 4.1 G/DL (ref 3.2–4.9)
ALBUMIN/GLOB SERPL: 1.7 G/DL
ALP SERPL-CCNC: 78 U/L (ref 30–99)
ALT SERPL-CCNC: 44 U/L (ref 2–50)
ANION GAP SERPL CALC-SCNC: 12 MMOL/L (ref 7–16)
AST SERPL-CCNC: 34 U/L (ref 12–45)
BILIRUB SERPL-MCNC: 0.5 MG/DL (ref 0.1–1.5)
BUN SERPL-MCNC: 23 MG/DL (ref 8–22)
CALCIUM ALBUM COR SERPL-MCNC: 9.5 MG/DL (ref 8.5–10.5)
CALCIUM SERPL-MCNC: 9.6 MG/DL (ref 8.5–10.5)
CHLORIDE SERPL-SCNC: 103 MMOL/L (ref 96–112)
CHOLEST SERPL-MCNC: 149 MG/DL (ref 100–199)
CO2 SERPL-SCNC: 24 MMOL/L (ref 20–33)
CREAT SERPL-MCNC: 1.31 MG/DL (ref 0.5–1.4)
CREAT UR-MCNC: 30.9 MG/DL
GFR SERPLBLD CREATININE-BSD FMLA CKD-EPI: 41 ML/MIN/1.73 M 2
GLOBULIN SER CALC-MCNC: 2.4 G/DL (ref 1.9–3.5)
GLUCOSE SERPL-MCNC: 91 MG/DL (ref 65–99)
HDLC SERPL-MCNC: 71 MG/DL
LDLC SERPL CALC-MCNC: 66 MG/DL
MICROALBUMIN UR-MCNC: <1.2 MG/DL
MICROALBUMIN/CREAT UR: NORMAL MG/G (ref 0–30)
POTASSIUM SERPL-SCNC: 4.4 MMOL/L (ref 3.6–5.5)
PROT SERPL-MCNC: 6.5 G/DL (ref 6–8.2)
SODIUM SERPL-SCNC: 139 MMOL/L (ref 135–145)
TRIGL SERPL-MCNC: 59 MG/DL (ref 0–149)

## 2025-03-05 PROCEDURE — 80061 LIPID PANEL: CPT

## 2025-03-05 PROCEDURE — 82043 UR ALBUMIN QUANTITATIVE: CPT

## 2025-03-05 PROCEDURE — 82570 ASSAY OF URINE CREATININE: CPT

## 2025-03-05 PROCEDURE — 36415 COLL VENOUS BLD VENIPUNCTURE: CPT

## 2025-03-05 PROCEDURE — 80053 COMPREHEN METABOLIC PANEL: CPT

## 2025-03-12 ENCOUNTER — OFFICE VISIT (OUTPATIENT)
Dept: VASCULAR LAB | Facility: MEDICAL CENTER | Age: 80
End: 2025-03-12
Attending: NURSE PRACTITIONER
Payer: MEDICARE

## 2025-03-12 VITALS
WEIGHT: 161 LBS | HEART RATE: 81 BPM | DIASTOLIC BLOOD PRESSURE: 82 MMHG | HEIGHT: 66 IN | SYSTOLIC BLOOD PRESSURE: 138 MMHG | BODY MASS INDEX: 25.88 KG/M2

## 2025-03-12 DIAGNOSIS — E78.00 PRIMARY HYPERCHOLESTEROLEMIA: ICD-10-CM

## 2025-03-12 DIAGNOSIS — E78.41 ELEVATED LIPOPROTEIN(A): ICD-10-CM

## 2025-03-12 DIAGNOSIS — I48.0 PAF (PAROXYSMAL ATRIAL FIBRILLATION) (HCC): Chronic | ICD-10-CM

## 2025-03-12 DIAGNOSIS — I10 PRIMARY HYPERTENSION: ICD-10-CM

## 2025-03-12 DIAGNOSIS — N18.31 STAGE 3A CHRONIC KIDNEY DISEASE: ICD-10-CM

## 2025-03-12 PROCEDURE — 99212 OFFICE O/P EST SF 10 MIN: CPT

## 2025-03-12 PROCEDURE — 3075F SYST BP GE 130 - 139MM HG: CPT | Performed by: NURSE PRACTITIONER

## 2025-03-12 PROCEDURE — 3079F DIAST BP 80-89 MM HG: CPT | Performed by: NURSE PRACTITIONER

## 2025-03-12 PROCEDURE — 99214 OFFICE O/P EST MOD 30 MIN: CPT | Performed by: NURSE PRACTITIONER

## 2025-03-12 RX ORDER — SPIRONOLACTONE 25 MG/1
12.5 TABLET ORAL DAILY
Qty: 45 TABLET | Refills: 3 | Status: SHIPPED | OUTPATIENT
Start: 2025-03-12

## 2025-03-12 ASSESSMENT — FIBROSIS 4 INDEX: FIB4 SCORE: 1.57

## 2025-03-12 NOTE — PROGRESS NOTES
FOLLOW UP VASCULAR MEDICINE VISIT    Chacha Fraire is a 80 yo female who presents 03/12/2025 for   Chief Complaint   Patient presents with    Follow-Up     initially referred by Angel Burgos M.D. for  eval and mgmt of HTN      Subjective      Interval hx/concerns:   Ongoing stress due to her son's esophageal cancer  Fatigue continues to be affecting her every day life-- taking benedryl to help with insomnia  Has a PCP in [place]   Never had a sleep study, denies snoring, headache, SOB    HTN:  Interval hx/concerns: no issues,   Not taking medications consistently   Home BP log:  variable readings, 118-150/60-80s.   Lifestyle factors:   Weight Change: -4lbs  BMI Readings from Last 5 Encounters:   03/12/25 25.99 kg/m²   02/03/25 25.50 kg/m²   01/14/25 25.69 kg/m²   09/05/24 25.06 kg/m²   08/07/24 27.66 kg/m²     Diet pattern changes since last visit: DASH   Daily salt intake estimate:  Low     EtOH: No  Exercise habits:  prior active, but limited d/t joint pain  Perceived barriers to care: joint pain, caregiving    Hyperlipidemia:  Stable, no changes.  On atorva 20mg, tolerating  Tolerating Zetia  No change in myalgias    Afib:   Stable, no changes   Seeing Dr. Burgos (EP), s/p ablation.  No OAC therapy.  No current sx.    Has [employer] mobile to use for afib eval at home     Antiplatelet/anticoagulation:    On asa, no bleeding    CKD  Denies poor appetite, SOB,      PMR:  Seeing rheum; continues to have diffuse pain    Current Outpatient Medications:     spironolactone, 12.5 mg, Oral, DAILY, Taking    verapamil SR, 120 mg, Oral, DAILY, Taking    ezetimibe, 10 mg, Oral, DAILY, Taking    atorvastatin, 20 mg, Oral, Nightly, Taking    amoxicillin, TAKE FOUR CAPSULES BY MOUTH ONE HOUR BEFORE APPOINTMENT, Taking    olmesartan, 40 mg, Oral, DAILY, Taking    Ascorbic Acid (SUPER C COMPLEX PO), 0, Taking    fluticasone, 0, Taking    Turmeric (QC TUMERIC COMPLEX PO), 1,000 mg, Oral, DAILY, Taking    celecoxib, 100 mg, Oral,  "BID, Taking    diclofenac sodium, 4 g, Topical, 4X/DAY PRN, Taking    acetaminophen, 650 mg, Oral, Q6HRS PRN, PRN    Psyllium (METAMUCIL PO), Take  by mouth., Taking    B Complex-Folic Acid (B COMPLEX PLUS PO), 1 Tablet, Oral, DAILY, Taking    loratadine, 10 mg, Oral, PRN, Taking    vitamin D, 1,000 Units, Oral, DAILY, Taking    famotidine, 20 mg, Oral, QDAY PRN, Taking     Allergies   Allergen Reactions    Crestor [Rosuvastatin Calcium]     Flagyl [Metronidazole] Nausea    Hydrochlorothiazide W-Triamterene      Other reaction(s): Other (See Comments)  hyponatremia    Rosuvastatin      Other reaction(s): .Unknown     Social History     Tobacco Use    Smoking status: Never    Smokeless tobacco: Never   Vaping Use    Vaping status: Never Used   Substance Use Topics    Alcohol use: Yes     Alcohol/week: 1.8 oz     Types: 3 Glasses of wine per week    Drug use: Never         Objective     Vitals:    03/12/25 1301 03/12/25 1305   BP: (!) 168/81 138/82   BP Location: Left arm Left arm   Patient Position: Sitting Sitting   BP Cuff Size: Adult Adult   Pulse: 76 81   Weight: 73 kg (161 lb)    Height: 1.676 m (5' 6\")      BP Readings from Last 5 Encounters:   03/12/25 138/82   02/03/25 (!) 159/77   01/14/25 134/80   09/05/24 (!) 142/73   08/07/24 (!) 156/88      Body mass index is 25.99 kg/m².  Physical Exam  Vitals reviewed.   Constitutional:       General: She is not in acute distress.     Appearance: Normal appearance.   HENT:      Head: Normocephalic and atraumatic.   Eyes:      General: No scleral icterus.  Pulmonary:      Effort: Pulmonary effort is normal. No respiratory distress.   Musculoskeletal:         General: No swelling. Normal range of motion.      Cervical back: Normal range of motion.      Right lower leg: No edema.      Left lower leg: No edema.   Skin:     General: Skin is warm and dry.      Coloration: Skin is not pale.   Neurological:      General: No focal deficit present.      Mental Status: She is " alert and oriented to person, place, and time.      Coordination: Coordination normal.      Gait: Gait normal.   Psychiatric:         Mood and Affect: Mood normal.         Behavior: Behavior normal.       Lab Results   Component Value Date    CHOLSTRLTOT 149 2025    LDL 66 2025    HDL 71 2025    TRIGLYCERIDE 59 2025      Lab Results   Component Value Date/Time    LIPOPROTA 120 (H) 2022 10:34 AM             Lab Results   Component Value Date    SODIUM 139 2025    POTASSIUM 4.4 2025    CHLORIDE 103 2025    CO2 24 2025    GLUCOSE 91 2025    BUN 23 (H) 2025    CREATININE 1.31 2025        Lab Results   Component Value Date    WBC 8.1 2024    RBC 4.37 2024    HEMOGLOBIN 12.9 2024    HEMATOCRIT 39.9 2024    MCV 91.3 2024    MCH 29.5 2024    MCHC 32.3 2024    MPV 10.7 2024         Lab Results   Component Value Date/Time    MALBCRT see below 2025 10:49 AM    MICROALBUR <1.2 2025 10:49 AM      VASCULAR IMAGING:   Last EKG:   Results for orders placed or performed in visit on 25   EKG   Result Value Ref Range    Report       [facility]    Test Date:  2025  Pt Name:    CLARITA BUTLER                Department: [facility]  MRN:        [id number]                      Room:  Gender:     Female                       Technician: KENNEDY  :        [id number]                   Requested By:ANGEL BURGOS  Order #:    [id number]                    Reading MD: Angel Burgos MD    Measurements  Intervals                                Axis  Rate:       70                           P:          67  AZ:         148                          QRS:        54  QRSD:       77                           T:          37  QT:         430  QTc:        460    Interpretive Statements  Sinus rhythm  Atrial premature complex  Minimal ST depression, lateral leads  Compared to ECG 2024 12:52:57  Atrial  premature complex(es) now present    Electronically Signed On 01- 15:41:09 PST by Angel Burgos MD       Echo 3/23/22   The left ventricular ejection fraction is visually estimated to be 65%.  No significant valvular Doppler abnormality.  Normal estimated right atrial pressure, unable to estimate RVSP.   No prior study is available for comparison.     LLE VR duplex 3/31/22   No LEFT lower extremity DVT or SVT.   No deep venous reflux demonstrated.    RLE venous 5/13/22   No deep or superficial venous abnormalities in the right leg.     Echo 8/2024  Compared to the prior study on 3/23/22. Unchanged.  Normal left ventricular chamber size.  Normal left ventricular systolic function.  Trace mitral regurgitation.  Trace tricuspid regurgitation.       Medical Decision Making:  Today's Assessment / Status / Plan:     1. Elevated lipoprotein(a)        2. PAF (paroxysmal atrial fibrillation) (HCC)        3. Primary hypercholesterolemia        4. Stage 3a chronic kidney disease  Basic Metabolic Panel      5. Primary hypertension  spironolactone (ALDACTONE) 25 MG Tab        Patient Type: Primary Prevention    Etiology of Established CVD if Present:     1) Afib, s/p ablation - stable   - ongoing care with cardiology for decision on OAC, rate/rhythm control and monitoring    BLOOD PRESSURE MANAGEMENT:  ACC/AHA (2017) goal <130/80, consider <140/80 reasonable due to age and prior med ADRs   Home BP at goal:  110-160s/60-80s, averaging out to 120s/70s ,mostly   Office BP at goal:  Yes   24h ABPM: 11/2023: 122/72 consistent with well-controlled out of office BP  Stopped doxazosin in the past for unclear reasons  Benazepril caused perceived fatigue  Stressed importance of close adherence to medication regimen- contact our office before making changes  Plan:   Monitoring:   - continue home BP monitoring- 3-4 times per week  - monitor lytes/gfr routinely   - contact office if BP consistently >140/>90 for discussion of tx  adjustments   - consider repeat ABPM   Medications:  - continue olmesartan 40mg in AM  DHP-CCB: continue to hold amlodipine d/t LE swelling  Thiazide: avoid due to hypoNa+  - continue spironolactone 12.5mg daily in AM  - continue verapamil 120mg every PM    LIPID MANAGEMENT:   Qualifies for Statin Therapy Based on 2018 ACC/AHA Guidelines: yes, Primary Severe HLD / FH (baseline LDL-C >190)  The 10-year ASCVD risk score (Amanda JACK, et al., 2019) is: 37.1%  High   - entire family with severe HLD, no premature ASCVD though, possible FH  Lp(a) = 120   Major ASCVD events: None  High-risk conditions: N/A  Risk-enhancers: Persistently elevated LDL-C >159 and Lp(a) >50  Currently on statin: yes, did not tolerate rosuvastatin or atorva at higher doses due to myalgias worsening  Treatment goals: reduce LDL-C >50%   At goal? Yes, much improved with addition of zetia    Previously taken off atorva d/t muscle pain, no changed noted upon stopping.    Has resumed atorva without any significant worsening.    Plan:   - reinforced ongoing TLC measures as noted   - cont fiber and Vit D  Meds:   - continue atorvastatin 20mg daily  - continue Zetia 10mg  - check lipid in 6 months     ANTITHROMBOTIC THERAPY:  continue asa 81mg daily but ok to take qod d/t consistent epistaxis with daily dosing    GLYCEMIC STATUS: Normal    LIFESTYLE INTERVENTIONS:    SMOKING:   reports that she has never smoked. She has never used smokeless tobacco.   - continued complete avoidance of all tobacco products     PHYSICAL ACTIVITY: encouraged increase more walking to improve CV fitness.  In general, targeting >150min/week of moderate-level activity or as much as tolerated.    WEIGHT MANAGEMENT AND NUTRITION: continue DASH diet      OTHER:    # PMR - unclear if flare, per labs,per pt  Defer to rheum    # CKD stage 3a  GFR declining   Discussed increasing hydration, follow BP medication regimen carefully   - Increase hydration  - Recheck GFR in 1 month     #  fatigue, ongoing.    As discussed with pt I do not feel this is being caused by her medications.  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  TSH was WNL.  Stop-Bang score 3-- at intermediate risk for NADINE- discussed again today, but declines referral- could consider home oximetry test-- she will discuss with her PCP.  Echo unchanged from previous.    - Defer further work up to PCP     Instructed to follow-up with PCP for remainder of adult medical needs: yes  We will partner with other providers in the management of established vascular disease and cardiometabolic risk factors.    Studies to Be Obtained: None   Labs to Be Obtained: lipid, CMP, microalbumin     Follow up in: 6 months     FATOU Broussard.  [facility]   [facility]   [phone]

## 2025-04-28 ENCOUNTER — HOSPITAL ENCOUNTER (OUTPATIENT)
Dept: LAB | Facility: MEDICAL CENTER | Age: 80
End: 2025-04-28
Attending: NURSE PRACTITIONER
Payer: MEDICARE

## 2025-04-28 DIAGNOSIS — N18.31 STAGE 3A CHRONIC KIDNEY DISEASE: ICD-10-CM

## 2025-04-28 LAB
ANION GAP SERPL CALC-SCNC: 9 MMOL/L (ref 7–16)
BUN SERPL-MCNC: 23 MG/DL (ref 8–22)
CALCIUM SERPL-MCNC: 9 MG/DL (ref 8.5–10.5)
CHLORIDE SERPL-SCNC: 103 MMOL/L (ref 96–112)
CO2 SERPL-SCNC: 26 MMOL/L (ref 20–33)
CREAT SERPL-MCNC: 1.22 MG/DL (ref 0.5–1.4)
GFR SERPLBLD CREATININE-BSD FMLA CKD-EPI: 45 ML/MIN/1.73 M 2
GLUCOSE SERPL-MCNC: 97 MG/DL (ref 65–99)
POTASSIUM SERPL-SCNC: 4.5 MMOL/L (ref 3.6–5.5)
SODIUM SERPL-SCNC: 138 MMOL/L (ref 135–145)

## 2025-04-28 PROCEDURE — 80048 BASIC METABOLIC PNL TOTAL CA: CPT

## 2025-04-28 PROCEDURE — 36415 COLL VENOUS BLD VENIPUNCTURE: CPT

## 2025-05-06 DIAGNOSIS — I10 PRIMARY HYPERTENSION: ICD-10-CM

## 2025-05-06 RX ORDER — OLMESARTAN MEDOXOMIL 40 MG/1
40 TABLET ORAL DAILY
Qty: 100 TABLET | Refills: 3 | Status: SHIPPED | OUTPATIENT
Start: 2025-05-06

## 2025-06-26 ENCOUNTER — HOSPITAL ENCOUNTER (OUTPATIENT)
Dept: RADIOLOGY | Facility: MEDICAL CENTER | Age: 80
End: 2025-06-26

## 2025-07-16 ENCOUNTER — HOSPITAL ENCOUNTER (OUTPATIENT)
Dept: RADIOLOGY | Facility: MEDICAL CENTER | Age: 80
End: 2025-07-16
Attending: NURSE PRACTITIONER
Payer: MEDICARE

## 2025-07-16 DIAGNOSIS — Z78.0 POSTMENOPAUSAL: ICD-10-CM

## 2025-07-16 DIAGNOSIS — Z13.820 SCREENING FOR OSTEOPOROSIS: ICD-10-CM

## 2025-07-16 DIAGNOSIS — M35.3 POLYMYALGIA RHEUMATICA (HCC): ICD-10-CM

## 2025-07-16 DIAGNOSIS — M85.80 OSTEOPENIA WITH HIGH RISK OF FRACTURE: ICD-10-CM

## 2025-07-16 PROCEDURE — 77080 DXA BONE DENSITY AXIAL: CPT

## 2025-07-24 ENCOUNTER — HOSPITAL ENCOUNTER (OUTPATIENT)
Dept: RADIOLOGY | Facility: MEDICAL CENTER | Age: 80
End: 2025-07-24
Payer: MEDICARE

## 2025-07-24 DIAGNOSIS — Z12.31 ENCOUNTER FOR MAMMOGRAM TO ESTABLISH BASELINE MAMMOGRAM: ICD-10-CM

## 2025-07-24 PROCEDURE — 77063 BREAST TOMOSYNTHESIS BI: CPT

## 2025-08-15 ENCOUNTER — HOSPITAL ENCOUNTER (OUTPATIENT)
Dept: LAB | Facility: MEDICAL CENTER | Age: 80
End: 2025-08-15
Attending: NURSE PRACTITIONER
Payer: MEDICARE

## 2025-08-15 DIAGNOSIS — R20.2 NUMBNESS AND TINGLING IN BOTH HANDS: ICD-10-CM

## 2025-08-15 DIAGNOSIS — M35.3 POLYMYALGIA RHEUMATICA (HCC): ICD-10-CM

## 2025-08-15 DIAGNOSIS — R20.0 NUMBNESS AND TINGLING IN BOTH HANDS: ICD-10-CM

## 2025-08-15 LAB
25(OH)D3 SERPL-MCNC: 36 NG/ML (ref 30–100)
ALBUMIN SERPL BCP-MCNC: 4.3 G/DL (ref 3.2–4.9)
ALBUMIN/GLOB SERPL: 2 G/DL
ALP SERPL-CCNC: 90 U/L (ref 30–99)
ALT SERPL-CCNC: 34 U/L (ref 2–50)
ANION GAP SERPL CALC-SCNC: 11 MMOL/L (ref 7–16)
AST SERPL-CCNC: 33 U/L (ref 12–45)
BASOPHILS # BLD AUTO: 1.6 % (ref 0–1.8)
BASOPHILS # BLD: 0.11 K/UL (ref 0–0.12)
BILIRUB SERPL-MCNC: 0.4 MG/DL (ref 0.1–1.5)
BUN SERPL-MCNC: 24 MG/DL (ref 8–22)
CALCIUM ALBUM COR SERPL-MCNC: 9.2 MG/DL (ref 8.5–10.5)
CALCIUM SERPL-MCNC: 9.4 MG/DL (ref 8.5–10.5)
CHLORIDE SERPL-SCNC: 106 MMOL/L (ref 96–112)
CO2 SERPL-SCNC: 22 MMOL/L (ref 20–33)
CREAT SERPL-MCNC: 1.28 MG/DL (ref 0.5–1.4)
CRP SERPL HS-MCNC: <0.3 MG/DL (ref 0–0.75)
EOSINOPHIL # BLD AUTO: 0.27 K/UL (ref 0–0.51)
EOSINOPHIL NFR BLD: 3.8 % (ref 0–6.9)
ERYTHROCYTE [DISTWIDTH] IN BLOOD BY AUTOMATED COUNT: 42.1 FL (ref 35.9–50)
ERYTHROCYTE [SEDIMENTATION RATE] IN BLOOD BY WESTERGREN METHOD: 6 MM/HOUR (ref 0–25)
GFR SERPLBLD CREATININE-BSD FMLA CKD-EPI: 42 ML/MIN/1.73 M 2
GLOBULIN SER CALC-MCNC: 2.2 G/DL (ref 1.9–3.5)
GLUCOSE SERPL-MCNC: 85 MG/DL (ref 65–99)
HCT VFR BLD AUTO: 41.7 % (ref 37–47)
HGB BLD-MCNC: 13.7 G/DL (ref 12–16)
IMM GRANULOCYTES # BLD AUTO: 0.02 K/UL (ref 0–0.11)
IMM GRANULOCYTES NFR BLD AUTO: 0.3 % (ref 0–0.9)
LYMPHOCYTES # BLD AUTO: 1.31 K/UL (ref 1–4.8)
LYMPHOCYTES NFR BLD: 18.5 % (ref 22–41)
MCH RBC QN AUTO: 28.5 PG (ref 27–33)
MCHC RBC AUTO-ENTMCNC: 32.9 G/DL (ref 32.2–35.5)
MCV RBC AUTO: 86.9 FL (ref 81.4–97.8)
MONOCYTES # BLD AUTO: 0.6 K/UL (ref 0–0.85)
MONOCYTES NFR BLD AUTO: 8.5 % (ref 0–13.4)
NEUTROPHILS # BLD AUTO: 4.78 K/UL (ref 1.82–7.42)
NEUTROPHILS NFR BLD: 67.3 % (ref 44–72)
NRBC # BLD AUTO: 0 K/UL
NRBC BLD-RTO: 0 /100 WBC (ref 0–0.2)
PLATELET # BLD AUTO: 299 K/UL (ref 164–446)
PMV BLD AUTO: 10.9 FL (ref 9–12.9)
POTASSIUM SERPL-SCNC: 4.7 MMOL/L (ref 3.6–5.5)
PROT SERPL-MCNC: 6.5 G/DL (ref 6–8.2)
RBC # BLD AUTO: 4.8 M/UL (ref 4.2–5.4)
SODIUM SERPL-SCNC: 139 MMOL/L (ref 135–145)
WBC # BLD AUTO: 7.1 K/UL (ref 4.8–10.8)

## 2025-08-15 PROCEDURE — 82306 VITAMIN D 25 HYDROXY: CPT | Mod: GA

## 2025-08-15 PROCEDURE — 36415 COLL VENOUS BLD VENIPUNCTURE: CPT

## 2025-08-15 PROCEDURE — 85652 RBC SED RATE AUTOMATED: CPT

## 2025-08-15 PROCEDURE — 85025 COMPLETE CBC W/AUTO DIFF WBC: CPT

## 2025-08-15 PROCEDURE — 80053 COMPREHEN METABOLIC PANEL: CPT

## 2025-08-15 PROCEDURE — 86140 C-REACTIVE PROTEIN: CPT

## (undated) DEVICE — SUCTION INSTRUMENT YANKAUER BULBOUS TIP W/O VENT (50EA/CA)

## (undated) DEVICE — HANDPIECE 10FT INTPLS SCT PLS IRRIGATION HAND CONTROL SET (6/PK)

## (undated) DEVICE — GLOVE BIOGEL SZ 8 SURGICAL PF LTX - (50PR/BX 4BX/CA)

## (undated) DEVICE — DRAPE LARGE 3 QUARTER - (20/CA)

## (undated) DEVICE — KIT ANESTHESIA W/CIRCUIT & 3/LT BAG W/FILTER (20EA/CA)

## (undated) DEVICE — ELECTRODE 850 FOAM ADHESIVE - HYDROGEL RADIOTRNSPRNT (50/PK)

## (undated) DEVICE — PIN TROCAR GEN 1/8X3 (4EA/BX)

## (undated) DEVICE — BLADE SAGITTAL SYSTEM 18MM

## (undated) DEVICE — CHLORAPREP 26 ML APPLICATOR - ORANGE TINT(25/CA)

## (undated) DEVICE — DERMABOND ADVANCED - (12EA/BX)

## (undated) DEVICE — TOWEL STOP TIMEOUT SAFETY FLAG (40EA/CA)

## (undated) DEVICE — BANDAGE ELASTIC STERILE VELCRO 6 X 5 YDS (25EA/CA)

## (undated) DEVICE — NEPTUNE 4 PORT MANIFOLD - (20/PK)

## (undated) DEVICE — GLOVE, LITE (PAIR)

## (undated) DEVICE — SUTURE 1 ETHIBOND CTX C/R 8-1 - (12/BX)

## (undated) DEVICE — Device

## (undated) DEVICE — PACK TOTAL KNEE  (1/CA)

## (undated) DEVICE — LACTATED RINGERS INJ 1000 ML - (14EA/CA 60CA/PF)

## (undated) DEVICE — SENSOR SPO2 NEO LNCS ADHESIVE (20/BX) SEE USER NOTES

## (undated) DEVICE — HEAD HOLDER JUNIOR/ADULT

## (undated) DEVICE — LENS/HOOD FOR SPACESUIT - (32/PK) PEEL AWAY FACE

## (undated) DEVICE — ELECTRODE DUAL RETURN W/ CORD - (50/PK)

## (undated) DEVICE — STOCKINETTE IMPERVIOUS 12X48 - STERILELF (10/CA)"

## (undated) DEVICE — TUBE CONNECTING SUCTION - CLEAR PLASTIC STERILE 72 IN (50EA/CA)

## (undated) DEVICE — SODIUM CHL IRRIGATION 0.9% 1000ML (12EA/CA)

## (undated) DEVICE — SUTURE 1 VICRYL PLUS CTX - 8 X 18 INCH (12/BX)

## (undated) DEVICE — CANISTER SUCTION RIGID RED 1500CC (40EA/CA)

## (undated) DEVICE — BLADE SAGITTAL 34MM

## (undated) DEVICE — TUBE E-T HI-LO CUFF 7.0MM (10EA/PK)

## (undated) DEVICE — SODIUM CHL. IRRIGATION 0.9% 3000ML (4EA/CA 65CA/PF)

## (undated) DEVICE — GOWN WARMING STANDARD FLEX - (30/CA)

## (undated) DEVICE — SUTURE 3-0 MONOCRYL PLUS PS-1 - 27 INCH (36/BX)

## (undated) DEVICE — TIP INTPLS HFLO ML ORFC BTRY - (12/CS)  FOR SURGILAV

## (undated) DEVICE — SUTURE GENERAL

## (undated) DEVICE — BAG SPONGE COUNT 10.25 X 32 - BLUE (250/CA)

## (undated) DEVICE — HUMID-VENT HEAT AND MOISTURE EXCHANGE- (50/BX)

## (undated) DEVICE — MASK ANESTHESIA ADULT  - (100/CA)

## (undated) DEVICE — MIXER BONE CEMENT REVOLUTION - W/FEMORAL PRESSURIZER (6/CA)

## (undated) DEVICE — PROTECTOR ULNA NERVE - (36PR/CA)

## (undated) DEVICE — DRESSING AQUACEL AG ADVANTAGE 3.5 X 10" (10EA/BX)"

## (undated) DEVICE — GLOVE BIOGEL PI ORTHO SZ 8 PF LF (40PR/BX)

## (undated) DEVICE — SUTURE 2-0 VICRYL PLUS CT-1 - 8 X 18 INCH(12/BX)

## (undated) DEVICE — WATER IRRIGATION STERILE 1000ML (12EA/CA)